# Patient Record
Sex: MALE | Race: WHITE | NOT HISPANIC OR LATINO | URBAN - METROPOLITAN AREA
[De-identification: names, ages, dates, MRNs, and addresses within clinical notes are randomized per-mention and may not be internally consistent; named-entity substitution may affect disease eponyms.]

---

## 2018-03-12 ENCOUNTER — INPATIENT (INPATIENT)
Facility: HOSPITAL | Age: 68
LOS: 7 days | Discharge: HOME | DRG: 86 | End: 2018-03-20
Attending: SURGERY | Admitting: SURGERY
Payer: MEDICARE

## 2018-03-12 VITALS — DIASTOLIC BLOOD PRESSURE: 90 MMHG | RESPIRATION RATE: 16 BRPM | SYSTOLIC BLOOD PRESSURE: 190 MMHG | HEART RATE: 90 BPM

## 2018-03-12 DIAGNOSIS — Z78.9 OTHER SPECIFIED HEALTH STATUS: Chronic | ICD-10-CM
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
## 2018-03-12 DIAGNOSIS — H92.21 OTORRHAGIA, RIGHT EAR: ICD-10-CM

## 2018-03-12 DIAGNOSIS — F10.10 ALCOHOL ABUSE, UNCOMPLICATED: ICD-10-CM

## 2018-03-12 LAB
APTT BLD: 30.2 SEC — SIGNIFICANT CHANGE UP (ref 27.5–37.4)
BASE EXCESS BLDV CALC-SCNC: 3.9 MMOL/L — HIGH (ref -2–2)
BASOPHILS # BLD AUTO: 0 K/UL — SIGNIFICANT CHANGE UP (ref 0–0.2)
BASOPHILS NFR BLD AUTO: 0.2 % — SIGNIFICANT CHANGE UP (ref 0–2)
CA-I SERPL-SCNC: 1.11 MMOL/L — LOW (ref 1.15–1.33)
CHLORIDE BLDV-SCNC: 102 MMOL/L — SIGNIFICANT CHANGE UP (ref 98–107)
EOSINOPHIL # BLD AUTO: 0.4 K/UL — SIGNIFICANT CHANGE UP (ref 0–0.5)
EOSINOPHIL NFR BLD AUTO: 5.2 % — SIGNIFICANT CHANGE UP (ref 0–6)
GAS PNL BLDV: 145 MMOL/L — SIGNIFICANT CHANGE UP (ref 135–145)
GAS PNL BLDV: SIGNIFICANT CHANGE UP
GAS PNL BLDV: SIGNIFICANT CHANGE UP
GLUCOSE BLDV-MCNC: 110 MG/DL — HIGH (ref 70–99)
HCO3 BLDV-SCNC: 28 MMOL/L — HIGH (ref 20–26)
HCT VFR BLD CALC: 42.6 % — SIGNIFICANT CHANGE UP (ref 42–52)
HCT VFR BLDA CALC: 46 — SIGNIFICANT CHANGE UP (ref 39–50)
HGB BLD CALC-MCNC: 14.9 G/DL — SIGNIFICANT CHANGE UP (ref 13–17)
HGB BLD-MCNC: 14.2 G/DL — SIGNIFICANT CHANGE UP (ref 14–18)
INR BLD: 0.93 RATIO — SIGNIFICANT CHANGE UP (ref 0.88–1.16)
LACTATE BLDV-MCNC: 2.9 MMOL/L — HIGH (ref 0.5–2)
LACTATE BLDV-MCNC: 3.1 MMOL/L — HIGH (ref 0.5–2)
LYMPHOCYTES # BLD AUTO: 3 K/UL — SIGNIFICANT CHANGE UP (ref 1–4.8)
LYMPHOCYTES # BLD AUTO: 36.7 % — SIGNIFICANT CHANGE UP (ref 20–55)
MCHC RBC-ENTMCNC: 29.8 PG — SIGNIFICANT CHANGE UP (ref 27–31)
MCHC RBC-ENTMCNC: 33.3 G/DL — SIGNIFICANT CHANGE UP (ref 32–36)
MCV RBC AUTO: 89.5 FL — SIGNIFICANT CHANGE UP (ref 80–94)
MONOCYTES # BLD AUTO: 1 K/UL — HIGH (ref 0–0.8)
MONOCYTES NFR BLD AUTO: 11.7 % — HIGH (ref 3–10)
NEUTROPHILS # BLD AUTO: 3.8 K/UL — SIGNIFICANT CHANGE UP (ref 1.8–8)
NEUTROPHILS NFR BLD AUTO: 45.8 % — SIGNIFICANT CHANGE UP (ref 37–73)
OTHER CELLS CSF MANUAL: 20 ML/DL — SIGNIFICANT CHANGE UP (ref 18–22)
PCO2 BLDV: 38 MMHG — SIGNIFICANT CHANGE UP (ref 35–50)
PH BLDV: 7.47 — HIGH (ref 7.32–7.43)
PLATELET # BLD AUTO: 201 K/UL — SIGNIFICANT CHANGE UP (ref 150–400)
PO2 BLDV: 88 MMHG — HIGH (ref 25–45)
POTASSIUM BLDV-SCNC: 3.3 MMOL/L — LOW (ref 3.4–4.5)
PROTHROM AB SERPL-ACNC: 10.2 SEC — SIGNIFICANT CHANGE UP (ref 9.8–12.7)
RBC # BLD: 4.76 M/UL — SIGNIFICANT CHANGE UP (ref 4.6–6.2)
RBC # FLD: 13.3 % — SIGNIFICANT CHANGE UP (ref 11–15.6)
SAO2 % BLDV: 97 % — SIGNIFICANT CHANGE UP
WBC # BLD: 8.2 K/UL — SIGNIFICANT CHANGE UP (ref 4.8–10.8)
WBC # FLD AUTO: 8.2 K/UL — SIGNIFICANT CHANGE UP (ref 4.8–10.8)

## 2018-03-12 PROCEDURE — 70450 CT HEAD/BRAIN W/O DYE: CPT | Mod: 26

## 2018-03-12 PROCEDURE — 72125 CT NECK SPINE W/O DYE: CPT | Mod: 26

## 2018-03-12 PROCEDURE — 99291 CRITICAL CARE FIRST HOUR: CPT

## 2018-03-12 PROCEDURE — 71045 X-RAY EXAM CHEST 1 VIEW: CPT | Mod: 26

## 2018-03-12 RX ORDER — ACETAMINOPHEN 500 MG
650 TABLET ORAL EVERY 6 HOURS
Qty: 0 | Refills: 0 | Status: DISCONTINUED | OUTPATIENT
Start: 2018-03-12 | End: 2018-03-14

## 2018-03-12 RX ORDER — LEVETIRACETAM 250 MG/1
1000 TABLET, FILM COATED ORAL EVERY 12 HOURS
Qty: 0 | Refills: 0 | Status: DISCONTINUED | OUTPATIENT
Start: 2018-03-12 | End: 2018-03-18

## 2018-03-12 RX ORDER — SODIUM CHLORIDE 9 MG/ML
1000 INJECTION INTRAMUSCULAR; INTRAVENOUS; SUBCUTANEOUS
Qty: 0 | Refills: 0 | Status: DISCONTINUED | OUTPATIENT
Start: 2018-03-12 | End: 2018-03-12

## 2018-03-12 RX ORDER — SODIUM CHLORIDE 9 MG/ML
1000 INJECTION INTRAMUSCULAR; INTRAVENOUS; SUBCUTANEOUS
Qty: 0 | Refills: 0 | Status: DISCONTINUED | OUTPATIENT
Start: 2018-03-12 | End: 2018-03-13

## 2018-03-12 RX ORDER — TETANUS TOXOID, REDUCED DIPHTHERIA TOXOID AND ACELLULAR PERTUSSIS VACCINE, ADSORBED 5; 2.5; 8; 8; 2.5 [IU]/.5ML; [IU]/.5ML; UG/.5ML; UG/.5ML; UG/.5ML
0.5 SUSPENSION INTRAMUSCULAR ONCE
Qty: 0 | Refills: 0 | Status: COMPLETED | OUTPATIENT
Start: 2018-03-12 | End: 2018-03-12

## 2018-03-12 NOTE — H&P ADULT - ASSESSMENT
66yo male s/p fall down steps while intoxicated. Patient for CT of head, cspine, chest abdomen and pelvis. CXR without significant traumatic injury. Tetanus updated in the trauma bay. C Collar in place. Disposition pending CT findings. SW for SBIRT. Clean out laceration to ear and repair as needed. Monitor for ETOH withdrawal. Follow up trauma labs/imaging. Dr. Landaverde present for entire encounter. 64yo male s/p fall down steps while intoxicated. Patient for CT of head, cspine, chest abdomen and pelvis. CXR without significant traumatic injury. Tetanus updated in the trauma bay. C Collar in place. Disposition pending CT findings. SW for SBIRT. Clean out laceration to ear and repair as needed. Monitor for ETOH withdrawal. Follow up trauma labs/imaging. Dr. Landaverde present for entire encounter.     Addendum: CT Head with Acute Left sided SDH/SAH. Dispo to SICU for further management. Neurosurgery consulted from CT scanner.

## 2018-03-12 NOTE — H&P ADULT - NSHPPHYSICALEXAM_GEN_ALL_CORE
Pertinent only for bleeding within left ear canal. Patient intoxicated and therefore unreliable physical exam as well as ROS.

## 2018-03-12 NOTE — H&P ADULT - HISTORY OF PRESENT ILLNESS
Approximately 66yo male s/p fall down 5 steps with LOC per spouse of "a few minutes". Patient consumed many alcoholic drinks this evening. Upon arrival to trauma bay a cervical collar was placed. GCS 14. Primary survey otherwise unremarkable. Patient speaking but confused. Only injury noted on secondary survey was blood to the right ear. Base Deficit +4. Lactate pending. No immediate life threats noted so patient taken to CT Scan for further evaluation. CXR performed in the bay prior to transport.

## 2018-03-12 NOTE — ED PROVIDER NOTE - OBJECTIVE STATEMENT
64 y/o M pt with no pertinent pmhx BIBA to the ED c/o change in MS and bleeding from R ear s/p fall down 6 steps today. CODE TRAUMA B ACTIVATED. EMS activated by pt's significant other. She explains that he had multiple drinks of scotch tonight. Pt's wife then heard a thump. She then found her  at the bottom of the stairs and was unable to arouse the pt. Takes 1 aspirin daily but no blood thinners. Unable to obtain full Hx and Ros due to pt's condition. NKDA. Denies vomiting, chest pain, bowel/bladder incontinence, fever, chills, sob or any other complaints.

## 2018-03-12 NOTE — H&P ADULT - ATTENDING COMMENTS
Patient seen and examined on arrival  ABCDE intact, GCS 13-14 non focal, BACON  CXR no pTX, CT left SDH/SAH, no midline shift  on my reviewed no intra abdominal or intrathoracic gross injury.\  Plan for ICU admission, neuro checks, repeat CT scan  neurosurgery top evaluate  Hydrate for lactate

## 2018-03-12 NOTE — ED ADULT TRIAGE NOTE - CHIEF COMPLAINT QUOTE
EMS requesting trauma, "pt fell down stairs, approximately 5 ft, pos LOC, bleeding from right ear, admits to alcohol"

## 2018-03-12 NOTE — ED PROVIDER NOTE - VASCULAR COMPROMISE
central and peripheral pulses 2+ bilaterally./pulses full and equal bilaterally/no vascular compromise

## 2018-03-12 NOTE — ED PROVIDER NOTE - CARE PLAN
Principal Discharge DX:	SAH (subarachnoid hemorrhage)  Secondary Diagnosis:	ETOH abuse  Secondary Diagnosis:	SDH (subdural hematoma)

## 2018-03-13 DIAGNOSIS — S06.5X0A TRAUMATIC SUBDURAL HEMORRHAGE WITHOUT LOSS OF CONSCIOUSNESS, INITIAL ENCOUNTER: ICD-10-CM

## 2018-03-13 DIAGNOSIS — I62.00 NONTRAUMATIC SUBDURAL HEMORRHAGE, UNSPECIFIED: ICD-10-CM

## 2018-03-13 DIAGNOSIS — I60.9 NONTRAUMATIC SUBARACHNOID HEMORRHAGE, UNSPECIFIED: ICD-10-CM

## 2018-03-13 LAB
ABO RH CONFIRMATION: SIGNIFICANT CHANGE UP
ALBUMIN SERPL ELPH-MCNC: 3.9 G/DL — SIGNIFICANT CHANGE UP (ref 3.3–5.2)
ALBUMIN SERPL ELPH-MCNC: 4.4 G/DL — SIGNIFICANT CHANGE UP (ref 3.3–5.2)
ALP SERPL-CCNC: 57 U/L — SIGNIFICANT CHANGE UP (ref 40–120)
ALP SERPL-CCNC: 64 U/L — SIGNIFICANT CHANGE UP (ref 40–120)
ALT FLD-CCNC: 26 U/L — SIGNIFICANT CHANGE UP
ALT FLD-CCNC: 27 U/L — SIGNIFICANT CHANGE UP
ANION GAP SERPL CALC-SCNC: 10 MMOL/L — SIGNIFICANT CHANGE UP (ref 5–17)
ANION GAP SERPL CALC-SCNC: 16 MMOL/L — SIGNIFICANT CHANGE UP (ref 5–17)
ANION GAP SERPL CALC-SCNC: 18 MMOL/L — HIGH (ref 5–17)
ANION GAP SERPL CALC-SCNC: 20 MMOL/L — HIGH (ref 5–17)
APTT BLD: 31.6 SEC — SIGNIFICANT CHANGE UP (ref 27.5–37.4)
AST SERPL-CCNC: 38 U/L — SIGNIFICANT CHANGE UP
AST SERPL-CCNC: 41 U/L — HIGH
BASOPHILS # BLD AUTO: 0 K/UL — SIGNIFICANT CHANGE UP (ref 0–0.2)
BASOPHILS # BLD AUTO: 0 K/UL — SIGNIFICANT CHANGE UP (ref 0–0.2)
BASOPHILS NFR BLD AUTO: 0.1 % — SIGNIFICANT CHANGE UP (ref 0–2)
BASOPHILS NFR BLD AUTO: 0.3 % — SIGNIFICANT CHANGE UP (ref 0–2)
BILIRUB SERPL-MCNC: 0.3 MG/DL — LOW (ref 0.4–2)
BILIRUB SERPL-MCNC: 0.3 MG/DL — LOW (ref 0.4–2)
BLD GP AB SCN SERPL QL: SIGNIFICANT CHANGE UP
BUN SERPL-MCNC: 15 MG/DL — SIGNIFICANT CHANGE UP (ref 8–20)
BUN SERPL-MCNC: 18 MG/DL — SIGNIFICANT CHANGE UP (ref 8–20)
BUN SERPL-MCNC: 22 MG/DL — HIGH (ref 8–20)
BUN SERPL-MCNC: 23 MG/DL — HIGH (ref 8–20)
CALCIUM SERPL-MCNC: 8.8 MG/DL — SIGNIFICANT CHANGE UP (ref 8.6–10.2)
CALCIUM SERPL-MCNC: 8.8 MG/DL — SIGNIFICANT CHANGE UP (ref 8.6–10.2)
CALCIUM SERPL-MCNC: 9.3 MG/DL — SIGNIFICANT CHANGE UP (ref 8.6–10.2)
CALCIUM SERPL-MCNC: 9.7 MG/DL — SIGNIFICANT CHANGE UP (ref 8.6–10.2)
CHLORIDE SERPL-SCNC: 100 MMOL/L — SIGNIFICANT CHANGE UP (ref 98–107)
CHLORIDE SERPL-SCNC: 96 MMOL/L — LOW (ref 98–107)
CHLORIDE SERPL-SCNC: 97 MMOL/L — LOW (ref 98–107)
CHLORIDE SERPL-SCNC: 99 MMOL/L — SIGNIFICANT CHANGE UP (ref 98–107)
CO2 SERPL-SCNC: 22 MMOL/L — SIGNIFICANT CHANGE UP (ref 22–29)
CO2 SERPL-SCNC: 25 MMOL/L — SIGNIFICANT CHANGE UP (ref 22–29)
CO2 SERPL-SCNC: 26 MMOL/L — SIGNIFICANT CHANGE UP (ref 22–29)
CO2 SERPL-SCNC: 28 MMOL/L — SIGNIFICANT CHANGE UP (ref 22–29)
CREAT SERPL-MCNC: 0.54 MG/DL — SIGNIFICANT CHANGE UP (ref 0.5–1.3)
CREAT SERPL-MCNC: 0.58 MG/DL — SIGNIFICANT CHANGE UP (ref 0.5–1.3)
CREAT SERPL-MCNC: 0.76 MG/DL — SIGNIFICANT CHANGE UP (ref 0.5–1.3)
CREAT SERPL-MCNC: 0.76 MG/DL — SIGNIFICANT CHANGE UP (ref 0.5–1.3)
EOSINOPHIL # BLD AUTO: 0 K/UL — SIGNIFICANT CHANGE UP (ref 0–0.5)
EOSINOPHIL # BLD AUTO: 0.4 K/UL — SIGNIFICANT CHANGE UP (ref 0–0.5)
EOSINOPHIL NFR BLD AUTO: 0.1 % — SIGNIFICANT CHANGE UP (ref 0–5)
EOSINOPHIL NFR BLD AUTO: 4.8 % — SIGNIFICANT CHANGE UP (ref 0–6)
ETHANOL SERPL-MCNC: 217 MG/DL — SIGNIFICANT CHANGE UP
ETHANOL SERPL-MCNC: 225 MG/DL — SIGNIFICANT CHANGE UP
GLUCOSE SERPL-MCNC: 104 MG/DL — SIGNIFICANT CHANGE UP (ref 70–115)
GLUCOSE SERPL-MCNC: 115 MG/DL — SIGNIFICANT CHANGE UP (ref 70–115)
GLUCOSE SERPL-MCNC: 139 MG/DL — HIGH (ref 70–115)
GLUCOSE SERPL-MCNC: 151 MG/DL — HIGH (ref 70–115)
HCT VFR BLD CALC: 38.8 % — LOW (ref 42–52)
HCT VFR BLD CALC: 41.3 % — LOW (ref 42–52)
HGB BLD-MCNC: 13 G/DL — LOW (ref 14–18)
HGB BLD-MCNC: 13.7 G/DL — LOW (ref 14–18)
INR BLD: 0.92 RATIO — SIGNIFICANT CHANGE UP (ref 0.88–1.16)
LACTATE SERPL-SCNC: 2.8 MMOL/L — HIGH (ref 0.5–2)
LYMPHOCYTES # BLD AUTO: 0.8 K/UL — LOW (ref 1–4.8)
LYMPHOCYTES # BLD AUTO: 3.3 K/UL — SIGNIFICANT CHANGE UP (ref 1–4.8)
LYMPHOCYTES # BLD AUTO: 34.9 % — SIGNIFICANT CHANGE UP (ref 20–55)
LYMPHOCYTES # BLD AUTO: 8 % — LOW (ref 20–55)
MAGNESIUM SERPL-MCNC: 1.9 MG/DL — SIGNIFICANT CHANGE UP (ref 1.6–2.6)
MCHC RBC-ENTMCNC: 29.8 PG — SIGNIFICANT CHANGE UP (ref 27–31)
MCHC RBC-ENTMCNC: 30 PG — SIGNIFICANT CHANGE UP (ref 27–31)
MCHC RBC-ENTMCNC: 33.2 G/DL — SIGNIFICANT CHANGE UP (ref 32–36)
MCHC RBC-ENTMCNC: 33.5 G/DL — SIGNIFICANT CHANGE UP (ref 32–36)
MCV RBC AUTO: 89 FL — SIGNIFICANT CHANGE UP (ref 80–94)
MCV RBC AUTO: 90.4 FL — SIGNIFICANT CHANGE UP (ref 80–94)
MONOCYTES # BLD AUTO: 0.5 K/UL — SIGNIFICANT CHANGE UP (ref 0–0.8)
MONOCYTES # BLD AUTO: 1.2 K/UL — HIGH (ref 0–0.8)
MONOCYTES NFR BLD AUTO: 12.8 % — HIGH (ref 3–10)
MONOCYTES NFR BLD AUTO: 4.9 % — SIGNIFICANT CHANGE UP (ref 3–10)
NEUTROPHILS # BLD AUTO: 4.4 K/UL — SIGNIFICANT CHANGE UP (ref 1.8–8)
NEUTROPHILS # BLD AUTO: 8.3 K/UL — HIGH (ref 1.8–8)
NEUTROPHILS NFR BLD AUTO: 47 % — SIGNIFICANT CHANGE UP (ref 37–73)
NEUTROPHILS NFR BLD AUTO: 86.6 % — HIGH (ref 37–73)
OSMOLALITY SERPL: 296 MOSM/KG — SIGNIFICANT CHANGE UP (ref 280–300)
OSMOLALITY SERPL: 318 MOSM/KG — HIGH (ref 280–300)
PHOSPHATE SERPL-MCNC: 3 MG/DL — SIGNIFICANT CHANGE UP (ref 2.4–4.7)
PLATELET # BLD AUTO: 162 K/UL — SIGNIFICANT CHANGE UP (ref 150–400)
PLATELET # BLD AUTO: 181 K/UL — SIGNIFICANT CHANGE UP (ref 150–400)
POTASSIUM SERPL-MCNC: 3.3 MMOL/L — LOW (ref 3.5–5.3)
POTASSIUM SERPL-MCNC: 3.4 MMOL/L — LOW (ref 3.5–5.3)
POTASSIUM SERPL-MCNC: 3.6 MMOL/L — SIGNIFICANT CHANGE UP (ref 3.5–5.3)
POTASSIUM SERPL-MCNC: 4 MMOL/L — SIGNIFICANT CHANGE UP (ref 3.5–5.3)
POTASSIUM SERPL-SCNC: 3.3 MMOL/L — LOW (ref 3.5–5.3)
POTASSIUM SERPL-SCNC: 3.4 MMOL/L — LOW (ref 3.5–5.3)
POTASSIUM SERPL-SCNC: 3.6 MMOL/L — SIGNIFICANT CHANGE UP (ref 3.5–5.3)
POTASSIUM SERPL-SCNC: 4 MMOL/L — SIGNIFICANT CHANGE UP (ref 3.5–5.3)
PROT SERPL-MCNC: 6.6 G/DL — SIGNIFICANT CHANGE UP (ref 6.6–8.7)
PROT SERPL-MCNC: 7.1 G/DL — SIGNIFICANT CHANGE UP (ref 6.6–8.7)
PROTHROM AB SERPL-ACNC: 10.1 SEC — SIGNIFICANT CHANGE UP (ref 9.8–12.7)
RBC # BLD: 4.36 M/UL — LOW (ref 4.6–6.2)
RBC # BLD: 4.57 M/UL — LOW (ref 4.6–6.2)
RBC # FLD: 13 % — SIGNIFICANT CHANGE UP (ref 11–15.6)
RBC # FLD: 13.3 % — SIGNIFICANT CHANGE UP (ref 11–15.6)
SODIUM SERPL-SCNC: 137 MMOL/L — SIGNIFICANT CHANGE UP (ref 135–145)
SODIUM SERPL-SCNC: 138 MMOL/L — SIGNIFICANT CHANGE UP (ref 135–145)
SODIUM SERPL-SCNC: 141 MMOL/L — SIGNIFICANT CHANGE UP (ref 135–145)
SODIUM SERPL-SCNC: 141 MMOL/L — SIGNIFICANT CHANGE UP (ref 135–145)
TYPE + AB SCN PNL BLD: SIGNIFICANT CHANGE UP
WBC # BLD: 9.4 K/UL — SIGNIFICANT CHANGE UP (ref 4.8–10.8)
WBC # BLD: 9.6 K/UL — SIGNIFICANT CHANGE UP (ref 4.8–10.8)
WBC # FLD AUTO: 9.4 K/UL — SIGNIFICANT CHANGE UP (ref 4.8–10.8)
WBC # FLD AUTO: 9.6 K/UL — SIGNIFICANT CHANGE UP (ref 4.8–10.8)

## 2018-03-13 PROCEDURE — 73030 X-RAY EXAM OF SHOULDER: CPT | Mod: 26,RT

## 2018-03-13 PROCEDURE — 70551 MRI BRAIN STEM W/O DYE: CPT | Mod: 26

## 2018-03-13 PROCEDURE — 99223 1ST HOSP IP/OBS HIGH 75: CPT

## 2018-03-13 PROCEDURE — 70544 MR ANGIOGRAPHY HEAD W/O DYE: CPT | Mod: 26,59

## 2018-03-13 PROCEDURE — 70486 CT MAXILLOFACIAL W/O DYE: CPT | Mod: 26

## 2018-03-13 PROCEDURE — 70450 CT HEAD/BRAIN W/O DYE: CPT | Mod: 26

## 2018-03-13 PROCEDURE — 93010 ELECTROCARDIOGRAM REPORT: CPT

## 2018-03-13 PROCEDURE — 74177 CT ABD & PELVIS W/CONTRAST: CPT | Mod: 26

## 2018-03-13 PROCEDURE — 70547 MR ANGIOGRAPHY NECK W/O DYE: CPT | Mod: 26

## 2018-03-13 PROCEDURE — 72141 MRI NECK SPINE W/O DYE: CPT | Mod: 26

## 2018-03-13 PROCEDURE — 71260 CT THORAX DX C+: CPT | Mod: 26

## 2018-03-13 PROCEDURE — 99291 CRITICAL CARE FIRST HOUR: CPT

## 2018-03-13 RX ORDER — FOLIC ACID 0.8 MG
1 TABLET ORAL DAILY
Qty: 0 | Refills: 0 | Status: DISCONTINUED | OUTPATIENT
Start: 2018-03-13 | End: 2018-03-17

## 2018-03-13 RX ORDER — ATORVASTATIN CALCIUM 80 MG/1
40 TABLET, FILM COATED ORAL AT BEDTIME
Qty: 0 | Refills: 0 | Status: DISCONTINUED | OUTPATIENT
Start: 2018-03-13 | End: 2018-03-20

## 2018-03-13 RX ORDER — SENNA PLUS 8.6 MG/1
2 TABLET ORAL AT BEDTIME
Qty: 0 | Refills: 0 | Status: DISCONTINUED | OUTPATIENT
Start: 2018-03-13 | End: 2018-03-20

## 2018-03-13 RX ORDER — GABAPENTIN 400 MG/1
300 CAPSULE ORAL THREE TIMES A DAY
Qty: 0 | Refills: 0 | Status: DISCONTINUED | OUTPATIENT
Start: 2018-03-13 | End: 2018-03-20

## 2018-03-13 RX ORDER — SODIUM CHLORIDE 5 G/100ML
500 INJECTION, SOLUTION INTRAVENOUS
Qty: 0 | Refills: 0 | Status: DISCONTINUED | OUTPATIENT
Start: 2018-03-13 | End: 2018-03-13

## 2018-03-13 RX ORDER — SODIUM CHLORIDE 9 MG/ML
1000 INJECTION INTRAMUSCULAR; INTRAVENOUS; SUBCUTANEOUS ONCE
Qty: 0 | Refills: 0 | Status: COMPLETED | OUTPATIENT
Start: 2018-03-13 | End: 2018-03-13

## 2018-03-13 RX ORDER — DOCUSATE SODIUM 100 MG
100 CAPSULE ORAL THREE TIMES A DAY
Qty: 0 | Refills: 0 | Status: DISCONTINUED | OUTPATIENT
Start: 2018-03-13 | End: 2018-03-20

## 2018-03-13 RX ORDER — DEXMEDETOMIDINE HYDROCHLORIDE IN 0.9% SODIUM CHLORIDE 4 UG/ML
0.2 INJECTION INTRAVENOUS
Qty: 200 | Refills: 0 | Status: DISCONTINUED | OUTPATIENT
Start: 2018-03-13 | End: 2018-03-14

## 2018-03-13 RX ORDER — SODIUM CHLORIDE 5 G/100ML
500 INJECTION, SOLUTION INTRAVENOUS
Qty: 0 | Refills: 0 | Status: DISCONTINUED | OUTPATIENT
Start: 2018-03-13 | End: 2018-03-14

## 2018-03-13 RX ORDER — HYDROCHLOROTHIAZIDE 25 MG
25 TABLET ORAL DAILY
Qty: 0 | Refills: 0 | Status: DISCONTINUED | OUTPATIENT
Start: 2018-03-13 | End: 2018-03-20

## 2018-03-13 RX ORDER — AMLODIPINE BESYLATE 2.5 MG/1
10 TABLET ORAL DAILY
Qty: 0 | Refills: 0 | Status: DISCONTINUED | OUTPATIENT
Start: 2018-03-13 | End: 2018-03-20

## 2018-03-13 RX ORDER — POTASSIUM CHLORIDE 20 MEQ
10 PACKET (EA) ORAL
Qty: 0 | Refills: 0 | Status: COMPLETED | OUTPATIENT
Start: 2018-03-13 | End: 2018-03-13

## 2018-03-13 RX ORDER — LEVOTHYROXINE SODIUM 125 MCG
200 TABLET ORAL DAILY
Qty: 0 | Refills: 0 | Status: DISCONTINUED | OUTPATIENT
Start: 2018-03-13 | End: 2018-03-20

## 2018-03-13 RX ORDER — THIAMINE MONONITRATE (VIT B1) 100 MG
100 TABLET ORAL DAILY
Qty: 0 | Refills: 0 | Status: DISCONTINUED | OUTPATIENT
Start: 2018-03-13 | End: 2018-03-17

## 2018-03-13 RX ADMIN — Medication 2 MILLIGRAM(S): at 16:30

## 2018-03-13 RX ADMIN — GABAPENTIN 300 MILLIGRAM(S): 400 CAPSULE ORAL at 14:48

## 2018-03-13 RX ADMIN — Medication 1 TABLET(S): at 14:48

## 2018-03-13 RX ADMIN — Medication 100 MILLIEQUIVALENT(S): at 03:18

## 2018-03-13 RX ADMIN — Medication 100 MILLIEQUIVALENT(S): at 01:57

## 2018-03-13 RX ADMIN — Medication 100 MILLIEQUIVALENT(S): at 04:30

## 2018-03-13 RX ADMIN — SODIUM CHLORIDE 40 MILLILITER(S): 5 INJECTION, SOLUTION INTRAVENOUS at 09:41

## 2018-03-13 RX ADMIN — LEVETIRACETAM 400 MILLIGRAM(S): 250 TABLET, FILM COATED ORAL at 06:34

## 2018-03-13 RX ADMIN — Medication 100 MILLIGRAM(S): at 14:48

## 2018-03-13 RX ADMIN — Medication 25 MILLIGRAM(S): at 06:31

## 2018-03-13 RX ADMIN — LEVETIRACETAM 400 MILLIGRAM(S): 250 TABLET, FILM COATED ORAL at 18:45

## 2018-03-13 RX ADMIN — Medication 100 MILLIGRAM(S): at 06:33

## 2018-03-13 RX ADMIN — DEXMEDETOMIDINE HYDROCHLORIDE IN 0.9% SODIUM CHLORIDE 5.33 MICROGRAM(S)/KG/HR: 4 INJECTION INTRAVENOUS at 06:31

## 2018-03-13 RX ADMIN — Medication 1 MILLIGRAM(S): at 14:48

## 2018-03-13 RX ADMIN — Medication 200 MICROGRAM(S): at 06:32

## 2018-03-13 RX ADMIN — DEXMEDETOMIDINE HYDROCHLORIDE IN 0.9% SODIUM CHLORIDE 5.33 MICROGRAM(S)/KG/HR: 4 INJECTION INTRAVENOUS at 03:18

## 2018-03-13 RX ADMIN — Medication 2 MILLIGRAM(S): at 17:30

## 2018-03-13 RX ADMIN — DEXMEDETOMIDINE HYDROCHLORIDE IN 0.9% SODIUM CHLORIDE 5.33 MICROGRAM(S)/KG/HR: 4 INJECTION INTRAVENOUS at 01:25

## 2018-03-13 RX ADMIN — GABAPENTIN 300 MILLIGRAM(S): 400 CAPSULE ORAL at 06:34

## 2018-03-13 RX ADMIN — AMLODIPINE BESYLATE 10 MILLIGRAM(S): 2.5 TABLET ORAL at 06:33

## 2018-03-13 RX ADMIN — SODIUM CHLORIDE 20 MILLILITER(S): 5 INJECTION, SOLUTION INTRAVENOUS at 08:00

## 2018-03-13 RX ADMIN — SODIUM CHLORIDE 100 MILLILITER(S): 9 INJECTION INTRAMUSCULAR; INTRAVENOUS; SUBCUTANEOUS at 01:00

## 2018-03-13 RX ADMIN — SODIUM CHLORIDE 2000 MILLILITER(S): 9 INJECTION INTRAMUSCULAR; INTRAVENOUS; SUBCUTANEOUS at 01:00

## 2018-03-13 NOTE — SWALLOW BEDSIDE ASSESSMENT ADULT - SLP GENERAL OBSERVATIONS
Pt received & seen seated upright in bed, +eyes closed however arousable with cues, +cervical collar,  +reduced cognition with ? word finding deficits, +reduced command following, +O2 nasal canula (sats: 95%), +sister in law at bedside

## 2018-03-13 NOTE — PROGRESS NOTE ADULT - ASSESSMENT
67ym s/p fall right frontal contusion right temporal sah  Plan  1. repeat ct of the brain appreciated.  2. Pt will need a mri of the brain right sided weakness.  3. pt will need a mri of the cervical region  4. Right sided upper extrem weakness with extensive hematoma ? brachial plexus injury if cervical mri benign.   5. Pt right lower extrem weakness-hip?  6. Na 141 recommendation to start saline due to the increase vasogenic edema.  7. the above discussed with Dr Parsons earlier this am by the overnight staff and discussed with the trauma PA staff

## 2018-03-13 NOTE — PROGRESS NOTE ADULT - SUBJECTIVE AND OBJECTIVE BOX
INTERVAL HPI/OVERNIGHT EVENTS/SUBJECTIVE:  Admitted for SAH/sdh, clavicle fracture, 1st rib fracture and facial fractures after fall down stairs while intoxicated.  Right sided deficit - not moving right arm and right leg.      ICU Vital Signs Last 24 Hrs  T(C): 36.8 (13 Mar 2018 08:00), Max: 36.8 (13 Mar 2018 08:00)  T(F): 98.2 (13 Mar 2018 08:00), Max: 98.2 (13 Mar 2018 08:00)  HR: 81 (13 Mar 2018 08:00) (65 - 102)  BP: 112/59 (13 Mar 2018 08:00) (106/59 - 190/90)  BP(mean): 88 (13 Mar 2018 07:00) (76 - 120)  ABP: --  ABP(mean): --  RR: 20 (13 Mar 2018 08:00) (14 - 25)  SpO2: 95% (13 Mar 2018 08:00) (92% - 99%)      I&O's Detail    12 Mar 2018 07:01  -  13 Mar 2018 07:00  --------------------------------------------------------  IN:    dexmedetomidine Infusion: 51.3 mL    IV PiggyBack: 400 mL    Sodium Chloride 0.9% IV Bolus: 1000 mL    sodium chloride 0.9%.: 600 mL  Total IN: 2051.3 mL    OUT:    Voided: 300 mL  Total OUT: 300 mL    Total NET: 1751.3 mL                MEDICATIONS  (STANDING):  amLODIPine   Tablet 10 milliGRAM(s) Oral daily  atorvastatin 40 milliGRAM(s) Oral at bedtime  dexmedetomidine Infusion 0.2 MICROgram(s)/kG/Hr (5.335 mL/Hr) IV Continuous <Continuous>  docusate sodium 100 milliGRAM(s) Oral three times a day  folic acid Injectable 1 milliGRAM(s) IV Push daily  gabapentin 300 milliGRAM(s) Oral three times a day  hydrochlorothiazide 25 milliGRAM(s) Oral daily  levETIRAcetam  IVPB 1000 milliGRAM(s) IV Intermittent every 12 hours  levothyroxine 200 MICROGram(s) Oral daily  multivitamin 1 Tablet(s) Oral daily  senna 2 Tablet(s) Oral at bedtime  sodium chloride 0.9%. 1000 milliLiter(s) (100 mL/Hr) IV Continuous <Continuous>  sodium chloride 3%. 500 milliLiter(s) (20 mL/Hr) IV Continuous <Continuous>  thiamine Injectable 100 milliGRAM(s) IV Push daily    MEDICATIONS  (PRN):  acetaminophen   Tablet. 650 milliGRAM(s) Oral every 6 hours PRN Mild Pain (1 - 3)      NUTRITION/IVF:     CENTRAL LINE:  LOCATION:   DATE INSERTED:  CVP:  SCVO2:    NICKERSON:   DATE INSERTED:    A-LINE:    LOCATION:   DATE INSERTED:   SVV:  CO/CI:     CHEST TUBE:  LOCATION:  DATE INSERTED: OUTPUT/24 HRS:  SUCTION/WATER SEAL:     NG/OG TUBE:  DATE INSERTED:  OUTPUT/24 HRS:    MISC:     PHYSICAL EXAM:    Gen:    Eyes:    Neurological:    ENMT:    Neck:    Pulmonary:    Cardiovascular:    Gastrointestinal:    Genitourinary:    Back:    Extremities:    Skin:    Musculoskeletal:          LABS:  CBC Full  -  ( 13 Mar 2018 07:05 )  WBC Count : 9.6 K/uL  Hemoglobin : 13.0 g/dL  Hematocrit : 38.8 %  Platelet Count - Automated : 162 K/uL  Mean Cell Volume : 89.0 fl  Mean Cell Hemoglobin : 29.8 pg  Mean Cell Hemoglobin Concentration : 33.5 g/dL  Auto Neutrophil # : 8.3 K/uL  Auto Lymphocyte # : 0.8 K/uL  Auto Monocyte # : 0.5 K/uL  Auto Eosinophil # : 0.0 K/uL  Auto Basophil # : 0.0 K/uL  Auto Neutrophil % : 86.6 %  Auto Lymphocyte % : 8.0 %  Auto Monocyte % : 4.9 %  Auto Eosinophil % : 0.1 %  Auto Basophil % : 0.1 %    03-13    141  |  100  |  18.0  ----------------------------<  139<H>  4.0   |  25.0  |  0.58    Ca    8.8      13 Mar 2018 07:05  Phos  3.0     03-13  Mg     1.9     03-13    TPro  6.6  /  Alb  3.9  /  TBili  0.3<L>  /  DBili  x   /  AST  41<H>  /  ALT  26  /  AlkPhos  57  03-13    PT/INR - ( 13 Mar 2018 00:30 )   PT: 10.1 sec;   INR: 0.92 ratio         PTT - ( 13 Mar 2018 00:30 )  PTT:31.6 sec    RECENT CULTURES:      LIVER FUNCTIONS - ( 13 Mar 2018 00:30 )  Alb: 3.9 g/dL / Pro: 6.6 g/dL / ALK PHOS: 57 U/L / ALT: 26 U/L / AST: 41 U/L / GGT: x               CAPILLARY BLOOD GLUCOSE      RADIOLOGY & ADDITIONAL STUDIES:    ASSESSMENT/PLAN:  65yMale presenting with:    Neuro:    CV:    Pulm:    GI/Nutrition:    /Renal:    ID:    Lines/Tubes:    Endo:    Skin:    Proph:    Dispo:      CRITICAL CARE TIME SPENT: INTERVAL HPI/OVERNIGHT EVENTS/SUBJECTIVE:  Admitted for SAH/sdh, clavicle fracture, 1st rib fracture and facial fractures after fall down stairs while intoxicated.  Right sided deficit - not moving right arm and right leg.      ICU Vital Signs Last 24 Hrs  T(C): 36.8 (13 Mar 2018 08:00), Max: 36.8 (13 Mar 2018 08:00)  T(F): 98.2 (13 Mar 2018 08:00), Max: 98.2 (13 Mar 2018 08:00)  HR: 81 (13 Mar 2018 08:00) (65 - 102)  BP: 112/59 (13 Mar 2018 08:00) (106/59 - 190/90)  BP(mean): 88 (13 Mar 2018 07:00) (76 - 120)  ABP: --  ABP(mean): --  RR: 20 (13 Mar 2018 08:00) (14 - 25)  SpO2: 95% (13 Mar 2018 08:00) (92% - 99%)      I&O's Detail    12 Mar 2018 07:01  -  13 Mar 2018 07:00  --------------------------------------------------------  IN:    dexmedetomidine Infusion: 51.3 mL    IV PiggyBack: 400 mL    Sodium Chloride 0.9% IV Bolus: 1000 mL    sodium chloride 0.9%.: 600 mL  Total IN: 2051.3 mL    OUT:    Voided: 300 mL  Total OUT: 300 mL    Total NET: 1751.3 mL                MEDICATIONS  (STANDING):  amLODIPine   Tablet 10 milliGRAM(s) Oral daily  atorvastatin 40 milliGRAM(s) Oral at bedtime  dexmedetomidine Infusion 0.2 MICROgram(s)/kG/Hr (5.335 mL/Hr) IV Continuous <Continuous>  docusate sodium 100 milliGRAM(s) Oral three times a day  folic acid Injectable 1 milliGRAM(s) IV Push daily  gabapentin 300 milliGRAM(s) Oral three times a day  hydrochlorothiazide 25 milliGRAM(s) Oral daily  levETIRAcetam  IVPB 1000 milliGRAM(s) IV Intermittent every 12 hours  levothyroxine 200 MICROGram(s) Oral daily  multivitamin 1 Tablet(s) Oral daily  senna 2 Tablet(s) Oral at bedtime  sodium chloride 0.9%. 1000 milliLiter(s) (100 mL/Hr) IV Continuous <Continuous>  sodium chloride 3%. 500 milliLiter(s) (20 mL/Hr) IV Continuous <Continuous>  thiamine Injectable 100 milliGRAM(s) IV Push daily    MEDICATIONS  (PRN):  acetaminophen   Tablet. 650 milliGRAM(s) Oral every 6 hours PRN Mild Pain (1 - 3)      NUTRITION/IVF:     CENTRAL LINE:  LOCATION:   DATE INSERTED:  CVP:  SCVO2:    NICKERSON:   DATE INSERTED:    A-LINE:    LOCATION:   DATE INSERTED:   SVV:  CO/CI:     CHEST TUBE:  LOCATION:  DATE INSERTED: OUTPUT/24 HRS:  SUCTION/WATER SEAL:     NG/OG TUBE:  DATE INSERTED:  OUTPUT/24 HRS:    MISC:     PHYSICAL EXAM:    Gen:  NAD    Eyes:  PERRLA    Neurological:  Confused.  GCS 14    ENMT:    Neck:  No JVD    Pulmonary:  CTA B    Cardiovascular:  S1S2 regular    Gastrointestinal:  S/NT/ND    Genitourinary:  Voids    Back:    Extremities:  No edema    Skin:  Warm    Musculoskeletal:  Moves left leg and left arm.  does not move right leg or arm.            LABS:  CBC Full  -  ( 13 Mar 2018 07:05 )  WBC Count : 9.6 K/uL  Hemoglobin : 13.0 g/dL  Hematocrit : 38.8 %  Platelet Count - Automated : 162 K/uL  Mean Cell Volume : 89.0 fl  Mean Cell Hemoglobin : 29.8 pg  Mean Cell Hemoglobin Concentration : 33.5 g/dL  Auto Neutrophil # : 8.3 K/uL  Auto Lymphocyte # : 0.8 K/uL  Auto Monocyte # : 0.5 K/uL  Auto Eosinophil # : 0.0 K/uL  Auto Basophil # : 0.0 K/uL  Auto Neutrophil % : 86.6 %  Auto Lymphocyte % : 8.0 %  Auto Monocyte % : 4.9 %  Auto Eosinophil % : 0.1 %  Auto Basophil % : 0.1 %    03-13    141  |  100  |  18.0  ----------------------------<  139<H>  4.0   |  25.0  |  0.58    Ca    8.8      13 Mar 2018 07:05  Phos  3.0     03-13  Mg     1.9     03-13    TPro  6.6  /  Alb  3.9  /  TBili  0.3<L>  /  DBili  x   /  AST  41<H>  /  ALT  26  /  AlkPhos  57  03-13    PT/INR - ( 13 Mar 2018 00:30 )   PT: 10.1 sec;   INR: 0.92 ratio         PTT - ( 13 Mar 2018 00:30 )  PTT:31.6 sec    RECENT CULTURES:      LIVER FUNCTIONS - ( 13 Mar 2018 00:30 )  Alb: 3.9 g/dL / Pro: 6.6 g/dL / ALK PHOS: 57 U/L / ALT: 26 U/L / AST: 41 U/L / GGT: x               CAPILLARY BLOOD GLUCOSE    RADIOLOGY & ADDITIONAL STUDIES:    ASSESSMENT/PLAN:  65yMale presenting with:    Neuro:  Keppra.  Will use 1.5% hypertonic saline and check Na levels.   Will do MRI/MRA head and neck as has unaccountable right sided deficit - ?stroke vs intoxicated and vascular injury.  F/u NS    CV:  Resume home anti-hypertensives    Pulm:  supplemental O2 prn    GI/Nutrition:  NPO until w/u complete.  Then speech/swallow eval    /Renal:  Voids    ID:  No abx    Lines/Tubes:  PIV    Endo:  Synthroid    Musculoskeletal:  Pain control for 1st rib fracture.  sling for clavicle fracture    Skin: No issues    Proph:  SCDs    Dispo:  SICU for TBI, CIWA, fractures    CRITICAL CARE TIME SPENT:  35 minutes

## 2018-03-13 NOTE — SWALLOW BEDSIDE ASSESSMENT ADULT - SWALLOW EVAL: RECOMMENDED FEEDING/EATING TECHNIQUES
allow for swallow between intakes/small sips/bites/position upright (90 degrees)/crush medication (when feasible)/maintain upright posture during/after eating for 30 mins

## 2018-03-13 NOTE — SWALLOW BEDSIDE ASSESSMENT ADULT - COMMENTS
67 year old male, admitted for SAH/sdh, clavicle fracture, 1st rib fracture and facial fractures after fall down stairs while intoxicated. Pt declined additional trials, however, pt not appropriate for additional trials this date due to reduced cognition

## 2018-03-13 NOTE — SWALLOW BEDSIDE ASSESSMENT ADULT - SWALLOW EVAL: DIAGNOSIS
Overall functional oral & pharyngeal stage of swallow for puree, mech soft & thin fluids with NO overt s/s of penetration/aspiration

## 2018-03-13 NOTE — CONSULT NOTE ADULT - SUBJECTIVE AND OBJECTIVE BOX
CC: Patient is a 65y old  Male who presents  S/P  Fall while intoxicated with LOC (12 Mar 2018 23:36)      HPI: Approximately 66yo male s/p fall down 5 steps with LOC per spouse of "a few minutes". Patient consumed many alcoholic drinks this evening. Upon arrival to trauma bay a cervical collar was placed. GCS 14. Primary survey otherwise unremarkable. Patient speaking but confused. Only injury noted on secondary survey was blood to the right ear.     pt c/o + -headache  /10 on the pain scale   + - Nausea /+ - Vomiting  admits denies weakness  admits denies numbness/ tingling  admist denies visual changes  admist denies C/T/LS  Spine pain    PAST MEDICAL:   Medical history unknown    SURGICAL HISTORY:      SOCIAL HISTORY: + - EtOH, + - tobacco, + - drugs    FAMILY HISTORY:  No pertinent family history in first degree relatives      ROS:  CONSTITUTIONAL: No fever, weight loss, or fatigue  EYES: No eye pain, visual disturbances, or discharge  ENMT:  No difficulty hearing, tinnitus, vertigo; No sinus or throat pain  NECK: No pain or stiffness  BREASTS: No pain, masses, or nipple discharge  RESPIRATORY: No cough, wheezing, chills or hemoptysis; No shortness of breath  CARDIOVASCULAR: No chest pain, palpitations, dizziness, or leg swelling  GASTROINTESTINAL: No abdominal or epigastric pain. No nausea, vomiting, or hematemesis; No diarrhea or constipation. No melena or hematochezia.  GENITOURINARY: No dysuria, frequency, hematuria, or incontinence  NEUROLOGICAL: No headaches, memory loss, loss of strength, numbness, or tremors  SKIN: No itching, burning, rashes, or lesions   LYMPH NODES: No enlarged glands  ENDOCRINE: No heat or cold intolerance; No hair loss  MUSCULOSKELETAL: No joint pain or swelling; No muscle, back, or extremity pain  PSYCHIATRIC: No depression, anxiety, mood swings, or difficulty sleeping  HEME/LYMPH: No easy bruising, or bleeding gums  ALLERY AND IMMUNOLOGIC: No hives or eczema      MEDICATIONS:  diphtheria/tetanus/pertussis (acellular) Vaccine (ADAcel) 0.5 milliLiter(s) IntraMuscular once  levETIRAcetam  IVPB 1000 milliGRAM(s) IV Intermittent every 12 hours  sodium chloride 0.9%. 1000 milliLiter(s) (100 mL/Hr) IV Continuous <Continuous>    MEDICATIONS  (PRN):  acetaminophen   Tablet. 650 milliGRAM(s) Oral every 6 hours PRN Mild Pain (1 - 3)      Allergies: Allergy Status Unknown      Vital Signs Last 24 Hrs  T(C): --  T(F): --  HR: 90 (12 Mar 2018 23:32) (90 - 90)  BP: 190/90 (12 Mar 2018 23:32) (190/90 - 190/90)  BP(mean): --  RR: 16 (12 Mar 2018 23:32) (16 - 16)  SpO2: --    PHYSICAL EXAM:  GENERAL: NAD, well-groomed, well-developed  HEAD:  Atraumatic, Normocephalic  EYES: EOMI, PERRLA, conjunctiva and sclera clear  ENMT: No tonsillar erythema, exudates, or enlargement; Moist mucous membranes, Good dentition, No lesions  NECK: Supple, No JVD, Normal thyroid  NERVOUS SYSTEM:  Alert & Oriented X3, Good concentration; Motor Strength 5/5 B/L upper and lower extremities; DTRs 2+ intact and symmetric  CHEST/LUNG: Clear bilaterally; No rales, rhonchi, wheezing, or rubs  HEART: Regular rate and rhythm; No murmurs, rubs, or gallops  ABDOMEN: Soft, Nontender, Nondistended; Bowel sounds present  EXTREMITIES:  2+ Peripheral Pulses, No clubbing, cyanosis, or edema  LYMPH: No lymphadenopathy noted  SKIN: No rashes or lesions      RADIOLOGY & ADDITIONAL STUDIES:                          14.2   8.2   )-----------( 201      ( 12 Mar 2018 23:39 )             42.6           PT/INR - ( 12 Mar 2018 23:39 )   PT: 10.2 sec;   INR: 0.93 ratio         PTT - ( 12 Mar 2018 23:39 )  PTT:30.2 sec      H CC: Patient is a 65y old  Male who presents  S/P  Fall while intoxicated with LOC (12 Mar 2018 23:36)  SOURCE: PATIENT AND PATIENTS WIFE    HPI: Approximately 64yo male s/p fall down 5 steps with LOC per spouse of "a few minutes". Patient consumed many alcoholic drinks this evening. Upon arrival to trauma bay a cervical collar was placed. GCS 14. Primary survey otherwise unremarkable. Patient speaking but confused.   Blood to the right ear. Denies and head ache, no dizziness, no motor or sensory changes, no nausea or vomiting, no incontinence.         PAST MEDICAL:   htn, hypothyroid, hypercholesterolemia, ETOH abuse    SURGICAL HISTORY:  none known    SOCIAL HISTORY: + -EtOH 3 or more scotch/day,   +  tobacco 1/3-1ppd for 20 yrs, quit 8 yrs ago,   - drugs    FAMILY HISTORY:  FATHER: DEMENTIA      ROS: PER WIFE   CONSTITUTIONAL: No fever, weight loss, or fatigue  EYES: No eye pain, visual disturbances, or discharge  ENMT:  No difficulty hearing, tinnitus, vertigo; No sinus or throat pain  RESPIRATORY: No cough, wheezing, chills or hemoptysis; No shortness of breath  CARDIOVASCULAR: No chest pain, palpitations, dizziness, or leg swelling  GASTROINTESTINAL: No abdominal or epigastric pain. No nausea, vomiting, or hematemesis; No diarrhea or constipation. No melena or hematochezia.  GENITOURINARY: No dysuria, frequency, hematuria, or incontinence  NEUROLOGICAL: No headaches, memory loss, loss of strength, numbness, or tremors  SKIN: No itching, burning, rashes, or lesions   LYMPH NODES: No enlarged glands  ENDOCRINE: No heat or cold intolerance; No hair loss  MUSCULOSKELETAL: No joint pain or swelling; No muscle, back, or extremity pain  PSYCHIATRIC: No depression, anxiety, mood swings, or difficulty sleeping  HEME/LYMPH: No easy bruising, or bleeding gums  ALLERY AND IMMUNOLOGIC: No hives or eczema      MEDICATIONS: HOME: SYNTHROID, HCTZ, AMLODIPINE, ASA 81 QD, ATORVASTATIN  diphtheria/tetanus/pertussis (acellular) Vaccine (ADAcel) 0.5 milliLiter(s) IntraMuscular once  levETIRAcetam  IVPB 1000 milliGRAM(s) IV Intermittent every 12 hours  sodium chloride 0.9%. 1000 milliLiter(s) (100 mL/Hr) IV Continuous <Continuous>    MEDICATIONS  (PRN):  acetaminophen   Tablet. 650 milliGRAM(s) Oral every 6 hours PRN Mild Pain (1 - 3)      Allergies: NKA      Vital Signs Last 24 Hrs  T(C): --  T(F): --  HR: 90 (12 Mar 2018 23:32) (90 - 90)  BP: 190/90 (12 Mar 2018 23:32) (190/90 - 190/90)  BP(mean): --  RR: 16 (12 Mar 2018 23:32) (16 - 16)  SpO2: --    PHYSICAL EXAM:  GENERAL: NAD, well-groomed, well-developed,   HEAD:  R HIGH PARIETAL CONTUSION, HEMATOMA, Normocephalic  EYES: EOMI, PERRLA, conjunctiva and sclera clear  NECK:C COLLAR IN PLACE  NERVOUS SYSTEM:  ALERT WITH EYES OPEN, FOLLOWING COMMANDS, COMETIMES NEED REPEAT REQUESTS,  PERRLA, EOMI, FACE SYMMETRIC, TONGUE MIDLINE,   MOTOR: LUE/LLE 5/5,   RUE 5-/5 GRASP AND BICEP, NOTHING AGAINST GRAVITY TRAPS/DELTOID/TRICEP  RLE LOCALIZES , NOTHING AGAINST GRAVITY HIP/KNEE. SENSORY INTACT  UNABLE TO COOPERATE FOR DTR'S, FINE MOTOR/COORDINATION.   CHEST/LUNG: Clear bilaterally; No rales, rhonchi, wheezing, or rubs  HEART: Regular rate   ABDOMEN: Soft, Nontender, Nondistended; OBESE, Bowel sounds present  EXTREMITIES:  RIGHT SHOULDER ECCHYMOSIS/EDEMA/TENDER , DECREASE ROM No clubbing, cyanosis  LYMPH: No lymphadenopathy noted  SKIN: No rashes or lesions      RADIOLOGY & ADDITIONAL STUDIES:          CT HEAD:    Left convexity subdural hematoma measuring up to 5 mm in thickness mainly   along the left frontal and temporal convexities.     Scattered bilateral subarachnoid hemorrhage.     Potential 7 mm hemorrhagic contusion along the left parietal lobe.     Contiguous nondisplaced fracture extending along bilateral parietal bones   and right temporal bone.                14.2   8.2   )-----------( 201      ( 12 Mar 2018 23:39 )             42.6           PT/INR - ( 12 Mar 2018 23:39 )   PT: 10.2 sec;   INR: 0.93 ratio         PTT - ( 12 Mar 2018 23:39 )  PTT:30.2 sec      LABS PENDING

## 2018-03-13 NOTE — CONSULT NOTE ADULT - ATTENDING COMMENTS
PLAN: PLAN: DISCUSSED WITH CHEPE FREGOSO  NEUROSURGERY  ICU  Q1H NEURO CKS  HOB UP 35 DGREES  CT HEAD WITH THIN CUTS TEMP AND PARIETAL IN 6 HRS OR STAT IF ANY NEURO STATUS CHANGE  KEPPRA Q12H  HOLD ASA, ANTIPLATELETS, ANTICOAGS  CIWA

## 2018-03-13 NOTE — PROGRESS NOTE ADULT - SUBJECTIVE AND OBJECTIVE BOX
INTERVAL HPI/OVERNIGHT EVENTS:  s/p Fall Left parietal contusion, right temporal bone fracture. Right shoulder injury and rib fractures 1st.     Pt sitting in bed, Lethargic, eyes opens. Followed some simple commands with encouragement. Oriented self, wife, on Precedex.   Vital Signs Last 24 Hrs  T(C): 36.8 (13 Mar 2018 08:00), Max: 36.8 (13 Mar 2018 08:00)  T(F): 98.2 (13 Mar 2018 08:00), Max: 98.2 (13 Mar 2018 08:00)  HR: 81 (13 Mar 2018 08:00) (65 - 102)  BP: 112/59 (13 Mar 2018 08:00) (106/59 - 190/90)  BP(mean): 88 (13 Mar 2018 07:00) (76 - 120)  RR: 20 (13 Mar 2018 08:00) (14 - 25)  SpO2: 95% (13 Mar 2018 08:00) (92% - 99%)    PHYSICAL EXAM:    GENERAL: NAD, well-groomed, well-developed  HEAD:  Traumatic, Pos swelling the right temporal region, Normocephalic  EYES: EOMI, PERRLA, conjunctiva and sclera clear, small pupils reactive 4mm  ENMT:  Moist mucous membranes, Good dentition, no facial asymm noted, pos dry heme in the right external canal   NECK: bill collar inplace.   NERVOUS SYSTEM:  Alert & Oriented X1-2,  Motor Strength 5/5 Left upper and lower extremities; right side large hematoma/ecchymosis/swelling noted shoulder.   CHEST/LUNG: Clear to percussion bilaterally; No rales, rhonchi, wheezing, or rubs  HEART: Regular rate and rhythm; No murmurs, rubs, or gallops  ABDOMEN: Soft, Nontender, Nondistended; Bowel sounds present  EXTREMITIES:  2+ Peripheral Pulses, No clubbing, cyanosis, or edema  LYMPH: No lymphadenopathy noted  SKIN: No rashes or lesions    MEDICATIONS  (STANDING):  amLODIPine   Tablet 10 milliGRAM(s) Oral daily  atorvastatin 40 milliGRAM(s) Oral at bedtime  dexmedetomidine Infusion 0.2 MICROgram(s)/kG/Hr (5.335 mL/Hr) IV Continuous <Continuous>  docusate sodium 100 milliGRAM(s) Oral three times a day  folic acid Injectable 1 milliGRAM(s) IV Push daily  gabapentin 300 milliGRAM(s) Oral three times a day  hydrochlorothiazide 25 milliGRAM(s) Oral daily  levETIRAcetam  IVPB 1000 milliGRAM(s) IV Intermittent every 12 hours  levothyroxine 200 MICROGram(s) Oral daily  multivitamin 1 Tablet(s) Oral daily  senna 2 Tablet(s) Oral at bedtime  sodium chloride 1.5%. 500 milliLiter(s) (40 mL/Hr) IV Continuous <Continuous>  thiamine Injectable 100 milliGRAM(s) IV Push daily    MEDICATIONS  (PRN):  acetaminophen   Tablet. 650 milliGRAM(s) Oral every 6 hours PRN Mild Pain (1 - 3)      Allergies    Allergy Status Unknown    Intolerances              LABS:                        13.0   9.6   )-----------( 162      ( 13 Mar 2018 07:05 )             38.8     03-13    141  |  100  |  18.0  ----------------------------<  139<H>  4.0   |  25.0  |  0.58    Ca    8.8      13 Mar 2018 07:05  Phos  3.0     03-13  Mg     1.9     03-13    TPro  6.6  /  Alb  3.9  /  TBili  0.3<L>  /  DBili  x   /  AST  41<H>  /  ALT  26  /  AlkPhos  57  03-13    PT/INR - ( 13 Mar 2018 00:30 )   PT: 10.1 sec;   INR: 0.92 ratio         PTT - ( 13 Mar 2018 00:30 )  PTT:31.6 sec      RADIOLOGY & ADDITIONAL TESTS: INTERVAL HPI/OVERNIGHT EVENTS:  s/p Fall Left parietal contusion, right temporal bone fracture. Right shoulder injury and rib fractures 1st.     Pt sitting in bed, Lethargic, eyes opens. Followed some simple commands with encouragement. Oriented self, wife, on Precedex.   Vital Signs Last 24 Hrs  T(C): 36.8 (13 Mar 2018 08:00), Max: 36.8 (13 Mar 2018 08:00)  T(F): 98.2 (13 Mar 2018 08:00), Max: 98.2 (13 Mar 2018 08:00)  HR: 81 (13 Mar 2018 08:00) (65 - 102)  BP: 112/59 (13 Mar 2018 08:00) (106/59 - 190/90)  BP(mean): 88 (13 Mar 2018 07:00) (76 - 120)  RR: 20 (13 Mar 2018 08:00) (14 - 25)  SpO2: 95% (13 Mar 2018 08:00) (92% - 99%)    PHYSICAL EXAM:    GENERAL: NAD, well-groomed, well-developed  HEAD:  Traumatic, Pos swelling the right temporal region, Normocephalic  EYES: EOMI, PERRLA, conjunctiva and sclera clear, small pupils reactive 4mm  ENMT:  Moist mucous membranes, Good dentition, no facial asymm noted, pos dry heme in the right external canal   NECK: bill collar inplace.   NERVOUS SYSTEM:  Alert & Oriented X1-2,  Motor Strength 5/5 Left upper and lower extremities; right side large hematoma/ecchymosis/swelling noted shoulder.   CHEST/LUNG: Clear BS bilaterally; No rales, rhonchi, wheezing, or rubs  HEART: Regular rate and rhythm; No murmurs, rubs, or gallops  ABDOMEN: Soft, Nontender, Nondistended; Bowel sounds present  EXTREMITIES:  2+ Peripheral Pulses, No clubbing, cyanosis, or edema  LYMPH: No lymphadenopathy noted  SKIN: No rashes or lesions    MEDICATIONS  (STANDING):  amLODIPine   Tablet 10 milliGRAM(s) Oral daily  atorvastatin 40 milliGRAM(s) Oral at bedtime  dexmedetomidine Infusion 0.2 MICROgram(s)/kG/Hr (5.335 mL/Hr) IV Continuous <Continuous>  docusate sodium 100 milliGRAM(s) Oral three times a day  folic acid Injectable 1 milliGRAM(s) IV Push daily  gabapentin 300 milliGRAM(s) Oral three times a day  hydrochlorothiazide 25 milliGRAM(s) Oral daily  levETIRAcetam  IVPB 1000 milliGRAM(s) IV Intermittent every 12 hours  levothyroxine 200 MICROGram(s) Oral daily  multivitamin 1 Tablet(s) Oral daily  senna 2 Tablet(s) Oral at bedtime  sodium chloride 1.5%. 500 milliLiter(s) (40 mL/Hr) IV Continuous <Continuous>  thiamine Injectable 100 milliGRAM(s) IV Push daily    MEDICATIONS  (PRN):  acetaminophen   Tablet. 650 milliGRAM(s) Oral every 6 hours PRN Mild Pain (1 - 3)      Allergies    Allergy Status Unknown    Intolerances              LABS:                        13.0   9.6   )-----------( 162      ( 13 Mar 2018 07:05 )             38.8     03-13    141  |  100  |  18.0  ----------------------------<  139<H>  4.0   |  25.0  |  0.58    Ca    8.8      13 Mar 2018 07:05  Phos  3.0     03-13  Mg     1.9     03-13    TPro  6.6  /  Alb  3.9  /  TBili  0.3<L>  /  DBili  x   /  AST  41<H>  /  ALT  26  /  AlkPhos  57  03-13    PT/INR - ( 13 Mar 2018 00:30 )   PT: 10.1 sec;   INR: 0.92 ratio         PTT - ( 13 Mar 2018 00:30 )  PTT:31.6 sec      RADIOLOGY & ADDITIONAL TESTS: INTERVAL HPI/OVERNIGHT EVENTS:  s/p Fall Left parietal contusion, right temporal bone fracture. Right shoulder injury and rib fractures 1st.     Pt sitting in bed, Lethargic, eyes opens. Followed some simple commands with encouragement. Oriented self, wife, on Precedex.   Vital Signs Last 24 Hrs  T(C): 36.8 (13 Mar 2018 08:00), Max: 36.8 (13 Mar 2018 08:00)  T(F): 98.2 (13 Mar 2018 08:00), Max: 98.2 (13 Mar 2018 08:00)  HR: 81 (13 Mar 2018 08:00) (65 - 102)  BP: 112/59 (13 Mar 2018 08:00) (106/59 - 190/90)  BP(mean): 88 (13 Mar 2018 07:00) (76 - 120)  RR: 20 (13 Mar 2018 08:00) (14 - 25)  SpO2: 95% (13 Mar 2018 08:00) (92% - 99%)    PHYSICAL EXAM:    GENERAL: NAD, well-groomed, well-developed  HEAD:  Traumatic, Pos swelling the right temporal region, Normocephalic  EYES: EOMI, PERRLA, conjunctiva and sclera clear, small pupils reactive 4mm  ENMT:  Moist mucous membranes, Good dentition, no facial asymm noted, pos dry heme in the right external canal   NECK: bill collar inplace.   NERVOUS SYSTEM:  Alert & Oriented X1-2,  Motor Strength 5/5 Left upper and lower extremities; right side large hematoma/ecchymosis/swelling noted shoulder. Right sided weakness upper and lower extrem -3/5, maintains right lower extrem externally rotated pos rom.   CHEST/LUNG: Clear BS bilaterally; No rales, rhonchi, wheezing, or rubs  HEART: Regular rate and rhythm; No murmurs, rubs, or gallops  ABDOMEN: Soft, Nontender, Nondistended; Bowel sounds present  EXTREMITIES:  2+ Peripheral Pulses, No clubbing, cyanosis, or edema  LYMPH: No lymphadenopathy noted  SKIN: No rashes or lesions    MEDICATIONS  (STANDING):  amLODIPine   Tablet 10 milliGRAM(s) Oral daily  atorvastatin 40 milliGRAM(s) Oral at bedtime  dexmedetomidine Infusion 0.2 MICROgram(s)/kG/Hr (5.335 mL/Hr) IV Continuous <Continuous>  docusate sodium 100 milliGRAM(s) Oral three times a day  folic acid Injectable 1 milliGRAM(s) IV Push daily  gabapentin 300 milliGRAM(s) Oral three times a day  hydrochlorothiazide 25 milliGRAM(s) Oral daily  levETIRAcetam  IVPB 1000 milliGRAM(s) IV Intermittent every 12 hours  levothyroxine 200 MICROGram(s) Oral daily  multivitamin 1 Tablet(s) Oral daily  senna 2 Tablet(s) Oral at bedtime  sodium chloride 1.5%. 500 milliLiter(s) (40 mL/Hr) IV Continuous <Continuous>  thiamine Injectable 100 milliGRAM(s) IV Push daily    MEDICATIONS  (PRN):  acetaminophen   Tablet. 650 milliGRAM(s) Oral every 6 hours PRN Mild Pain (1 - 3)      Allergies    Allergy Status Unknown    Intolerances    LABS:                        13.0   9.6   )-----------( 162      ( 13 Mar 2018 07:05 )             38.8     03-13    141  |  100  |  18.0  ----------------------------<  139<H>  4.0   |  25.0  |  0.58    Ca    8.8      13 Mar 2018 07:05  Phos  3.0     03-13  Mg     1.9     03-13    TPro  6.6  /  Alb  3.9  /  TBili  0.3<L>  /  DBili  x   /  AST  41<H>  /  ALT  26  /  AlkPhos  57  03-13    PT/INR - ( 13 Mar 2018 00:30 )   PT: 10.1 sec;   INR: 0.92 ratio         PTT - ( 13 Mar 2018 00:30 )  PTT:31.6 sec      RADIOLOGY & ADDITIONAL TESTS:

## 2018-03-13 NOTE — SWALLOW BEDSIDE ASSESSMENT ADULT - ASR SWALLOW ASPIRATION MONITOR
change of breathing pattern/gurgly voice/cough/oral hygiene/pneumonia/upper respiratory infection/fever/throat clearing/position upright (89Y)

## 2018-03-13 NOTE — CONSULT NOTE ADULT - SUBJECTIVE AND OBJECTIVE BOX
Pt Name: FATOUMATA CERVANTES    MRN: [id number]      Ortho consult requested for right distal clavicle fracture seen on Xray. Patient seen and examined at bedside, family at bedside. Patient is currently A and O x 3. Responds to verbal questioning, however inappropriately. Does not follow command appropriately. As per family members.  .    HEALTH ISSUES - PROBLEM Dx:  SDH (subdural hematoma): SDH (subdural hematoma)  SAH (subarachnoid hemorrhage): SAH (subarachnoid hemorrhage)  Fall down stairs, initial encounter: Fall down stairs, initial encounter  Ear bleeding, right: Ear bleeding, right  ETOH abuse: ETOH abuse      .      REVIEW OF SYSTEMS    limited given mental status    ROS is otherwise negative.    PAST MEDICAL & SURGICAL HISTORY:  PAST MEDICAL & SURGICAL HISTORY:  Medical history unknown  Medical history unknown      Allergies: Allergy Status Unknown      Medications: acetaminophen   Tablet. 650 milliGRAM(s) Oral every 6 hours PRN  amLODIPine   Tablet 10 milliGRAM(s) Oral daily  atorvastatin 40 milliGRAM(s) Oral at bedtime  dexmedetomidine Infusion 0.2 MICROgram(s)/kG/Hr IV Continuous <Continuous>  docusate sodium 100 milliGRAM(s) Oral three times a day  folic acid Injectable 1 milliGRAM(s) IV Push daily  gabapentin 300 milliGRAM(s) Oral three times a day  hydrochlorothiazide 25 milliGRAM(s) Oral daily  levETIRAcetam  IVPB 1000 milliGRAM(s) IV Intermittent every 12 hours  levothyroxine 200 MICROGram(s) Oral daily  multivitamin 1 Tablet(s) Oral daily  senna 2 Tablet(s) Oral at bedtime  sodium chloride 1.5%. 500 milliLiter(s) IV Continuous <Continuous>  thiamine Injectable 100 milliGRAM(s) IV Push daily      FAMILY HISTORY:  No pertinent family history in first degree relatives  : non-contributory    Social History:     Ambulation: Walking independently [ ] With Cane [ ] With Walker [ ]  Bedbound [ ]                           13.0   9.6   )-----------( 162      ( 13 Mar 2018 07:05 )             38.8     03-13    141  |  100  |  18.0  ----------------------------<  139<H>  4.0   |  25.0  |  0.58    Ca    8.8      13 Mar 2018 07:05  Phos  3.0     03-13  Mg     1.9     03-13    TPro  6.6  /  Alb  3.9  /  TBili  0.3<L>  /  DBili  x   /  AST  41<H>  /  ALT  26  /  AlkPhos  57        PHYSICAL EXAM:    Vital Signs Last 24 Hrs  T(C): 36.8 (13 Mar 2018 08:00), Max: 36.8 (13 Mar 2018 08:00)  T(F): 98.2 (13 Mar 2018 08:00), Max: 98.2 (13 Mar 2018 08:00)  HR: 71 (13 Mar 2018 10:00) (65 - 102)  BP: 119/63 (13 Mar 2018 10:00) (106/59 - 190/90)  BP(mean): 88 (13 Mar 2018 07:00) (76 - 120)  RR: 20 (13 Mar 2018 10:00) (14 - 25)  SpO2: 95% (13 Mar 2018 10:00) (92% - 99%)  Daily Height in cm: 180.34 (13 Mar 2018 00:45)    Daily Weight in k.7 (13 Mar 2018 00:45)    Appearance: Alert, responsive, in no acute distress.    Neurological: Sensation is grossly intact to light touch. 5/5 motor function of all extremities. No focal deficits or weaknesses found.    Skin: no rash on visible skin. Skin is clean, dry and intact. No bleeding. No abrasions. No ulcerations.    Vascular: 2+ distal pulses. Cap refill < 2 sec. No signs of venous insuffiency or stasis. No extremity ulcerations. No cyanosis.    Musculoskeletal:      RUE: + swelling and ecchymoses noted to right shoulder and clavicle. no tending. no erythema. skin in tact. radial pulse 2+. Patient seen passively flexing fingers. limited exam.    Imaging Studies:    A/P:  Pt is a  67y Male with right clavicle fracture    PLAN:   D/W Dr. Herminio daugherty applied  [facility] care primary team Pt Name: FATOUMATA CERVANTES    MRN: [id number]      Ortho consult requested for right distal clavicle fracture seen on Xray. Patient seen and examined at bedside, family at bedside. Patient is currently A and O x zero. Responds to verbal questioning, however inappropriately. Does not follow command appropriately. As per family members, patient fell with LOC.  .    HEALTH ISSUES - PROBLEM Dx:  SDH (subdural hematoma): SDH (subdural hematoma)  SAH (subarachnoid hemorrhage): SAH (subarachnoid hemorrhage)  Fall down stairs, initial encounter: Fall down stairs, initial encounter  Ear bleeding, right: Ear bleeding, right  ETOH abuse: ETOH abuse      .      REVIEW OF SYSTEMS    limited given mental status    ROS is otherwise negative.    PAST MEDICAL & SURGICAL HISTORY:  PAST MEDICAL & SURGICAL HISTORY:  Medical history unknown  Medical history unknown      Allergies: Allergy Status Unknown      Medications: acetaminophen   Tablet. 650 milliGRAM(s) Oral every 6 hours PRN  amLODIPine   Tablet 10 milliGRAM(s) Oral daily  atorvastatin 40 milliGRAM(s) Oral at bedtime  dexmedetomidine Infusion 0.2 MICROgram(s)/kG/Hr IV Continuous <Continuous>  docusate sodium 100 milliGRAM(s) Oral three times a day  folic acid Injectable 1 milliGRAM(s) IV Push daily  gabapentin 300 milliGRAM(s) Oral three times a day  hydrochlorothiazide 25 milliGRAM(s) Oral daily  levETIRAcetam  IVPB 1000 milliGRAM(s) IV Intermittent every 12 hours  levothyroxine 200 MICROGram(s) Oral daily  multivitamin 1 Tablet(s) Oral daily  senna 2 Tablet(s) Oral at bedtime  sodium chloride 1.5%. 500 milliLiter(s) IV Continuous <Continuous>  thiamine Injectable 100 milliGRAM(s) IV Push daily      FAMILY HISTORY:  No pertinent family history in first degree relatives  : non-contributory    Social History:     Ambulation: Walking independently [ ] With Cane [ ] With Walker [ ]  Bedbound [ ]                           13.0   9.6   )-----------( 162      ( 13 Mar 2018 07:05 )             38.8     03-13    141  |  100  |  18.0  ----------------------------<  139<H>  4.0   |  25.0  |  0.58    Ca    8.8      13 Mar 2018 07:05  Phos  3.0     03-13  Mg     1.9         TPro  6.6  /  Alb  3.9  /  TBili  0.3<L>  /  DBili  x   /  AST  41<H>  /  ALT  26  /  AlkPhos  57        PHYSICAL EXAM:    Vital Signs Last 24 Hrs  T(C): 36.8 (13 Mar 2018 08:00), Max: 36.8 (13 Mar 2018 08:00)  T(F): 98.2 (13 Mar 2018 08:00), Max: 98.2 (13 Mar 2018 08:00)  HR: 71 (13 Mar 2018 10:00) (65 - 102)  BP: 119/63 (13 Mar 2018 10:00) (106/59 - 190/90)  BP(mean): 88 (13 Mar 2018 07:00) (76 - 120)  RR: 20 (13 Mar 2018 10:00) (14 - 25)  SpO2: 95% (13 Mar 2018 10:00) (92% - 99%)  Daily Height in cm: 180.34 (13 Mar 2018 00:45)    Daily Weight in k.7 (13 Mar 2018 00:45)    Appearance: Alert, responsive, in no acute distress.    Neurological: Sensation is grossly intact to light touch. 5/5 motor function of all extremities. No focal deficits or weaknesses found.    Skin: no rash on visible skin. Skin is clean, dry and intact. No bleeding. No abrasions. No ulcerations.    Vascular: 2+ distal pulses. Cap refill < 2 sec. No signs of venous insuffiency or stasis. No extremity ulcerations. No cyanosis.    Musculoskeletal:      RUE: + swelling and ecchymoses noted to right shoulder and clavicle. no tending. no erythema. skin in tact. radial pulse 2+. Patient seen passively flexing fingers. limited exam.    Imaging Studies:    A/P:  Pt is a  67y Male with right clavicle fracture    PLAN:   D/W Dr. Herminio daugherty applied  NW RUE  [facility] care primary team

## 2018-03-13 NOTE — CONSULT NOTE ADULT - ASSESSMENT
IMP: IMP:  Left convexity subdural hematoma measuring up to 5 mm in thickness mainly   along the left frontal and temporal convexities.     Scattered bilateral subarachnoid hemorrhage.     Potential 7 mm hemorrhagic contusion along the left parietal lobe. Follow-up   recommended.     Contiguous nondisplaced fracture extending along bilateral parietal bones   and right temporal bone.     GCS=14  4/6/4  RIGHT SIDE WEAKNESS, ? EXTREMITY INJURY, POSS CVA, INTOXICATION  ETOH ABUSE  ASA 81 QD

## 2018-03-13 NOTE — SWALLOW BEDSIDE ASSESSMENT ADULT - ADDITIONAL RECOMMENDATIONS
FEED ONLY when able to maintain level of alertness appropriate for PO   Upright seated position for PO   Speech-language evaluation, pending MD order

## 2018-03-14 ENCOUNTER — TRANSCRIPTION ENCOUNTER (OUTPATIENT)
Age: 68
End: 2018-03-14

## 2018-03-14 LAB
ANION GAP SERPL CALC-SCNC: 13 MMOL/L — SIGNIFICANT CHANGE UP (ref 5–17)
BASOPHILS # BLD AUTO: 0 K/UL — SIGNIFICANT CHANGE UP (ref 0–0.2)
BASOPHILS NFR BLD AUTO: 0.1 % — SIGNIFICANT CHANGE UP (ref 0–2)
BUN SERPL-MCNC: 13 MG/DL — SIGNIFICANT CHANGE UP (ref 8–20)
CALCIUM SERPL-MCNC: 9 MG/DL — SIGNIFICANT CHANGE UP (ref 8.6–10.2)
CHLORIDE SERPL-SCNC: 101 MMOL/L — SIGNIFICANT CHANGE UP (ref 98–107)
CHOLEST SERPL-MCNC: 170 MG/DL — SIGNIFICANT CHANGE UP (ref 110–199)
CO2 SERPL-SCNC: 26 MMOL/L — SIGNIFICANT CHANGE UP (ref 22–29)
CREAT SERPL-MCNC: 0.49 MG/DL — LOW (ref 0.5–1.3)
EOSINOPHIL # BLD AUTO: 0 K/UL — SIGNIFICANT CHANGE UP (ref 0–0.5)
EOSINOPHIL NFR BLD AUTO: 0.1 % — SIGNIFICANT CHANGE UP (ref 0–5)
GLUCOSE SERPL-MCNC: 122 MG/DL — HIGH (ref 70–115)
HBA1C BLD-MCNC: 5 % — SIGNIFICANT CHANGE UP (ref 4–5.6)
HCT VFR BLD CALC: 37.2 % — LOW (ref 42–52)
HDLC SERPL-MCNC: 109 MG/DL — SIGNIFICANT CHANGE UP
HGB BLD-MCNC: 12.1 G/DL — LOW (ref 14–18)
LIPID PNL WITH DIRECT LDL SERPL: 50 MG/DL — SIGNIFICANT CHANGE UP
LYMPHOCYTES # BLD AUTO: 0.8 K/UL — LOW (ref 1–4.8)
LYMPHOCYTES # BLD AUTO: 7.7 % — LOW (ref 20–55)
MAGNESIUM SERPL-MCNC: 2.2 MG/DL — SIGNIFICANT CHANGE UP (ref 1.6–2.6)
MCHC RBC-ENTMCNC: 29.4 PG — SIGNIFICANT CHANGE UP (ref 27–31)
MCHC RBC-ENTMCNC: 32.5 G/DL — SIGNIFICANT CHANGE UP (ref 32–36)
MCV RBC AUTO: 90.5 FL — SIGNIFICANT CHANGE UP (ref 80–94)
MONOCYTES # BLD AUTO: 1.1 K/UL — HIGH (ref 0–0.8)
MONOCYTES NFR BLD AUTO: 11.7 % — HIGH (ref 3–10)
NEUTROPHILS # BLD AUTO: 7.8 K/UL — SIGNIFICANT CHANGE UP (ref 1.8–8)
NEUTROPHILS NFR BLD AUTO: 80.1 % — HIGH (ref 37–73)
PHOSPHATE SERPL-MCNC: 2 MG/DL — LOW (ref 2.4–4.7)
PLATELET # BLD AUTO: 131 K/UL — LOW (ref 150–400)
POTASSIUM SERPL-MCNC: 3.5 MMOL/L — SIGNIFICANT CHANGE UP (ref 3.5–5.3)
POTASSIUM SERPL-SCNC: 3.5 MMOL/L — SIGNIFICANT CHANGE UP (ref 3.5–5.3)
RBC # BLD: 4.11 M/UL — LOW (ref 4.6–6.2)
RBC # FLD: 13.3 % — SIGNIFICANT CHANGE UP (ref 11–15.6)
SODIUM SERPL-SCNC: 140 MMOL/L — SIGNIFICANT CHANGE UP (ref 135–145)
TOTAL CHOLESTEROL/HDL RATIO MEASUREMENT: 2 RATIO — LOW (ref 3.4–9.6)
TRIGL SERPL-MCNC: 54 MG/DL — SIGNIFICANT CHANGE UP (ref 10–200)
WBC # BLD: 9.8 K/UL — SIGNIFICANT CHANGE UP (ref 4.8–10.8)
WBC # FLD AUTO: 9.8 K/UL — SIGNIFICANT CHANGE UP (ref 4.8–10.8)

## 2018-03-14 PROCEDURE — 99222 1ST HOSP IP/OBS MODERATE 55: CPT

## 2018-03-14 PROCEDURE — 93880 EXTRACRANIAL BILAT STUDY: CPT | Mod: 26

## 2018-03-14 PROCEDURE — 99223 1ST HOSP IP/OBS HIGH 75: CPT

## 2018-03-14 PROCEDURE — 99291 CRITICAL CARE FIRST HOUR: CPT

## 2018-03-14 RX ORDER — DEXMEDETOMIDINE HYDROCHLORIDE IN 0.9% SODIUM CHLORIDE 4 UG/ML
0.3 INJECTION INTRAVENOUS
Qty: 200 | Refills: 0 | Status: DISCONTINUED | OUTPATIENT
Start: 2018-03-14 | End: 2018-03-15

## 2018-03-14 RX ORDER — POTASSIUM PHOSPHATE, MONOBASIC POTASSIUM PHOSPHATE, DIBASIC 236; 224 MG/ML; MG/ML
15 INJECTION, SOLUTION INTRAVENOUS ONCE
Qty: 0 | Refills: 0 | Status: COMPLETED | OUTPATIENT
Start: 2018-03-14 | End: 2018-03-14

## 2018-03-14 RX ORDER — ACETAMINOPHEN 500 MG
1000 TABLET ORAL ONCE
Qty: 0 | Refills: 0 | Status: COMPLETED | OUTPATIENT
Start: 2018-03-14 | End: 2018-03-14

## 2018-03-14 RX ORDER — ACETAMINOPHEN 500 MG
650 TABLET ORAL EVERY 6 HOURS
Qty: 0 | Refills: 0 | Status: DISCONTINUED | OUTPATIENT
Start: 2018-03-14 | End: 2018-03-16

## 2018-03-14 RX ORDER — POTASSIUM CHLORIDE 20 MEQ
10 PACKET (EA) ORAL ONCE
Qty: 0 | Refills: 0 | Status: COMPLETED | OUTPATIENT
Start: 2018-03-14 | End: 2018-03-14

## 2018-03-14 RX ORDER — LANOLIN ALCOHOL/MO/W.PET/CERES
3 CREAM (GRAM) TOPICAL AT BEDTIME
Qty: 0 | Refills: 0 | Status: DISCONTINUED | OUTPATIENT
Start: 2018-03-14 | End: 2018-03-18

## 2018-03-14 RX ADMIN — LEVETIRACETAM 400 MILLIGRAM(S): 250 TABLET, FILM COATED ORAL at 18:49

## 2018-03-14 RX ADMIN — SODIUM CHLORIDE 40 MILLILITER(S): 5 INJECTION, SOLUTION INTRAVENOUS at 06:59

## 2018-03-14 RX ADMIN — ATORVASTATIN CALCIUM 40 MILLIGRAM(S): 80 TABLET, FILM COATED ORAL at 21:41

## 2018-03-14 RX ADMIN — SENNA PLUS 2 TABLET(S): 8.6 TABLET ORAL at 21:41

## 2018-03-14 RX ADMIN — Medication 100 MILLIEQUIVALENT(S): at 06:31

## 2018-03-14 RX ADMIN — Medication 650 MILLIGRAM(S): at 21:47

## 2018-03-14 RX ADMIN — Medication 1 TABLET(S): at 14:26

## 2018-03-14 RX ADMIN — Medication 100 MILLIGRAM(S): at 05:14

## 2018-03-14 RX ADMIN — GABAPENTIN 300 MILLIGRAM(S): 400 CAPSULE ORAL at 21:41

## 2018-03-14 RX ADMIN — Medication 400 MILLIGRAM(S): at 10:23

## 2018-03-14 RX ADMIN — Medication 100 MILLIGRAM(S): at 21:41

## 2018-03-14 RX ADMIN — Medication 1000 MILLIGRAM(S): at 10:38

## 2018-03-14 RX ADMIN — POTASSIUM PHOSPHATE, MONOBASIC POTASSIUM PHOSPHATE, DIBASIC 62.5 MILLIMOLE(S): 236; 224 INJECTION, SOLUTION INTRAVENOUS at 06:59

## 2018-03-14 RX ADMIN — DEXMEDETOMIDINE HYDROCHLORIDE IN 0.9% SODIUM CHLORIDE 5.33 MICROGRAM(S)/KG/HR: 4 INJECTION INTRAVENOUS at 05:15

## 2018-03-14 RX ADMIN — DEXMEDETOMIDINE HYDROCHLORIDE IN 0.9% SODIUM CHLORIDE 8 MICROGRAM(S)/KG/HR: 4 INJECTION INTRAVENOUS at 23:19

## 2018-03-14 RX ADMIN — Medication 25 MILLIGRAM(S): at 05:14

## 2018-03-14 RX ADMIN — LEVETIRACETAM 400 MILLIGRAM(S): 250 TABLET, FILM COATED ORAL at 05:15

## 2018-03-14 RX ADMIN — Medication 100 MILLIGRAM(S): at 14:26

## 2018-03-14 RX ADMIN — Medication 200 MICROGRAM(S): at 05:14

## 2018-03-14 RX ADMIN — Medication 1 MILLIGRAM(S): at 14:25

## 2018-03-14 RX ADMIN — Medication 100 MILLIGRAM(S): at 14:25

## 2018-03-14 RX ADMIN — Medication 3 MILLIGRAM(S): at 21:41

## 2018-03-14 RX ADMIN — GABAPENTIN 300 MILLIGRAM(S): 400 CAPSULE ORAL at 05:14

## 2018-03-14 RX ADMIN — AMLODIPINE BESYLATE 10 MILLIGRAM(S): 2.5 TABLET ORAL at 05:14

## 2018-03-14 RX ADMIN — GABAPENTIN 300 MILLIGRAM(S): 400 CAPSULE ORAL at 14:25

## 2018-03-14 NOTE — DISCHARGE NOTE ADULT - PLAN OF CARE
To be symptom  free and return to baseline activity level Please follow up with Dr. Sheikh from neurosurgery You were evaluated by Ears/nose/ Throat specialist for your right temporal bone fracture and right zygoma fracture, no intervention needed . You must make an appointment with the ent doctor of your choice a number is provided for the group that evaluated here for a Full audiometric testing as out pt .   They feel no treatment for zygoma at this time unless trismus develops Follow-up outpatient with Dr. Durham 2 weeks. Patient is non weight bearing RUE. continue sling. for comfort Please follow up with Dr. Sheikh from neurosurgery in 2 weeks, you completed a 7 day course of keppra for seizure prophylaxis. IF you should have any change in clinical status please return to the nearest ER or call 911 as soon as possible Neurology was consulted for your right sided weakness and aphasia that they attributed to your injuries from the fall, you were found to have incidentally a small left punctate CVA. Please follow up with the neurologist of your choice , the contact info of the one that evaluated you is provided on this discharge Please follow up with the acute care surgery service in 2 weeks of discharge.  Acute care surgery outpatient clinic  [address] [id number] You were evaluated by Ears/nose/ Throat specialist for your right temporal bone fracture and right zygoma fracture, no intervention needed . You must make an appointment with the ENT doctor of your choice - a phone number is provided below for the group that evaluated here for a full audiometric testing as an outpatient. They feel no treatment for zygoma at this time unless trismus develops. Follow-up outpatient with Dr. Durham 2 weeks. Patient is non weight bearing RUE. Continue sling for comfort Neurology was consulted for your right sided weakness and aphasia that they attributed to your injuries from the fall. You were found to have incidentally a small left punctate CVA. Please follow up with the neurologist of your choice - the contact info of the one that evaluated you is provided on below. Please follow up with the acute care surgery service in 2 weeks of discharge.     Acute care surgery outpatient clinic  [address] [facility] -1st floor  [place] [address]  [phone] - call for appointment

## 2018-03-14 NOTE — DISCHARGE NOTE ADULT - PATIENT PORTAL LINK FT
You can access the "[employer] Patient Portal, offered by [facility], by registering with the following website: [email or web address]

## 2018-03-14 NOTE — DISCHARGE NOTE ADULT - CARE PLAN
Principal Discharge DX:	SAH (subarachnoid hemorrhage)  Goal:	To be symptom  free and return to baseline activity level  Assessment and plan of treatment:	Please follow up with Dr. Sheikh from neurosurgery  Secondary Diagnosis:	Zygomatic fracture  Assessment and plan of treatment:	You were evaluated by Ears/nose/ Throat specialist for your right temporal bone fracture and right zygoma fracture, no intervention needed . You must make an appointment with the ent doctor of your choice a number is provided for the group that evaluated here for a Full audiometric testing as out pt .   They feel no treatment for zygoma at this time unless trismus develops  Secondary Diagnosis:	Clavicle fracture  Assessment and plan of treatment:	Follow-up outpatient with Dr. Durham 2 weeks. Patient is non weight bearing RUE. continue sling. for comfort Principal Discharge DX:	SAH (subarachnoid hemorrhage)  Goal:	To be symptom  free and return to baseline activity level  Assessment and plan of treatment:	Please follow up with Dr. Sheikh from neurosurgery in 2 weeks, you completed a 7 day course of keppra for seizure prophylaxis. IF you should have any change in clinical status please return to the nearest ER or call 911 as soon as possible  Secondary Diagnosis:	Zygomatic fracture  Assessment and plan of treatment:	You were evaluated by Ears/nose/ Throat specialist for your right temporal bone fracture and right zygoma fracture, no intervention needed . You must make an appointment with the ent doctor of your choice a number is provided for the group that evaluated here for a Full audiometric testing as out pt .   They feel no treatment for zygoma at this time unless trismus develops  Secondary Diagnosis:	Clavicle fracture  Assessment and plan of treatment:	Follow-up outpatient with Dr. Durham 2 weeks. Patient is non weight bearing RUE. continue sling. for comfort  Secondary Diagnosis:	CVA (cerebral vascular accident)  Assessment and plan of treatment:	Neurology was consulted for your right sided weakness and aphasia that they attributed to your injuries from the fall, you were found to have incidentally a small left punctate CVA. Please follow up with the neurologist of your choice , the contact info of the one that evaluated you is provided on this discharge  Secondary Diagnosis:	Fall down stairs, initial encounter  Assessment and plan of treatment:	Please follow up with the acute care surgery service in 2 weeks of discharge.  Acute care surgery outpatient clinic  [address] [id number] Principal Discharge DX:	SAH (subarachnoid hemorrhage)  Goal:	To be symptom  free and return to baseline activity level  Assessment and plan of treatment:	Please follow up with Dr. Sheikh from neurosurgery in 2 weeks, you completed a 7 day course of keppra for seizure prophylaxis. IF you should have any change in clinical status please return to the nearest ER or call 911 as soon as possible  Secondary Diagnosis:	Zygomatic fracture  Assessment and plan of treatment:	You were evaluated by Ears/nose/ Throat specialist for your right temporal bone fracture and right zygoma fracture, no intervention needed . You must make an appointment with the ENT doctor of your choice - a phone number is provided below for the group that evaluated here for a full audiometric testing as an outpatient. They feel no treatment for zygoma at this time unless trismus develops.  Secondary Diagnosis:	Clavicle fracture  Assessment and plan of treatment:	Follow-up outpatient with Dr. Durham 2 weeks. Patient is non weight bearing RUE. Continue sling for comfort  Secondary Diagnosis:	CVA (cerebral vascular accident)  Assessment and plan of treatment:	Neurology was consulted for your right sided weakness and aphasia that they attributed to your injuries from the fall. You were found to have incidentally a small left punctate CVA. Please follow up with the neurologist of your choice - the contact info of the one that evaluated you is provided on below.  Secondary Diagnosis:	Fall down stairs, initial encounter  Assessment and plan of treatment:	Please follow up with the acute care surgery service in 2 weeks of discharge.     Acute care surgery outpatient clinic  [address] [facility] -1st floor  [facility] [address]  [phone] - call for appointment

## 2018-03-14 NOTE — DISCHARGE NOTE ADULT - CARE PROVIDERS DIRECT ADDRESSES
,DirectAddress_Unknown ,DirectAddress_Unknown,[email or web address],DirectAddress_Unknown ,DirectAddress_Unknown,[email or web address],DirectAddress_Unknown,DirectAddress_Unknown

## 2018-03-14 NOTE — PROGRESS NOTE ADULT - SUBJECTIVE AND OBJECTIVE BOX
Late entry - patient seen at approx 7 30 AM    Patient seen and examined at bedside. Patient opens eyes to physical stimuli, however does not respond to questions/command.    Vital Signs Last 24 Hrs  T(C): 37.1 (14 Mar 2018 08:00), Max: 37.3 (13 Mar 2018 16:00)  T(F): 98.8 (14 Mar 2018 08:00), Max: 99.1 (13 Mar 2018 16:00)  HR: 70 (14 Mar 2018 10:00) (57 - 88)  BP: 158/71 (14 Mar 2018 10:00) (125/60 - 185/81)  BP(mean): 102 (14 Mar 2018 10:00) (87 - 116)  RR: 20 (14 Mar 2018 10:00) (14 - 30)  SpO2: 96% (14 Mar 2018 10:00) (91% - 100%)    RUE: + ecchymoses to right shoulder, unchanged. skin in tact, no tenting. sling noted, in place. radial pulse 2+. Limited exam  LUE: No pain with passive ROM. Ext warm, limited exam  RLE: No pain with passive ROM. Ext warm, limited exam  LLE: No pain with passive ROM. Ext warm, limited exam    A/P: 67 y.o M with right distal clavicle fx  - NWB  - cont sling  - no indication for immediate orthopedic surgical intervention  - cont care primary team

## 2018-03-14 NOTE — DISCHARGE NOTE ADULT - HOSPITAL COURSE
,Approximately 64yo male s/p fall down 5 steps with LOC per spouse of "a few minutes". Patient consumed many alcoholic drinks this evening. Upon arrival to trauma bay a cervical collar was placed. GCS 14. Primary survey otherwise unremarkable. Patient speaking but confused. Only injury noted on secondary survey was blood to the right ear. Base Deficit +4. Lactate pending. No immediate life threats noted so patient taken to CT Scan for further evaluation. CXR performed in the bay prior to transport.   Patient was pan scanned and sustained multiple injuries Left frontal Parietal 1. SDH, 2.Bilateral SAH, 3.ETOH Misuse, 4. Left Ear Lac, 5. Non displaced right distal clavicle fx, 6. Non displaced right anterior first rib fracture 7.Right zygomatic arch fracture, 8. Skull fx bilateral parietal bones, 9. Right temporal bone  Patient was admitted to the ICU and had Ortho, neurosurgery, neurology, PM&R, PT/OT and speech for evaluation  ICU course:  3/13: Admitted for neurochecks. Precedex for agitation  3/13 Repeat CT is concerning for cerebral edema as per NSx – started on hypertonic saline. Continues to have right sided hemiparesis. MRI/MRA of head and neck ordered.  ON CSpine cleared.  Stroke measures ordered  3/14: Swallow eval done, rec. dysphagia 2.  Neuro consulted, no intervention.  OT/PM&R rec inpatient rehab.  Carotid US neg.  ON: Tylenol prn HA ordered. Pt insistent upon DC home and was becoming agitated – Precedex started  3/15:  Pt mental status improved.  Strength to right side improving RUE > RLE.  No signs of withdrawal but did state more anxious  3/16: Very lethargic with worsening right sided weakness this morning. Repeat CT head obtained - evolving changes but no acute infarct or bleeds. Mentation much improved by late morning and neuro exam back to baseline.     3/17: Patient transferred to the floor with no issues,  pt continues to report frontal headaches. Pain does improve when given tylenol or fiorcet but rapidly returns with dissipation of pain meds.  Denies dizziness, change in vision. Pt is mentating well.     3/18: Patient still with complaints of headache. Pain moderately controlled with tylenol or fiorcet. Patient to go to acute rehab and patient's family requesting he be transferred to [facility].  on the case.     3/19: accepted to [facility] awaiting bed availablity, medically stable for transfer to [facility] ,Approximately 64yo male s/p fall down 5 steps with LOC per spouse of "a few minutes". Patient consumed many alcoholic drinks this evening. Upon arrival to trauma bay a cervical collar was placed. GCS 14. Primary survey otherwise unremarkable. Patient speaking but confused. Only injury noted on secondary survey was blood to the right ear. Base Deficit +4. Lactate pending. No immediate life threats noted so patient taken to CT Scan for further evaluation. CXR performed in the bay prior to transport.   Patient was pan scanned and sustained multiple injuries Left frontal Parietal 1. SDH, 2.Bilateral SAH, 3.ETOH Misuse, 4. Left Ear Lac, 5. Non displaced right distal clavicle fx, 6. Non displaced right anterior first rib fracture 7.Right zygomatic arch fracture, 8. Skull fx bilateral parietal bones, 9. Right temporal bone  Patient was admitted to the ICU and had Ortho, neurosurgery, neurology, PM&R, PT/OT and speech for evaluation  ICU course:  3/13: Admitted for neurochecks. Precedex for agitation  3/13 Repeat CT is concerning for cerebral edema as per NSx – started on hypertonic saline. Continues to have right sided hemiparesis. MRI/MRA of head and neck ordered.  ON CSpine cleared.  Stroke measures ordered  3/14: Swallow eval done, rec. dysphagia 2.  Neuro consulted, no intervention.  OT/PM&R rec inpatient rehab.  Carotid US neg.  ON: Tylenol prn HA ordered. Pt insistent upon DC home and was becoming agitated – Precedex started  3/15:  Pt mental status improved.  Strength to right side improving RUE > RLE.  No signs of withdrawal but did state more anxious  3/16: Very lethargic with worsening right sided weakness this morning. Repeat CT head obtained - evolving changes but no acute infarct or bleeds. Mentation much improved by late morning and neuro exam back to baseline.     3/17: Patient transferred to the floor with no issues,  pt continues to report frontal headaches. Pain does improve when given tylenol or fiorcet but rapidly returns with dissipation of pain meds.  Denies dizziness, change in vision. Pt is mentating well.     3/18: Patient still with complaints of headache. Pain moderately controlled with tylenol or fiorcet. Patient to go to acute rehab and patient's family requesting he be transferred to [facility].  on the case.     3/19 - 3/20 accepted to [facility] awaiting bed availablity, medically stable for transfer to [facility]    Patient is advised to RETURN TO THE EMERGENCY DEPARTMENT for any of the following - worsening pain, fever/chills, nausea/vomiting, altered mental status, chest pain, shortness of breath, or any other new / worsening symptom.

## 2018-03-14 NOTE — DISCHARGE NOTE ADULT - ADDITIONAL INSTRUCTIONS
ortho recs:  Follow-up outpatient with Dr. Durham 2 weeks. Patient is non weight bearing RUE. continue sling.

## 2018-03-14 NOTE — DISCHARGE NOTE ADULT - MEDICATION SUMMARY - MEDICATIONS TO TAKE
I will START or STAY ON the medications listed below when I get home from the hospital:    butalbital/acetaminophen/caffeine 50 mg-325 mg-40 mg oral tablet  -- 2 tab(s) by mouth every 8 hours, As needed, headache  -- Indication: For Headache    ibuprofen 400 mg oral tablet  -- 1 tab(s) by mouth every 4 hours, As needed, pain  -- Indication: For Pain    enoxaparin  -- 40 milligram(s) subcutaneous once a day  -- Indication: For DVT ppx    gabapentin 300 mg oral capsule  -- 1 cap(s) by mouth 3 times a day  -- Indication: For Pain    atorvastatin 40 mg oral tablet  -- 1 tab(s) by mouth once a day (at bedtime)  -- Indication: For HLD    amLODIPine 10 mg oral tablet  -- 1 tab(s) by mouth once a day  -- Indication: For HTN    hydroCHLOROthiazide 25 mg oral tablet  -- 1 tab(s) by mouth once a day  -- Indication: For HTN    docusate sodium 100 mg oral capsule  -- 1 cap(s) by mouth 3 times a day  -- Indication: For Constipation    senna oral tablet  -- 2 tab(s) by mouth once a day (at bedtime)  -- Indication: For Constipation    melatonin 5 mg oral tablet  -- 1 tab(s) by mouth once a day (at bedtime)  -- Indication: For Insomnia    levothyroxine 200 mcg (0.2 mg) oral tablet  -- 1 tab(s) by mouth once a day  -- Indication: For Hypothyroid    Multiple Vitamins oral tablet  -- 1 tab(s) by mouth once a day  -- Indication: For Nutritional supplement    folic acid 1 mg oral tablet  -- 1 tab(s) by mouth once a day  -- Indication: For Nutritional supplement    thiamine 100 mg oral tablet  -- 1 tab(s) by mouth once a day  -- Indication: For Nutritional supplement

## 2018-03-14 NOTE — CONSULT NOTE ADULT - ASSESSMENT
The patient is a 67y Male with fall down stairs due to intoxication.  He suffered TBI/concussion and ICH.  Incidental finding of small CVA  can not have ASA or plavix until bleed is stable  PT/OT/Speech therapies should be involved  CIWA protocol  alcohol cessation counselling    will follow with you    Baldo Brown MD PhD   [id number]

## 2018-03-14 NOTE — CONSULT NOTE ADULT - SUBJECTIVE AND OBJECTIVE BOX
67M was admitted on 3/13 after a fall down 5 steps while intoxicated. Had +LOC for a few minutes. In ED, GCS=14.     Imaging showed:  HEAD CT - Left convexity subdural hematoma measuring up to 5 mm in thickness mainly along the left frontal and temporal convexities. Scattered bilateral subarachnoid hemorrhage. Potential 7 mm hemorrhagic contusion along the left parietal lobe. Contiguous nondisplaced fracture extending along bilateral parietal bones and right temporal bone.   BRAIN MRI - Known intracranial hemorrhage with cerebral contusion. Tiny acute subcortical infarct on the left.    RIGHT SHOULDER XR - distal right clavicle fracture deformity    MAX CT - Partially imaged nondisplaced right temporal and bilateral parietal calvarial fractures. No displaced facial fractures. Bilateral left greater than right subdural hematoma and parenchymal contusions with regional mass effect and midline shift. Motion limits evaluation of the mandible. Follow-up exam is suggested to ensure stability    Repeat head CT showed increasing SAH with bilateral contusions. Left SDH is only slightly worsened. There is mild midline shift without hydrocephalus.    He is NWB to the right UE with a sling.     Wife at bedside, patient able to respond, but with significant expressive deficits, that vary in extent. Was initially unable to name wife or state own name, but then stated "i cant move it". Patient with notable right weakness.     REVIEW OF SYSTEMS  Constitutional - No fever, No weight loss, +fatigue  Difficult to provide, given expressive deficits    PAST MEDICAL & SURGICAL HISTORY  Medical history unknown    SOCIAL HISTORY  Smoking - Quit  EtOH - +  Drugs - Denied    FUNCTIONAL HISTORY  Lives with wife  Independent    CURRENT FUNCTIONAL STATUS  Total A    FAMILY HISTORY   No pertinent family history in first degree relatives      RECENT LABS/IMAGING  CBC Full  -  ( 14 Mar 2018 05:05 )  WBC Count : 9.8 K/uL  Hemoglobin : 12.1 g/dL  Hematocrit : 37.2 %  Platelet Count - Automated : 131 K/uL  Mean Cell Volume : 90.5 fl  Mean Cell Hemoglobin : 29.4 pg  Mean Cell Hemoglobin Concentration : 32.5 g/dL  Auto Neutrophil # : 7.8 K/uL  Auto Lymphocyte # : 0.8 K/uL  Auto Monocyte # : 1.1 K/uL  Auto Eosinophil # : 0.0 K/uL  Auto Basophil # : 0.0 K/uL  Auto Neutrophil % : 80.1 %  Auto Lymphocyte % : 7.7 %  Auto Monocyte % : 11.7 %  Auto Eosinophil % : 0.1 %  Auto Basophil % : 0.1 %    03-14    140  |  101  |  13.0  ----------------------------<  122<H>  3.5   |  26.0  |  0.49<L>    Ca    9.0      14 Mar 2018 05:05  Phos  2.0     03-14  Mg     2.2     03-14    TPro  6.6  /  Alb  3.9  /  TBili  0.3<L>  /  DBili  x   /  AST  41<H>  /  ALT  26  /  AlkPhos  57  03-13        VITALS  T(C): 36.9 (03-14-18 @ 12:00), Max: 37.3 (03-13-18 @ 16:00)  HR: 59 (03-14-18 @ 12:00) (57 - 88)  BP: 168/75 (03-14-18 @ 12:00) (125/60 - 185/81)  RR: 20 (03-14-18 @ 12:00) (14 - 30)  SpO2: 98% (03-14-18 @ 12:00) (91% - 100%)  Wt(kg): --    ALLERGIES  Allergy Status Unknown      MEDICATIONS   amLODIPine   Tablet 10 milliGRAM(s) Oral daily  atorvastatin 40 milliGRAM(s) Oral at bedtime  dexmedetomidine Infusion 0.2 MICROgram(s)/kG/Hr IV Continuous <Continuous>  docusate sodium 100 milliGRAM(s) Oral three times a day  folic acid Injectable 1 milliGRAM(s) IV Push daily  gabapentin 300 milliGRAM(s) Oral three times a day  hydrochlorothiazide 25 milliGRAM(s) Oral daily  levETIRAcetam  IVPB 1000 milliGRAM(s) IV Intermittent every 12 hours  levothyroxine 200 MICROGram(s) Oral daily  LORazepam   Injectable 2 milliGRAM(s) IV Push every 2 hours PRN  melatonin 3 milliGRAM(s) Oral at bedtime  multivitamin 1 Tablet(s) Oral daily  senna 2 Tablet(s) Oral at bedtime  thiamine Injectable 100 milliGRAM(s) IV Push daily      ----------------------------------------------------------------------------------------  PHYSICAL EXAM  Constitutional - NAD, Comfortable  HEENT - Facial ecchymosis  Neck - Supple, No limited ROM  Chest - Breathing comfortably, No wheezing  Cardiovascular - S1S2   Abdomen - Soft, Obese   Extremities - Ecchymosis of limbs, swelling of hands  Neurologic Exam -                    Cognitive - Awake, Alert, AAO to wife (was unable to state name)     Communication - Expressive deficits, able to follow some commands - at times needed cues     Motor - Limited by aphasia/slowed processing                    LEFT    UE - ShAB 4/5, EF 4/5, EE 4/5,  4/5                    RIGHT UE - ShAB 0/5, EF 0/5, EE 0/5,  2/5                    LEFT    LE - HF 4/5, KE 4/5, DF 4/5, PF 4/5                    RIGHT LE - HF 0/5, KE 0/5, DF 0/5, PF 0/5      Psychiatric - Affect flat  ----------------------------------------------------------------------------------------  ASSESSMENT/PLAN  67yMale with functional deficits after sustaining a TBI from a fall and with CVA   Pain - Neurontin  EtOH - Ativan, Folate, Thiamine  Seizure PPX - Keppra  Mood/Impulsivity - Likely related to confusion, complicated by aphasia. Consider adding depakote.   Sleep - Melatonin  DVT PPX - SCDs  Rehab - Medically being optimized. Will likely recommend ACUTE inpatient rehabilitation for the functional deficits consisting of 3 hours of therapy/day & 24 hour RN/daily PMR physician for comorbid medical management. Will continue to follow for ongoing rehab needs and recommendations.

## 2018-03-14 NOTE — DISCHARGE NOTE ADULT - SECONDARY DIAGNOSIS.
Zygomatic fracture Clavicle fracture CVA (cerebral vascular accident) Fall down stairs, initial encounter

## 2018-03-14 NOTE — PROGRESS NOTE ADULT - ASSESSMENT
67ym s/p fall pos ETOH abuse.  Pt experiencing right sided weakness. MRI of the brain pos for tiny acute subcortical infarct on the left.  Plan  1. increase activity and diet.  2. physical therapy/occupational therapy  3. on Precedex being evaluated for CIWA  4. May consider Neurology consult- carotid duplex ordered as per stroke protocol  5. Continue to monitor Na and neuro checks.

## 2018-03-14 NOTE — CONSULT NOTE ADULT - CONSULT REASON
CVA
right clavicle fracture
Left convexity subdural hematoma measuring up to 5 mm in thickness mainly   along the left frontal and temporal convexities.   Scattered bilateral subarachnoid hemorrhage.   Potential 7 mm hemorrhagic contusion along the left parietal lobe.   Contiguous nondisplaced fracture extending along bilateral parietal bones   and right temporal bone.

## 2018-03-14 NOTE — PROGRESS NOTE ADULT - SUBJECTIVE AND OBJECTIVE BOX
INTERVAL HPI/OVERNIGHT EVENTS:  s/p Fall post fall day #2  Pt continues to be impulsive.  Has 1 to 1 at bedside. Pt complaining of headache.  Pt continues to have right sided weakness.     MEDICATIONS  (STANDING):  acetaminophen  IVPB. 1000 milliGRAM(s) IV Intermittent once  amLODIPine   Tablet 10 milliGRAM(s) Oral daily  atorvastatin 40 milliGRAM(s) Oral at bedtime  dexmedetomidine Infusion 0.2 MICROgram(s)/kG/Hr (5.335 mL/Hr) IV Continuous <Continuous>  docusate sodium 100 milliGRAM(s) Oral three times a day  folic acid Injectable 1 milliGRAM(s) IV Push daily  gabapentin 300 milliGRAM(s) Oral three times a day  hydrochlorothiazide 25 milliGRAM(s) Oral daily  levETIRAcetam  IVPB 1000 milliGRAM(s) IV Intermittent every 12 hours  levothyroxine 200 MICROGram(s) Oral daily  multivitamin 1 Tablet(s) Oral daily  senna 2 Tablet(s) Oral at bedtime  sodium chloride 1.5%. 500 milliLiter(s) (40 mL/Hr) IV Continuous <Continuous>  thiamine Injectable 100 milliGRAM(s) IV Push daily    MEDICATIONS  (PRN):  Allergies  Allergy Status Unknown  Intolerances      Vital Signs Last 24 Hrs  T(C): 37.1 (14 Mar 2018 08:00), Max: 37.3 (13 Mar 2018 16:00)  T(F): 98.8 (14 Mar 2018 08:00), Max: 99.1 (13 Mar 2018 16:00)  HR: 66 (14 Mar 2018 08:00) (57 - 88)  BP: 170/79 (14 Mar 2018 08:00) (119/63 - 185/81)  BP(mean): 91 (14 Mar 2018 07:00) (87 - 116)  RR: 20 (14 Mar 2018 08:00) (14 - 30)  SpO2: 97% (14 Mar 2018 08:00) (91% - 100%)    PHYSICAL EXAM: Pt awake, alert, resp, follows commands throughout positive weakness of the right side    GENERAL: NAD, well-groomed, well-developed  HEAD:  traumatic, Normocephalic Right sided swelling improving  EYES: EOMI, PERRLA, conjunctiva and sclera clear  ENMT: No facial asymm; Moist mucous membranes, Good dentition, No lesions  NECK: Supple,   NERVOUS SYSTEM:  Alert & Oriented X3, Good concentration; Motor Strength 5/5 left upper and lower extremities; Right sided upper and lower -3/5 not antigravity. right shoulder sling inplace. moves fingers.  CHEST/LUNG: Clear BS bilaterally; No rales, rhonchi, wheezing, or rubs  HEART: Regular rate and rhythm; No murmurs, rubs, or gallops  ABDOMEN: Soft, Nontender, Nondistended; Bowel sounds present  EXTREMITIES:  2+ Peripheral Pulses, No clubbing, cyanosis, or edema    LABS:                        12.1   9.8   )-----------( 131      ( 14 Mar 2018 05:05 )             37.2     03-14    140  |  101  |  13.0  ----------------------------<  122<H>  3.5   |  26.0  |  0.49<L>    Ca    9.0      14 Mar 2018 05:05  Phos  2.0     03-14  Mg     2.2     03-14    TPro  6.6  /  Alb  3.9  /  TBili  0.3<L>  /  DBili  x   /  AST  41<H>  /  ALT  26  /  AlkPhos  57  03-13    PT/INR - ( 13 Mar 2018 00:30 )   PT: 10.1 sec;   INR: 0.92 ratio         PTT - ( 13 Mar 2018 00:30 )  PTT:31.6 sec      RADIOLOGY & ADDITIONAL TESTS:  < from: MR Cervical Spine No Cont (03.13.18 @ 17:56) >  IMPRESSION:  Motion limited exam without gross acute abnormality. Spondylosis without   cord compression  < end of copied text >    < from: MR Head No Cont (03.13.18 @ 16:57) >  IMPRESSION:  Known intracranial hemorrhage with cerebral contusion. Tiny acute   subcortical infarct on the left.    < end of copied text >

## 2018-03-14 NOTE — DISCHARGE NOTE ADULT - PROVIDER TOKENS
MIRNA:'[id number]' TOKEN:'[id number]',TOKEN:'[id number]',TOKEN:'[id number]' TOKEN:'[id number]',TOKEN:'[id number]',TOKEN:'[id number]',TOKEN:'[id number]'

## 2018-03-14 NOTE — PROGRESS NOTE ADULT - SUBJECTIVE AND OBJECTIVE BOX
INCOMPLETE    INTERVAL HPI/OVERNIGHT EVENTS:      MEDICATIONS  (STANDING):  acetaminophen  IVPB. 1000 milliGRAM(s) IV Intermittent once  amLODIPine   Tablet 10 milliGRAM(s) Oral daily  atorvastatin 40 milliGRAM(s) Oral at bedtime  dexmedetomidine Infusion 0.2 MICROgram(s)/kG/Hr (5.335 mL/Hr) IV Continuous <Continuous>  docusate sodium 100 milliGRAM(s) Oral three times a day  folic acid Injectable 1 milliGRAM(s) IV Push daily  gabapentin 300 milliGRAM(s) Oral three times a day  hydrochlorothiazide 25 milliGRAM(s) Oral daily  levETIRAcetam  IVPB 1000 milliGRAM(s) IV Intermittent every 12 hours  levothyroxine 200 MICROGram(s) Oral daily  multivitamin 1 Tablet(s) Oral daily  senna 2 Tablet(s) Oral at bedtime  sodium chloride 1.5%. 500 milliLiter(s) (40 mL/Hr) IV Continuous <Continuous>  thiamine Injectable 100 milliGRAM(s) IV Push daily    MEDICATIONS  (PRN):      Allergies    Allergy Status Unknown    Intolerances          Vital Signs Last 24 Hrs  T(C): 37.1 (14 Mar 2018 08:00), Max: 37.3 (13 Mar 2018 16:00)  T(F): 98.8 (14 Mar 2018 08:00), Max: 99.1 (13 Mar 2018 16:00)  HR: 57 (14 Mar 2018 07:00) (57 - 88)  BP: 125/70 (14 Mar 2018 07:00) (119/63 - 185/81)  BP(mean): 91 (14 Mar 2018 07:00) (87 - 116)  RR: 18 (14 Mar 2018 07:00) (14 - 30)  SpO2: 91% (14 Mar 2018 07:00) (91% - 100%)    PHYSICAL EXAM:    GENERAL: NAD, well-groomed, well-developed  HEAD:  Atraumatic, Normocephalic  EYES: EOMI, PERRLA, conjunctiva and sclera clear  ENMT: No tonsillar erythema, exudates, or enlargement; Moist mucous membranes, Good dentition, No lesions  NECK: Supple, No JVD, Normal thyroid  NERVOUS SYSTEM:  Alert & Oriented X3, Good concentration; Motor Strength 5/5 B/L upper and lower extremities; DTRs 2+ intact and symmetric  CHEST/LUNG: Clear to percussion bilaterally; No rales, rhonchi, wheezing, or rubs  HEART: Regular rate and rhythm; No murmurs, rubs, or gallops  ABDOMEN: Soft, Nontender, Nondistended; Bowel sounds present  EXTREMITIES:  2+ Peripheral Pulses, No clubbing, cyanosis, or edema  LYMPH: No lymphadenopathy noted  SKIN: No rashes or lesions      LABS:                        12.1   9.8   )-----------( 131      ( 14 Mar 2018 05:05 )             37.2     03-14    140  |  101  |  13.0  ----------------------------<  122<H>  3.5   |  26.0  |  0.49<L>    Ca    9.0      14 Mar 2018 05:05  Phos  2.0     03-14  Mg     2.2     03-14    TPro  6.6  /  Alb  3.9  /  TBili  0.3<L>  /  DBili  x   /  AST  41<H>  /  ALT  26  /  AlkPhos  57  03-13    PT/INR - ( 13 Mar 2018 00:30 )   PT: 10.1 sec;   INR: 0.92 ratio         PTT - ( 13 Mar 2018 00:30 )  PTT:31.6 sec      RADIOLOGY & ADDITIONAL TESTS:

## 2018-03-14 NOTE — PROGRESS NOTE ADULT - SUBJECTIVE AND OBJECTIVE BOX
INTERVAL HPI/OVERNIGHT EVENTS/SUBJECTIVE:  Was on Precedex o/n for impulsivity  but now off.  Sensorium somewhat improved but still confused.  Some return of function in RUE, RLE but still limited.  MRA/MRI head and neck reviewed.  Neurology input appreciated.      ICU Vital Signs Last 24 Hrs  T(C): 37.1 (14 Mar 2018 08:00), Max: 37.3 (13 Mar 2018 16:00)  T(F): 98.8 (14 Mar 2018 08:00), Max: 99.1 (13 Mar 2018 16:00)  HR: 70 (14 Mar 2018 10:00) (57 - 88)  BP: 158/71 (14 Mar 2018 10:00) (125/60 - 185/81)  BP(mean): 102 (14 Mar 2018 10:00) (87 - 116)  ABP: --  ABP(mean): --  RR: 20 (14 Mar 2018 10:00) (14 - 30)  SpO2: 96% (14 Mar 2018 10:00) (91% - 100%)      I&O's Detail    13 Mar 2018 07:01  -  14 Mar 2018 07:00  --------------------------------------------------------  IN:    dexmedetomidine Infusion: 322.7 mL    sodium chloride 1.5%.: 760 mL    sodium chloride 3%: 40 mL    Solution: 250 mL    Solution: 100 mL  Total IN: 1472.7 mL    OUT:    Voided: 1715 mL  Total OUT: 1715 mL    Total NET: -242.3 mL      14 Mar 2018 07:01  -  14 Mar 2018 10:48  --------------------------------------------------------  IN:    sodium chloride 1.5%.: 120 mL  Total IN: 120 mL    OUT:    Voided: 200 mL  Total OUT: 200 mL    Total NET: -80 mL                MEDICATIONS  (STANDING):  amLODIPine   Tablet 10 milliGRAM(s) Oral daily  atorvastatin 40 milliGRAM(s) Oral at bedtime  dexmedetomidine Infusion 0.2 MICROgram(s)/kG/Hr (5.335 mL/Hr) IV Continuous <Continuous>  docusate sodium 100 milliGRAM(s) Oral three times a day  folic acid Injectable 1 milliGRAM(s) IV Push daily  gabapentin 300 milliGRAM(s) Oral three times a day  hydrochlorothiazide 25 milliGRAM(s) Oral daily  levETIRAcetam  IVPB 1000 milliGRAM(s) IV Intermittent every 12 hours  levothyroxine 200 MICROGram(s) Oral daily  multivitamin 1 Tablet(s) Oral daily  senna 2 Tablet(s) Oral at bedtime  sodium chloride 1.5%. 500 milliLiter(s) (40 mL/Hr) IV Continuous <Continuous>  thiamine Injectable 100 milliGRAM(s) IV Push daily    MEDICATIONS  (PRN):      NUTRITION/IVF:     CENTRAL LINE:  LOCATION:   DATE INSERTED:  CVP:  SCVO2:    NICKERSON:   DATE INSERTED:    A-LINE:    LOCATION:   DATE INSERTED:   SVV:  CO/CI:     CHEST TUBE:  LOCATION:  DATE INSERTED: OUTPUT/24 HRS:  SUCTION/WATER SEAL:     NG/OG TUBE:  DATE INSERTED:  OUTPUT/24 HRS:    MISC:     PHYSICAL EXAM:    Gen:  NAD    Eyes:  Opens spontaneously    Neurological:  Confused but now verbalizing more.  GCS 14    ENMT:    Neck:  No JVD    Pulmonary:  CTA B    Cardiovascular:  S1S2, regular    Gastrointestinal:  S/NT/ND    Genitourinary:  Voids    Back:    Extremities:  No edema    Skin:  Warm.  Ecchymosis to right shoulder    Musculoskeletal:  Now starting to move RUE and RLE but limited and weak          LABS:  CBC Full  -  ( 14 Mar 2018 05:05 )  WBC Count : 9.8 K/uL  Hemoglobin : 12.1 g/dL  Hematocrit : 37.2 %  Platelet Count - Automated : 131 K/uL  Mean Cell Volume : 90.5 fl  Mean Cell Hemoglobin : 29.4 pg  Mean Cell Hemoglobin Concentration : 32.5 g/dL  Auto Neutrophil # : 7.8 K/uL  Auto Lymphocyte # : 0.8 K/uL  Auto Monocyte # : 1.1 K/uL  Auto Eosinophil # : 0.0 K/uL  Auto Basophil # : 0.0 K/uL  Auto Neutrophil % : 80.1 %  Auto Lymphocyte % : 7.7 %  Auto Monocyte % : 11.7 %  Auto Eosinophil % : 0.1 %  Auto Basophil % : 0.1 %    03-14    140  |  101  |  13.0  ----------------------------<  122<H>  3.5   |  26.0  |  0.49<L>    Ca    9.0      14 Mar 2018 05:05  Phos  2.0     03-14  Mg     2.2     03-14    TPro  6.6  /  Alb  3.9  /  TBili  0.3<L>  /  DBili  x   /  AST  41<H>  /  ALT  26  /  AlkPhos  57  03-13    PT/INR - ( 13 Mar 2018 00:30 )   PT: 10.1 sec;   INR: 0.92 ratio         PTT - ( 13 Mar 2018 00:30 )  PTT:31.6 sec    RECENT CULTURES:      LIVER FUNCTIONS - ( 13 Mar 2018 00:30 )  Alb: 3.9 g/dL / Pro: 6.6 g/dL / ALK PHOS: 57 U/L / ALT: 26 U/L / AST: 41 U/L / GGT: x               CAPILLARY BLOOD GLUCOSE      RADIOLOGY & ADDITIONAL STUDIES:    ASSESSMENT/PLAN:  67yMale presenting with:    Neuro:  Continue hourly neurochecks.  Keppra x 7 days.  Gagapentin.  CIWA protocol.  Thiamine and folate.  F/u NS.  Carotid duplex today.  Neurology input appreciated.  F/u NS.  Dr. Khan to evaluate 2/2 TBI    CV:  Norvasc.  Amlodopine.  Atorvostatin.  D/c 1.5% NS    Pulm:  Supplemental O2 prn.  IS.  Pulmonary toilet    GI/Nutrition:  Regular diet    /Renal:  Voids    ID:  No Abx    Lines/Tubes:  PIV    Endo:  Levothyroxine    Skin:  No issues.    Musculoskeletal:  Sling to RUE.  PT/PMR.      Proph:  SCDs    Dispo:  SICU for traumatic ICH      CRITICAL CARE TIME SPENT:  34 minutes

## 2018-03-14 NOTE — DISCHARGE NOTE ADULT - REASON FOR ADMISSION
Pt. had been drinking and fell with positive LOC, spouse called [phone] Pt. had been drinking and fell with positive LOC, spouse called [phone].

## 2018-03-14 NOTE — CONSULT NOTE ADULT - SUBJECTIVE AND OBJECTIVE BOX
[facility]                                     Neu[facility] at [place]                                 Kevin Broussard, & Nikhil                                  [address]                                        [place], [place], [address]                                             [phone]    HISTORY:    The patient is a 67y Male who fell backwards down 5 stairs after consuming >5 alcoholic beverages.  He has traumatic SAH, SDH and was found to have a small area of restricvted diffusion in thwe left corona radiata.  He is aphasic and has weakness on right likely from injuries than from CVA  Neuro eval is requested    PAST MEDICAL & SURGICAL HISTORY:  Medical history unknown  Medical history unknown      MEDICATIONS  (STANDING):  amLODIPine   Tablet 10 milliGRAM(s) Oral daily  atorvastatin 40 milliGRAM(s) Oral at bedtime  dexmedetomidine Infusion 0.2 MICROgram(s)/kG/Hr (5.335 mL/Hr) IV Continuous <Continuous>  docusate sodium 100 milliGRAM(s) Oral three times a day  folic acid Injectable 1 milliGRAM(s) IV Push daily  gabapentin 300 milliGRAM(s) Oral three times a day  hydrochlorothiazide 25 milliGRAM(s) Oral daily  levETIRAcetam  IVPB 1000 milliGRAM(s) IV Intermittent every 12 hours  levothyroxine 200 MICROGram(s) Oral daily  melatonin 3 milliGRAM(s) Oral at bedtime  multivitamin 1 Tablet(s) Oral daily  senna 2 Tablet(s) Oral at bedtime  thiamine Injectable 100 milliGRAM(s) IV Push daily    MEDICATIONS  (PRN):  LORazepam   Injectable 2 milliGRAM(s) IV Push every 2 hours PRN Symptom-triggered: 2 point increase in CIWA -Ar score and a total score of 7 or LESS      Allergies    Allergy Status Unknown    SOCIAL HISTORY:  (+)_ significant EtOH daily consumption no tob or     FAMILY HISTORY:  No pertinent family history in first degree relatives      ROS:  aphasic    Exam:  Vital Signs Last 24 Hrs  T(C): 36.9 (14 Mar 2018 12:00), Max: 37.3 (13 Mar 2018 16:00)  T(F): 98.4 (14 Mar 2018 12:00), Max: 99.1 (13 Mar 2018 16:00)  HR: 78 (14 Mar 2018 14:01) (57 - 88)  BP: 162/72 (14 Mar 2018 14:01) (125/60 - 185/81)  BP(mean): 102 (14 Mar 2018 10:00) (87 - 116)  RR: 20 (14 Mar 2018 14:01) (14 - 30)  SpO2: 97% (14 Mar 2018 14:01) (91% - 100%)  General: appears slightly uncomforatble    Mental status: The patient is awake, alert, and fully oriented. There is no aphasia.    Cranial nerves: . Pupils react Symmetrically to light. There is no visual field deficit to confrontation. Extraocular motion is full with no nystagmus. There is no ptosis. Facial sensation is intact. Facial musculature is symmetric. Palate elevates symmetrically. Tongue is midline.    Motor: There is normal bulk and tone.  Strength is limited in the right arm and leg due to pain.   Strength is 5/5 in the left arm and leg.    Sensation: Intact to light touch     Reflexes: 1+ throughout and plantar responses are flexor.    Cerebellar: Unable to complete.    LABS:                         12.1   9.8   )-----------( 131      ( 14 Mar 2018 05:05 )             37.2       03-14    140  |  101  |  13.0  ----------------------------<  122<H>  3.5   |  26.0  |  0.49<L>    Ca    9.0      14 Mar 2018 05:05  Phos  2.0     03-14  Mg     2.2     03-14    TPro  6.6  /  Alb  3.9  /  TBili  0.3<L>  /  DBili  x   /  AST  41<H>  /  ALT  26  /  AlkPhos  57  03-13      PT/INR - ( 13 Mar 2018 00:30 )   PT: 10.1 sec;   INR: 0.92 ratio         PTT - ( 13 Mar 2018 00:30 )  PTT:31.6 sec    RADIOLOGY & ADDITIONAL STUDIES:  MRI brain- b/l TSAH, Left SDH and left CR punctate CVA

## 2018-03-14 NOTE — DISCHARGE NOTE ADULT - INSTRUCTIONS
continue a heart healthy diet  advance activity as tolerated with the assistance and guidance of the occupational and physical therapists

## 2018-03-14 NOTE — OCCUPATIONAL THERAPY INITIAL EVALUATION ADULT - DIAGNOSIS, OT EVAL
Right clavicle fx; Bilat Left >Right SDH; Nondisplaced fx of right anterior rib; Tiny acute left subcortical infarct

## 2018-03-14 NOTE — DISCHARGE NOTE ADULT - CARE PROVIDER_API CALL
Cristopher Durham (DO), Orthopaedic Surgery  [address] [place] [address]  Phone: [phone]  Fax: [phone] Cristopher Durham (), Orthopaedic Surgery  [address] [place] [address]  Phone: [phone]  Fax: [phone]    Clayton Sheikh), Neurosurgery  [address] [facility] [address]  [place] [address]  Phone: [phone]  Fax: [phone]    Sesar Mendoza), Otolaryngology  [address] [place] [address]  Phone: [phone]  Fax: [phone] Cristopher Durham (DO), Orthopaedic Surgery  [address] [place] [address]  Phone: [phone]  Fax: [phone]    Clayton Sheikh), Neurosurgery  [address] [facility] [facility]  [address]  [place] [address]  Phone: [phone]  Fax: [phone]    Sesar Mendoza (MD), Otolaryngology  [address] [facility] [address]  [place] [address]  Phone: [phone]  Fax: [phone]    Baldo Brown (MD; PhD), Neurology; Vascular Neurology  [address] [facility]  [address]  [place] [address]  Phone: [phone]  Fax: [phone]

## 2018-03-14 NOTE — OCCUPATIONAL THERAPY INITIAL EVALUATION ADULT - PLANNED THERAPY INTERVENTIONS, OT EVAL
strengthening/transfer training/ADL retraining/balance training/bed mobility training/ROM/IADL retraining/cognitive, visual perceptual/neuromuscular re-education

## 2018-03-15 LAB
ANION GAP SERPL CALC-SCNC: 13 MMOL/L — SIGNIFICANT CHANGE UP (ref 5–17)
ANION GAP SERPL CALC-SCNC: 14 MMOL/L — SIGNIFICANT CHANGE UP (ref 5–17)
BASOPHILS # BLD AUTO: 0 K/UL — SIGNIFICANT CHANGE UP (ref 0–0.2)
BASOPHILS NFR BLD AUTO: 0.1 % — SIGNIFICANT CHANGE UP (ref 0–2)
BUN SERPL-MCNC: 10 MG/DL — SIGNIFICANT CHANGE UP (ref 8–20)
BUN SERPL-MCNC: 14 MG/DL — SIGNIFICANT CHANGE UP (ref 8–20)
CALCIUM SERPL-MCNC: 8.6 MG/DL — SIGNIFICANT CHANGE UP (ref 8.6–10.2)
CALCIUM SERPL-MCNC: 9.2 MG/DL — SIGNIFICANT CHANGE UP (ref 8.6–10.2)
CHLORIDE SERPL-SCNC: 93 MMOL/L — LOW (ref 98–107)
CHLORIDE SERPL-SCNC: 97 MMOL/L — LOW (ref 98–107)
CO2 SERPL-SCNC: 28 MMOL/L — SIGNIFICANT CHANGE UP (ref 22–29)
CO2 SERPL-SCNC: 30 MMOL/L — HIGH (ref 22–29)
CREAT SERPL-MCNC: 0.53 MG/DL — SIGNIFICANT CHANGE UP (ref 0.5–1.3)
CREAT SERPL-MCNC: 0.68 MG/DL — SIGNIFICANT CHANGE UP (ref 0.5–1.3)
EOSINOPHIL # BLD AUTO: 0 K/UL — SIGNIFICANT CHANGE UP (ref 0–0.5)
EOSINOPHIL NFR BLD AUTO: 0.1 % — SIGNIFICANT CHANGE UP (ref 0–5)
GLUCOSE SERPL-MCNC: 124 MG/DL — HIGH (ref 70–115)
GLUCOSE SERPL-MCNC: 246 MG/DL — HIGH (ref 70–115)
HCT VFR BLD CALC: 34.6 % — LOW (ref 42–52)
HGB BLD-MCNC: 11.8 G/DL — LOW (ref 14–18)
LYMPHOCYTES # BLD AUTO: 0.8 K/UL — LOW (ref 1–4.8)
LYMPHOCYTES # BLD AUTO: 9.7 % — LOW (ref 20–55)
MAGNESIUM SERPL-MCNC: 2.3 MG/DL — SIGNIFICANT CHANGE UP (ref 1.6–2.6)
MCHC RBC-ENTMCNC: 30.3 PG — SIGNIFICANT CHANGE UP (ref 27–31)
MCHC RBC-ENTMCNC: 34.1 G/DL — SIGNIFICANT CHANGE UP (ref 32–36)
MCV RBC AUTO: 88.7 FL — SIGNIFICANT CHANGE UP (ref 80–94)
MONOCYTES # BLD AUTO: 0.9 K/UL — HIGH (ref 0–0.8)
MONOCYTES NFR BLD AUTO: 10.9 % — HIGH (ref 3–10)
NEUTROPHILS # BLD AUTO: 6.5 K/UL — SIGNIFICANT CHANGE UP (ref 1.8–8)
NEUTROPHILS NFR BLD AUTO: 78.8 % — HIGH (ref 37–73)
PHOSPHATE SERPL-MCNC: 3.3 MG/DL — SIGNIFICANT CHANGE UP (ref 2.4–4.7)
PLATELET # BLD AUTO: 159 K/UL — SIGNIFICANT CHANGE UP (ref 150–400)
POTASSIUM SERPL-MCNC: 3 MMOL/L — LOW (ref 3.5–5.3)
POTASSIUM SERPL-MCNC: 3.6 MMOL/L — SIGNIFICANT CHANGE UP (ref 3.5–5.3)
POTASSIUM SERPL-SCNC: 3 MMOL/L — LOW (ref 3.5–5.3)
POTASSIUM SERPL-SCNC: 3.6 MMOL/L — SIGNIFICANT CHANGE UP (ref 3.5–5.3)
RBC # BLD: 3.9 M/UL — LOW (ref 4.6–6.2)
RBC # FLD: 13.2 % — SIGNIFICANT CHANGE UP (ref 11–15.6)
SODIUM SERPL-SCNC: 137 MMOL/L — SIGNIFICANT CHANGE UP (ref 135–145)
SODIUM SERPL-SCNC: 138 MMOL/L — SIGNIFICANT CHANGE UP (ref 135–145)
TSH SERPL-MCNC: 0.5 UIU/ML — SIGNIFICANT CHANGE UP (ref 0.27–4.2)
WBC # BLD: 8.3 K/UL — SIGNIFICANT CHANGE UP (ref 4.8–10.8)
WBC # FLD AUTO: 8.3 K/UL — SIGNIFICANT CHANGE UP (ref 4.8–10.8)

## 2018-03-15 PROCEDURE — 99232 SBSQ HOSP IP/OBS MODERATE 35: CPT

## 2018-03-15 RX ORDER — ACETAMINOPHEN 500 MG
1000 TABLET ORAL ONCE
Qty: 0 | Refills: 0 | Status: COMPLETED | OUTPATIENT
Start: 2018-03-15 | End: 2018-03-15

## 2018-03-15 RX ORDER — POTASSIUM CHLORIDE 20 MEQ
40 PACKET (EA) ORAL EVERY 4 HOURS
Qty: 0 | Refills: 0 | Status: COMPLETED | OUTPATIENT
Start: 2018-03-15 | End: 2018-03-15

## 2018-03-15 RX ORDER — POTASSIUM CHLORIDE 20 MEQ
10 PACKET (EA) ORAL
Qty: 0 | Refills: 0 | Status: COMPLETED | OUTPATIENT
Start: 2018-03-15 | End: 2018-03-15

## 2018-03-15 RX ORDER — METOCLOPRAMIDE HCL 10 MG
10 TABLET ORAL ONCE
Qty: 0 | Refills: 0 | Status: COMPLETED | OUTPATIENT
Start: 2018-03-15 | End: 2018-03-15

## 2018-03-15 RX ORDER — ENOXAPARIN SODIUM 100 MG/ML
40 INJECTION SUBCUTANEOUS DAILY
Qty: 0 | Refills: 0 | Status: DISCONTINUED | OUTPATIENT
Start: 2018-03-15 | End: 2018-03-20

## 2018-03-15 RX ORDER — SODIUM CHLORIDE 9 MG/ML
500 INJECTION INTRAMUSCULAR; INTRAVENOUS; SUBCUTANEOUS ONCE
Qty: 0 | Refills: 0 | Status: COMPLETED | OUTPATIENT
Start: 2018-03-15 | End: 2018-03-15

## 2018-03-15 RX ADMIN — Medication 1 TABLET(S): at 12:59

## 2018-03-15 RX ADMIN — GABAPENTIN 300 MILLIGRAM(S): 400 CAPSULE ORAL at 06:29

## 2018-03-15 RX ADMIN — Medication 10 MILLIGRAM(S): at 16:40

## 2018-03-15 RX ADMIN — SODIUM CHLORIDE 1000 MILLILITER(S): 9 INJECTION INTRAMUSCULAR; INTRAVENOUS; SUBCUTANEOUS at 08:36

## 2018-03-15 RX ADMIN — LEVETIRACETAM 400 MILLIGRAM(S): 250 TABLET, FILM COATED ORAL at 18:25

## 2018-03-15 RX ADMIN — Medication 40 MILLIEQUIVALENT(S): at 10:20

## 2018-03-15 RX ADMIN — Medication 100 MILLIEQUIVALENT(S): at 08:36

## 2018-03-15 RX ADMIN — GABAPENTIN 300 MILLIGRAM(S): 400 CAPSULE ORAL at 14:41

## 2018-03-15 RX ADMIN — GABAPENTIN 300 MILLIGRAM(S): 400 CAPSULE ORAL at 21:47

## 2018-03-15 RX ADMIN — Medication 400 MILLIGRAM(S): at 18:53

## 2018-03-15 RX ADMIN — Medication 1000 MILLIGRAM(S): at 19:05

## 2018-03-15 RX ADMIN — Medication 40 MILLIEQUIVALENT(S): at 14:41

## 2018-03-15 RX ADMIN — ENOXAPARIN SODIUM 40 MILLIGRAM(S): 100 INJECTION SUBCUTANEOUS at 21:47

## 2018-03-15 RX ADMIN — Medication 100 MILLIGRAM(S): at 06:29

## 2018-03-15 RX ADMIN — DEXMEDETOMIDINE HYDROCHLORIDE IN 0.9% SODIUM CHLORIDE 8 MICROGRAM(S)/KG/HR: 4 INJECTION INTRAVENOUS at 02:05

## 2018-03-15 RX ADMIN — Medication 100 MILLIEQUIVALENT(S): at 11:26

## 2018-03-15 RX ADMIN — LEVETIRACETAM 400 MILLIGRAM(S): 250 TABLET, FILM COATED ORAL at 05:17

## 2018-03-15 RX ADMIN — Medication 100 MILLIGRAM(S): at 14:41

## 2018-03-15 RX ADMIN — Medication 650 MILLIGRAM(S): at 15:35

## 2018-03-15 RX ADMIN — Medication 100 MILLIEQUIVALENT(S): at 10:17

## 2018-03-15 RX ADMIN — Medication 3 MILLIGRAM(S): at 21:47

## 2018-03-15 RX ADMIN — ATORVASTATIN CALCIUM 40 MILLIGRAM(S): 80 TABLET, FILM COATED ORAL at 21:47

## 2018-03-15 RX ADMIN — Medication 1 MILLIGRAM(S): at 14:40

## 2018-03-15 RX ADMIN — Medication 200 MICROGRAM(S): at 06:29

## 2018-03-15 NOTE — DIETITIAN INITIAL EVALUATION ADULT. - PERTINENT LABORATORY DATA
03-15 Na137 mmol/L Glu 124 mg/dL<H> K+ 3.0 mmol/L<L> Cr  0.53 mg/dL BUN 10.0 mg/dL Phos 3.3 mg/dL Alb n/a   PAB n/a

## 2018-03-15 NOTE — PROGRESS NOTE ADULT - SUBJECTIVE AND OBJECTIVE BOX
Patient in bed, with family at bedside.   Reports pain in shoulder. Otherwise, speech is significantly improved, but continues to have aphasia.  Motor strength in the UE and LE is also improved.     FUNCTIONAL PROGRESS  min A/Setup for eating    REVIEW OF SYSTEMS  Constitutional - No fever,  No fatigue  Neurological - No headaches, +memory loss, +loss of strength, No numbness, No tremors  Skin - No rashes, No lesions   Musculoskeletal - +joint pain, No joint swelling, +muscle pain  Psychiatric - No depression, No anxiety    VITALS  T(C): 37.5 (03-15-18 @ 12:00), Max: 37.5 (03-15-18 @ 12:00)  HR: 74 (03-15-18 @ 13:00) (53 - 98)  BP: 141/68 (03-15-18 @ 13:00) (95/46 - 188/88)  RR: 20 (03-15-18 @ 13:00) (15 - 22)  SpO2: 95% (03-15-18 @ 13:00) (92% - 98%)  Wt(kg): --    MEDICATIONS   acetaminophen   Tablet 650 milliGRAM(s) every 6 hours PRN  amLODIPine   Tablet 10 milliGRAM(s) daily  atorvastatin 40 milliGRAM(s) at bedtime  docusate sodium 100 milliGRAM(s) three times a day  enoxaparin Injectable 40 milliGRAM(s) daily  folic acid Injectable 1 milliGRAM(s) daily  gabapentin 300 milliGRAM(s) three times a day  hydrochlorothiazide 25 milliGRAM(s) daily  levETIRAcetam  IVPB 1000 milliGRAM(s) every 12 hours  levothyroxine 200 MICROGram(s) daily  LORazepam   Injectable 2 milliGRAM(s) every 2 hours PRN  melatonin 3 milliGRAM(s) at bedtime  multivitamin 1 Tablet(s) daily  senna 2 Tablet(s) at bedtime  thiamine Injectable 100 milliGRAM(s) daily      RECENT LABS/IMAGING  CBC Full  -  ( 15 Mar 2018 04:27 )  WBC Count : 8.3 K/uL  Hemoglobin : 11.8 g/dL  Hematocrit : 34.6 %  Platelet Count - Automated : 159 K/uL  Mean Cell Volume : 88.7 fl  Mean Cell Hemoglobin : 30.3 pg  Mean Cell Hemoglobin Concentration : 34.1 g/dL  Auto Neutrophil # : 6.5 K/uL  Auto Lymphocyte # : 0.8 K/uL  Auto Monocyte # : 0.9 K/uL  Auto Eosinophil # : 0.0 K/uL  Auto Basophil # : 0.0 K/uL  Auto Neutrophil % : 78.8 %  Auto Lymphocyte % : 9.7 %  Auto Monocyte % : 10.9 %  Auto Eosinophil % : 0.1 %  Auto Basophil % : 0.1 %    03-15    137  |  93<L>  |  10.0  ----------------------------<  124<H>  3.0<L>   |  30.0<H>  |  0.53    Ca    8.6      15 Mar 2018 04:27  Phos  3.3     03-15  Mg     2.3     03-15        ----------------------------------------------------------------------------------------  PHYSICAL EXAM  Constitutional - NAD, Comfortable  HEENT - Facial ecchymosis  Extremities - Ecchymosis of limbs, swelling of hands  Neurologic Exam -                    Cognitive - Awake, Alert, AAO to self, hospital, part of injury/sitaution     Communication - Significantly improved - continues to have naming deficits and fluency difficulties     Motor -                     RIGHT UE - ShAB -/5, EF 2/5, EE 2/5,  4/5                    RIGHT LE - HE 1/5, KE 1/5, DF 0/5, PF 0/5      Psychiatric - Affect flat, mood more positive  ----------------------------------------------------------------------------------------  ASSESSMENT/PLAN  67yMale with functional deficits after sustaining a TBI from a fall and with CVA   Pain - Neurontin, Tylenol  EtOH - Ativan, Folate, Thiamine  Seizure PPX - Keppra  Mood/Impulsivity - Likely related to confusion, complicated by aphasia. Consider adding depakote.   Sleep - Melatonin  DVT PPX - SCDs, Lovenox  Rehab - Patient is making progress. Continue to recommend ACUTE inpatient rehabilitation for the functional deficits consisting of 3 hours of therapy/day & 24 hour RN/daily PMR physician for comorbid medical management. Will continue to follow for ongoing rehab needs and recommendations.

## 2018-03-15 NOTE — PROGRESS NOTE ADULT - SUBJECTIVE AND OBJECTIVE BOX
INTERVAL HPI/OVERNIGHT EVENTS/SUBJECTIVE:  Agitated o/n requiring Precedex which is now off.  more interactive, more oriented and following more commands.  Pain well-controlled on current regimen.  Carotid duplex negative.  Results of MRI/mrA noted.  Per PMR, is a candidate for acute rehab.  +BM    ICU Vital Signs Last 24 Hrs  T(C): 36.6 (15 Mar 2018 08:00), Max: 37.1 (14 Mar 2018 20:00)  T(F): 97.8 (15 Mar 2018 08:00), Max: 98.8 (14 Mar 2018 20:00)  HR: 62 (15 Mar 2018 09:00) (53 - 82)  BP: 134/88 (15 Mar 2018 09:00) (95/46 - 188/88)  BP(mean): 106 (15 Mar 2018 09:00) (67 - 119)  ABP: --  ABP(mean): --  RR: 21 (15 Mar 2018 09:00) (15 - 22)  SpO2: 95% (15 Mar 2018 09:00) (93% - 98%)      I&O's Detail    14 Mar 2018 07:01  -  15 Mar 2018 07:00  --------------------------------------------------------  IN:    dexmedetomidine Infusion: 39 mL    Oral Fluid: 440 mL    sodium chloride 1.5%: 120 mL  Total IN: 599 mL    OUT:    Voided: 2285 mL  Total OUT: 2285 mL    Total NET: -1686 mL      15 Mar 2018 07:01  -  15 Mar 2018 10:08  --------------------------------------------------------  IN:    Oral Fluid: 480 mL    Solution: 100 mL    Solution: 500 mL  Total IN: 1080 mL    OUT:    Voided: 425 mL  Total OUT: 425 mL    Total NET: 655 mL      MEDICATIONS  (STANDING):  amLODIPine   Tablet 10 milliGRAM(s) Oral daily  atorvastatin 40 milliGRAM(s) Oral at bedtime  dexmedetomidine Infusion 0.3 MICROgram(s)/kG/Hr (8.002 mL/Hr) IV Continuous <Continuous>  docusate sodium 100 milliGRAM(s) Oral three times a day  folic acid Injectable 1 milliGRAM(s) IV Push daily  gabapentin 300 milliGRAM(s) Oral three times a day  hydrochlorothiazide 25 milliGRAM(s) Oral daily  levETIRAcetam  IVPB 1000 milliGRAM(s) IV Intermittent every 12 hours  levothyroxine 200 MICROGram(s) Oral daily  melatonin 3 milliGRAM(s) Oral at bedtime  multivitamin 1 Tablet(s) Oral daily  potassium chloride   Powder 40 milliEquivalent(s) Oral every 4 hours  potassium chloride  10 mEq/100 mL IVPB 10 milliEquivalent(s) IV Intermittent every 1 hour  senna 2 Tablet(s) Oral at bedtime  thiamine Injectable 100 milliGRAM(s) IV Push daily    MEDICATIONS  (PRN):  acetaminophen   Tablet 650 milliGRAM(s) Oral every 6 hours PRN Headache  LORazepam   Injectable 2 milliGRAM(s) IV Push every 2 hours PRN Symptom-triggered: 2 point increase in CIWA -Ar score and a total score of 7 or LESS      NUTRITION/IVF:     CENTRAL LINE:  LOCATION:   DATE INSERTED:  CVP:  SCVO2:    NICKERSON:   DATE INSERTED:    A-LINE:    LOCATION:   DATE INSERTED:   SVV:  CO/CI:     CHEST TUBE:  LOCATION:  DATE INSERTED: OUTPUT/24 HRS:  SUCTION/WATER SEAL:     NG/OG TUBE:  DATE INSERTED:  OUTPUT/24 HRS:    MISC:     PHYSICAL EXAM:    Gen:  NAD    Eyes:  PERRLA    Neurological:  GCS 15.  A+Ox3.  Follows all commands    ENMT:    Neck:  No JVD    Pulmonary:  CTA B    Cardiovascular:  S1S2, regular    Gastrointestinal:  S/NT/ND    Genitourinary:  Voids    Back:    Extremities:  No edema    Skin:  Resolving ecchymosis right shoulder    Musculoskeletal:  Improving function of RUE and RLE.  5/5 strength right hand          LABS:  CBC Full  -  ( 15 Mar 2018 04:27 )  WBC Count : 8.3 K/uL  Hemoglobin : 11.8 g/dL  Hematocrit : 34.6 %  Platelet Count - Automated : 159 K/uL  Mean Cell Volume : 88.7 fl  Mean Cell Hemoglobin : 30.3 pg  Mean Cell Hemoglobin Concentration : 34.1 g/dL  Auto Neutrophil # : 6.5 K/uL  Auto Lymphocyte # : 0.8 K/uL  Auto Monocyte # : 0.9 K/uL  Auto Eosinophil # : 0.0 K/uL  Auto Basophil # : 0.0 K/uL  Auto Neutrophil % : 78.8 %  Auto Lymphocyte % : 9.7 %  Auto Monocyte % : 10.9 %  Auto Eosinophil % : 0.1 %  Auto Basophil % : 0.1 %    03-15    137  |  93<L>  |  10.0  ----------------------------<  124<H>  3.0<L>   |  30.0<H>  |  0.53    Ca    8.6      15 Mar 2018 04:27  Phos  3.3     03-15  Mg     2.3     03-15          RECENT CULTURES:            CAPILLARY BLOOD GLUCOSE      RADIOLOGY & ADDITIONAL STUDIES:    ASSESSMENT/PLAN:  67yMale presenting with:    Neuro:  Continue Keppra for sz ppx.  Melatonin.  Thiamine.  Folate.  change neuro checks to q4hrs    CV:  Continue anti-hypertensives.  Continue anti-hypercholesterolemia med (atorvastatin)    Pulm:  Supplemental O2 prn.      GI/Nutrition:  Diet    /Renal:  Voiding    ID:  No issues    Lines/Tubes:  PIV    Endo:  levothyroxine    Skin:  No issues    Proph:  SCDs, may start chemical prophylaxis    Dispo:  SICU for now, may be able to downgrade to floor later today or tomorrow morning      CRITICAL CARE TIME SPENT:

## 2018-03-15 NOTE — PROGRESS NOTE ADULT - SUBJECTIVE AND OBJECTIVE BOX
INTERVAL HPI/OVERNIGHT EVENTS:  67y Male s/p fall, + LOC, found with left SDH and SAH, also found with tiny left corona radiata infarct. Patient seen sitting comfortably in bed, no complaints. No acute events overnight    Vital Signs Last 24 Hrs  T(C): 36.6 (15 Mar 2018 08:00), Max: 37.1 (14 Mar 2018 20:00)  T(F): 97.8 (15 Mar 2018 08:00), Max: 98.8 (14 Mar 2018 20:00)  HR: 84 (15 Mar 2018 10:00) (53 - 84)  BP: 148/77 (15 Mar 2018 10:00) (95/46 - 188/88)  BP(mean): 104 (15 Mar 2018 10:00) (67 - 119)  RR: 21 (15 Mar 2018 10:00) (15 - 22)  SpO2: 92% (15 Mar 2018 10:00) (92% - 98%)    PHYSICAL EXAM:  GENERAL: NAD, well-groomed, well-developed  HEAD: + traumatic- small L periorbital ecchymosis  MENTAL STATUS: Oriented to person, place and time; Awake; Opens eyes spontaneously; Speech fluent however + aphasia. Noted w/ paraphasic errors with phonemic substitutions, but able to name object purposes (e.g. pen was named as paint that you write with, watch named as whale that you tell time with). Following most commands and even when commands are not followed, the attempt is made (when asked to lift his arm, he lifts his leg)  CRANIAL NERVES: PERRL ~4mm; tracks well; no facial asymmetry; facial sensation grossly intact to light touch b/l;  tongue midline  MOTOR:   Uppers     Delt     Bicep     Tricep     HG  R              0/5       5/5         5/5       5/5  L               5/5       5/5        5/5       5/5  Lowers      HF     KF     KE     PF     DF    EHL  R             2/5     2/5    2/5    0/5   0/5    0/5  L              5/5    5/5    5/5    5/5   5/5    5/5  SENSATION: grossly intact to light touch all extremities  CHEST/LUNG: Clear to auscultation bilaterally  HEART: +S1/+S2; Regular rate and rhythm  ABDOMEN: Soft, nontender, nondistended  EXTREMITIES: RUE ecchymosis. Sling in place  SKIN: Warm, dry    LABS:                        11.8   8.3   )-----------( 159      ( 15 Mar 2018 04:27 )             34.6     03-15    137  |  93<L>  |  10.0  ----------------------------<  124<H>  3.0<L>   |  30.0<H>  |  0.53    Ca    8.6      15 Mar 2018 04:27  Phos  3.3     03-15  Mg     2.3     03-15    03-14 @ 07:01  -  03-15 @ 07:00  --------------------------------------------------------  IN: 599 mL / OUT: 2285 mL / NET: -1686 mL    03-15 @ 07:01  -  03-15 @ 10:55  --------------------------------------------------------  IN: 1080 mL / OUT: 625 mL / NET: 455 mL    RADIOLOGY & ADDITIONAL TESTS:  - from: MR Cervical Spine No Cont (03.13.18 @ 17:56)  IMPRESSION:  Motion limited examwithout gross acute abnormality. Spondylosis without   cord compression    - from: MR Angio Neck No Cont (03.13.18 @ 17:20)  IMPRESSION:  Normal study    - from: MR Angio Head No Cont (03.13.18 @ 16:57)  Impression:  Normal study.    - from: MR Head No Cont (03.13.18 @ 16:57)  IMPRESSION:  Known intracranial hemorrhage with cerebral contusion. Tiny acute   subcortical infarct on the left.    - from: CT Maxillofacial No Cont (03.13.18 @ 06:08)  IMPRESSION:    Partially imaged nondisplaced right temporal and bilateral parietal   calvarial fractures. No displaced facial fractures. Bilateral left   greater than right subdural hematoma and parenchymal contusions with   regional mass effect and midline shift. Motion limits evaluation of the   mandible. Follow-up exam is suggested to ensure stability    - from: CT Head No Cont (03.13.18 @ 06:07)  IMPRESSION:    1)  increasing subarachnoid the blood noted as compared to prior CT. Also   hemorrhagic contusions can be seen in both hemispheres. Aa left-sided   subdural hematoma is only slightly worsened. There is mild midline shift   without hydrocephalus..  2)  no acute infarct seen. Fractures again noted.    - from: CT Cervical Spine No Cont (03.12.18 @ 23:58)  IMPRESSION:  No acute cervical spine fracture. Degenerative changesnoted   Nondisplaced right first rib fracture.  Right distal clavicle fracture    - from: CT Head No Cont (03.12.18 @ 23:57)  IMPRESSION:  Left convexity subdural hematoma measuring up to 5 mm in thickness mainly   along the left frontal and temporal convexities.  Scattered bilateral subarachnoid hemorrhage.  Potential 7 mm hemorrhagic contusion along the left parietal lobe.   Follow-up recommended.  Contiguous nondisplaced fracture extending along bilateral parietal bones   and right temporal bone.   Nondisplaced fracture noted along the posterior aspect of the right   zygomatic arch.

## 2018-03-15 NOTE — PROGRESS NOTE ADULT - ASSESSMENT
A/P: 67y Male s/p fall, + LOC, found with left SDH and SAH, also found with tiny left corona radiata infarct. Right sided weakness improving, especially noted on RUE today.  - Will d/w attending  - PT/OT/Speech/PM&R; Will likely be candidate for acute rehab  - Neurology following  - From neurosurgical perspective would recommend holding ASA 81 x 1 week, /further antiplatelet therapy/therapeutic anticoagulation x 2 weeks  - Continue HOB 30 degrees, serial neuro checks- on Q4   - Likely to be downgraded out of ICU later today or tomorrow  - SUpportive care/further medical management per primary care team

## 2018-03-16 LAB
ANION GAP SERPL CALC-SCNC: 11 MMOL/L — SIGNIFICANT CHANGE UP (ref 5–17)
BASOPHILS # BLD AUTO: 0 K/UL — SIGNIFICANT CHANGE UP (ref 0–0.2)
BASOPHILS NFR BLD AUTO: 0.1 % — SIGNIFICANT CHANGE UP (ref 0–2)
BUN SERPL-MCNC: 12 MG/DL — SIGNIFICANT CHANGE UP (ref 8–20)
CALCIUM SERPL-MCNC: 9.4 MG/DL — SIGNIFICANT CHANGE UP (ref 8.6–10.2)
CHLORIDE SERPL-SCNC: 102 MMOL/L — SIGNIFICANT CHANGE UP (ref 98–107)
CK MB CFR SERPL CALC: 1.1 NG/ML — SIGNIFICANT CHANGE UP (ref 0–6.7)
CK SERPL-CCNC: 195 U/L — SIGNIFICANT CHANGE UP (ref 30–200)
CO2 SERPL-SCNC: 29 MMOL/L — SIGNIFICANT CHANGE UP (ref 22–29)
CREAT SERPL-MCNC: 0.59 MG/DL — SIGNIFICANT CHANGE UP (ref 0.5–1.3)
EOSINOPHIL # BLD AUTO: 0.1 K/UL — SIGNIFICANT CHANGE UP (ref 0–0.5)
EOSINOPHIL NFR BLD AUTO: 0.9 % — SIGNIFICANT CHANGE UP (ref 0–5)
GLUCOSE SERPL-MCNC: 94 MG/DL — SIGNIFICANT CHANGE UP (ref 70–115)
HCT VFR BLD CALC: 37.5 % — LOW (ref 42–52)
HGB BLD-MCNC: 12.4 G/DL — LOW (ref 14–18)
LYMPHOCYTES # BLD AUTO: 1 K/UL — SIGNIFICANT CHANGE UP (ref 1–4.8)
LYMPHOCYTES # BLD AUTO: 12 % — LOW (ref 20–55)
MAGNESIUM SERPL-MCNC: 2.5 MG/DL — SIGNIFICANT CHANGE UP (ref 1.6–2.6)
MCHC RBC-ENTMCNC: 29.5 PG — SIGNIFICANT CHANGE UP (ref 27–31)
MCHC RBC-ENTMCNC: 33.1 G/DL — SIGNIFICANT CHANGE UP (ref 32–36)
MCV RBC AUTO: 89.1 FL — SIGNIFICANT CHANGE UP (ref 80–94)
MONOCYTES # BLD AUTO: 1.1 K/UL — HIGH (ref 0–0.8)
MONOCYTES NFR BLD AUTO: 12.4 % — HIGH (ref 3–10)
NEUTROPHILS # BLD AUTO: 6.5 K/UL — SIGNIFICANT CHANGE UP (ref 1.8–8)
NEUTROPHILS NFR BLD AUTO: 74.5 % — HIGH (ref 37–73)
PHOSPHATE SERPL-MCNC: 3.1 MG/DL — SIGNIFICANT CHANGE UP (ref 2.4–4.7)
PLATELET # BLD AUTO: 172 K/UL — SIGNIFICANT CHANGE UP (ref 150–400)
POTASSIUM SERPL-MCNC: 3.6 MMOL/L — SIGNIFICANT CHANGE UP (ref 3.5–5.3)
POTASSIUM SERPL-SCNC: 3.6 MMOL/L — SIGNIFICANT CHANGE UP (ref 3.5–5.3)
RBC # BLD: 4.21 M/UL — LOW (ref 4.6–6.2)
RBC # FLD: 13.2 % — SIGNIFICANT CHANGE UP (ref 11–15.6)
SODIUM SERPL-SCNC: 142 MMOL/L — SIGNIFICANT CHANGE UP (ref 135–145)
WBC # BLD: 8.7 K/UL — SIGNIFICANT CHANGE UP (ref 4.8–10.8)
WBC # FLD AUTO: 8.7 K/UL — SIGNIFICANT CHANGE UP (ref 4.8–10.8)

## 2018-03-16 PROCEDURE — 70450 CT HEAD/BRAIN W/O DYE: CPT | Mod: 26

## 2018-03-16 PROCEDURE — 99232 SBSQ HOSP IP/OBS MODERATE 35: CPT

## 2018-03-16 RX ORDER — POTASSIUM CHLORIDE 20 MEQ
40 PACKET (EA) ORAL ONCE
Qty: 0 | Refills: 0 | Status: COMPLETED | OUTPATIENT
Start: 2018-03-16 | End: 2018-03-16

## 2018-03-16 RX ORDER — METOCLOPRAMIDE HCL 10 MG
10 TABLET ORAL ONCE
Qty: 0 | Refills: 0 | Status: DISCONTINUED | OUTPATIENT
Start: 2018-03-16 | End: 2018-03-16

## 2018-03-16 RX ORDER — KETOROLAC TROMETHAMINE 30 MG/ML
15 SYRINGE (ML) INJECTION ONCE
Qty: 0 | Refills: 0 | Status: DISCONTINUED | OUTPATIENT
Start: 2018-03-16 | End: 2018-03-16

## 2018-03-16 RX ORDER — METOCLOPRAMIDE HCL 10 MG
10 TABLET ORAL ONCE
Qty: 0 | Refills: 0 | Status: COMPLETED | OUTPATIENT
Start: 2018-03-16 | End: 2018-03-16

## 2018-03-16 RX ORDER — GABAPENTIN 400 MG/1
300 CAPSULE ORAL THREE TIMES A DAY
Qty: 0 | Refills: 0 | Status: DISCONTINUED | OUTPATIENT
Start: 2018-03-16 | End: 2018-03-16

## 2018-03-16 RX ORDER — MORPHINE SULFATE 50 MG/1
2 CAPSULE, EXTENDED RELEASE ORAL EVERY 4 HOURS
Qty: 0 | Refills: 0 | Status: DISCONTINUED | OUTPATIENT
Start: 2018-03-16 | End: 2018-03-16

## 2018-03-16 RX ADMIN — Medication 2 TABLET(S): at 18:55

## 2018-03-16 RX ADMIN — GABAPENTIN 300 MILLIGRAM(S): 400 CAPSULE ORAL at 06:19

## 2018-03-16 RX ADMIN — Medication 3 MILLIGRAM(S): at 22:02

## 2018-03-16 RX ADMIN — Medication 1 MILLIGRAM(S): at 13:46

## 2018-03-16 RX ADMIN — Medication 100 MILLIGRAM(S): at 13:46

## 2018-03-16 RX ADMIN — Medication 25 MILLIGRAM(S): at 06:19

## 2018-03-16 RX ADMIN — AMLODIPINE BESYLATE 10 MILLIGRAM(S): 2.5 TABLET ORAL at 06:19

## 2018-03-16 RX ADMIN — GABAPENTIN 300 MILLIGRAM(S): 400 CAPSULE ORAL at 13:45

## 2018-03-16 RX ADMIN — Medication 650 MILLIGRAM(S): at 06:19

## 2018-03-16 RX ADMIN — Medication 100 MILLIGRAM(S): at 22:02

## 2018-03-16 RX ADMIN — LEVETIRACETAM 400 MILLIGRAM(S): 250 TABLET, FILM COATED ORAL at 17:46

## 2018-03-16 RX ADMIN — ATORVASTATIN CALCIUM 40 MILLIGRAM(S): 80 TABLET, FILM COATED ORAL at 22:02

## 2018-03-16 RX ADMIN — Medication 40 MILLIEQUIVALENT(S): at 07:11

## 2018-03-16 RX ADMIN — Medication 1 TABLET(S): at 13:45

## 2018-03-16 RX ADMIN — Medication 15 MILLIGRAM(S): at 09:54

## 2018-03-16 RX ADMIN — Medication 15 MILLIGRAM(S): at 10:15

## 2018-03-16 RX ADMIN — ENOXAPARIN SODIUM 40 MILLIGRAM(S): 100 INJECTION SUBCUTANEOUS at 13:45

## 2018-03-16 RX ADMIN — GABAPENTIN 300 MILLIGRAM(S): 400 CAPSULE ORAL at 22:02

## 2018-03-16 RX ADMIN — LEVETIRACETAM 400 MILLIGRAM(S): 250 TABLET, FILM COATED ORAL at 06:20

## 2018-03-16 RX ADMIN — Medication 10 MILLIGRAM(S): at 06:11

## 2018-03-16 RX ADMIN — SENNA PLUS 2 TABLET(S): 8.6 TABLET ORAL at 22:02

## 2018-03-16 RX ADMIN — Medication 200 MICROGRAM(S): at 06:19

## 2018-03-16 NOTE — PROGRESS NOTE ADULT - SUBJECTIVE AND OBJECTIVE BOX
INTERVAL EVENTS:  67y Male s/p fall, + LOC, found with left SDH and SAH, also found with tiny left corona radiata infarct. Patient seen sitting up in bed. Headache overnight leading to HCT this am which was improved. About 5/10 currently without vision changes. Exam improving clinically.     Vital Signs Last 24 Hrs  T(C): 36.8 (16 Mar 2018 12:29), Max: 37.2 (15 Mar 2018 16:00)  T(F): 98.2 (16 Mar 2018 12:29), Max: 99 (15 Mar 2018 16:00)  HR: 63 (16 Mar 2018 12:29) (53 - 89)  BP: 153/73 (16 Mar 2018 12:29) (117/63 - 177/66)  BP(mean): 101 (16 Mar 2018 10:00) (82 - 112)  RR: 20 (16 Mar 2018 12:29) (11 - 27)  SpO2: 95% (16 Mar 2018 12:29) (91% - 98%)  PHYSICAL EXAM:  GENERAL: NAD, well-groomed, well-developed  HEAD: + traumatic- small L periorbital ecchymosis  MENTAL STATUS: Oriented to person, place and time; Awake; repetition intact, + paraphasic errors with phonemic substitutions, improved anomia with parietal incorporation, able to name days of week backwards, attention is good, followed one step commands, unable to follow two step, no L-R agnosia, some mild perseveration with motor commands  CRANIAL NERVES: PERRL ~4mm; tracks well; no facial asymmetry; facial sensation grossly intact to light touch b/l;  tongue midline  MOTOR:   Uppers     Delt     R              0/5     Lowers      HF     KF     KE     PF     DF    EHL  R             2/5     2/5    2/5    0/5   0/5    0/5  L              5/5    5/5    5/5    5/5   5/5    5/5  SENSATION: grossly intact to light touch all extremities  CHEST/LUNG: Clear to auscultation bilaterally  HEART: +S1/+S2; Regular rate and rhythm  ABDOMEN: Soft, nontender, nondistended  EXTREMITIES: RUE ecchymosis. Sling in place  SKIN: Warm, dry    LABS:             12.4   8.7   )-----------( 172                   37.5     142  |  102  |  12.0  ----------------------------<  94  3.6   |  29.0  |  0.59    Ca    9.4        Phos  3.1       Mg     2.5          RADIOLOGY & ADDITIONAL TESTS:  CT Head No Cont (03.16.18 @ 08:31)  IMPRESSION: Evolving hemorrhagic contusions and mildly improved bilateral   subarachnoid hemorrhage. Stable intraventricular hemorrhage. Thin   posterior falx and left tentorial leaflet subdural hemorrhage. No   hydrocephalus.    MR Cervical Spine No Cont (03.13.18 @ 17:56)  IMPRESSION:  Motion limited examwithout gross acute abnormality. Spondylosis without   cord compression    - from: MR Angio Neck No Cont (03.13.18 @ 17:20)  IMPRESSION:  Normal study    - from: MR Angio Head No Cont (03.13.18 @ 16:57)  Impression:  Normal study.    - from: MR Head No Cont (03.13.18 @ 16:57)  IMPRESSION:  Known intracranial hemorrhage with cerebral contusion. Tiny acute   subcortical infarct on the left.    - from: CT Maxillofacial No Cont (03.13.18 @ 06:08)  IMPRESSION:    Partially imaged nondisplaced right temporal and bilateral parietal   calvarial fractures. No displaced facial fractures. Bilateral left   greater than right subdural hematoma and parenchymal contusions with   regional mass effect and midline shift. Motion limits evaluation of the   mandible. Follow-up exam is suggested to ensure stability    - from: CT Head No Cont (03.13.18 @ 06:07)  IMPRESSION:    1)  increasing subarachnoid the blood noted as compared to prior CT. Also   hemorrhagic contusions can be seen in both hemispheres. Aa left-sided   subdural hematoma is only slightly worsened. There is mild midline shift   without hydrocephalus..  2)  no acute infarct seen. Fractures again noted.    - from: CT Cervical Spine No Cont (03.12.18 @ 23:58)  IMPRESSION:  No acute cervical spine fracture. Degenerative changesnoted   Nondisplaced right first rib fracture.  Right distal clavicle fracture    - from: CT Head No Cont (03.12.18 @ 23:57)  IMPRESSION:  Left convexity subdural hematoma measuring up to 5 mm in thickness mainly   along the left frontal and temporal convexities.  Scattered bilateral subarachnoid hemorrhage.  Potential 7 mm hemorrhagic contusion along the left parietal lobe.   Follow-up recommended.  Contiguous nondisplaced fracture extending along bilateral parietal bones   and right temporal bone.   Nondisplaced fracture noted along the posterior aspect of the right   zygomatic arch. INTERVAL EVENTS:  67y Male s/p fall, + LOC, found with left SDH and SAH, also found with tiny left corona radiata infarct. Patient seen sitting up in bed. Headache overnight leading to HCT this am which was improved. About 5/10 currently without vision changes. Exam improving clinically.     Vital Signs Last 24 Hrs  T(C): 36.8 (16 Mar 2018 12:29), Max: 37.2 (15 Mar 2018 16:00)  T(F): 98.2 (16 Mar 2018 12:29), Max: 99 (15 Mar 2018 16:00)  HR: 63 (16 Mar 2018 12:29) (53 - 89)  BP: 153/73 (16 Mar 2018 12:29) (117/63 - 177/66)  BP(mean): 101 (16 Mar 2018 10:00) (82 - 112)  RR: 20 (16 Mar 2018 12:29) (11 - 27)  SpO2: 95% (16 Mar 2018 12:29) (91% - 98%)  PHYSICAL EXAM:  GENERAL: NAD, well-groomed, well-developed  HEAD: + traumatic- small L periorbital ecchymosis  MENTAL STATUS: Oriented to person, place and time; Awake; repetition intact, + paraphasic errors with phonemic substitutions, improved anomia with parietal incorporation, able to name days of week backwards, attention is good, followed one step commands, unable to follow two step, no L-R agnosia, some mild perseveration with motor commands  CRANIAL NERVES: PERRL ~4mm; tracks well; no facial asymmetry; facial sensation grossly intact to light touch b/l;  tongue midline  MOTOR:   Uppers     Delt     R              0/5     Lowers      HF     KF     KE     PF     DF    EHL  R             2/5     2/5    2/5    0/5   0/5    0/5  L              5/5    5/5    5/5    5/5   5/5    5/5  SENSATION: grossly intact to light touch all extremities  CHEST/LUNG: Clear to auscultation bilaterally  HEART: +S1/+S2; Regular rate and rhythm  ABDOMEN: Soft, nontender, nondistended  EXTREMITIES: RUE ecchymosis. Sling in place  SKIN: Warm, dry    LABS:             12.4   8.7   )-----------( 172                   37.5     142  |  102  |  12.0  ----------------------------<  94  3.6   |  29.0  |  0.59    Ca    9.4        Phos  3.1       Mg     2.5          RADIOLOGY & ADDITIONAL TESTS:  CT Head No Cont (03.16.18 @ 08:31)  IMPRESSION: Evolving hemorrhagic contusions and mildly improved bilateral   subarachnoid hemorrhage. Stable intraventricular hemorrhage. Thin   posterior falx and left tentorial leaflet subdural hemorrhage. No   hydrocephalus.    MR Cervical Spine No Cont (03.13.18 @ 17:56)  IMPRESSION:  Motion limited examwithout gross acute abnormality. Spondylosis without   cord compression    MR Angio Neck No Cont (03.13.18 @ 17:20)  IMPRESSION:  Normal study    MR Angio Head No Cont (03.13.18 @ 16:57)  Impression:  Normal study.    MR Head No Cont (03.13.18 @ 16:57)  IMPRESSION:  Known intracranial hemorrhage with cerebral contusion. Tiny acute   subcortical infarct on the left.    CT Maxillofacial No Cont (03.13.18 @ 06:08)  IMPRESSION:    Partially imaged nondisplaced right temporal and bilateral parietal   calvarial fractures. No displaced facial fractures. Bilateral left   greater than right subdural hematoma and parenchymal contusions with   regional mass effect and midline shift. Motion limits evaluation of the   mandible. Follow-up exam is suggested to ensure stability    - from: CT Head No Cont (03.13.18 @ 06:07)  IMPRESSION:    1)  increasing subarachnoid the blood noted as compared to prior CT. Also   hemorrhagic contusions can be seen in both hemispheres. Aa left-sided   subdural hematoma is only slightly worsened. There is mild midline shift   without hydrocephalus..  2)  no acute infarct seen. Fractures again noted.    CT Cervical Spine No Cont (03.12.18 @ 23:58)  IMPRESSION:  No acute cervical spine fracture. Degenerative changesnoted   Nondisplaced right first rib fracture.  Right distal clavicle fracture    CT Head No Cont (03.12.18 @ 23:57)  IMPRESSION:  Left convexity subdural hematoma measuring up to 5 mm in thickness mainly   along the left frontal and temporal convexities.  Scattered bilateral subarachnoid hemorrhage.  Potential 7 mm hemorrhagic contusion along the left parietal lobe.   Follow-up recommended.  Contiguous nondisplaced fracture extending along bilateral parietal bones   and right temporal bone.   Nondisplaced fracture noted along the posterior aspect of the right   zygomatic arch.

## 2018-03-16 NOTE — SPEECH LANGUAGE PATHOLOGY EVALUATION - SLP PERTINENT HISTORY OF CURRENT PROBLEM
As per h&p:  s/p fall down 5 steps with LOC per spouse of "a few minutes". Patient consumed many alcoholic drinks this evening. Upon arrival to trauma bay a cervical collar was placed. GCS 14. Primary survey otherwise unremarkable. Patient speaking but confused. CT Head with Acute Left sided SDH/SAH.

## 2018-03-16 NOTE — SPEECH LANGUAGE PATHOLOGY EVALUATION - SLP DIAGNOSIS
Pt with mild-moderate expressive language deficits characterized by word finding deficits, along with reduced insight into paraphasic errors. Pt also presents with at least mild -moderate receptive language deficits for complex information however functional for basic levels of communication. Suspect cognitive linguistic deficits in the area of high level recall and problem solving skills

## 2018-03-16 NOTE — PHYSICAL THERAPY INITIAL EVALUATION ADULT - ACTIVE RANGE OF MOTION EXAMINATION, REHAB EVAL
erin Black NA due to Dx, right LE 0/5/Left LE Active ROM was WFL (within functional limits)/Left UE Active ROM was WFL (within functional limits)

## 2018-03-16 NOTE — PHYSICAL THERAPY INITIAL EVALUATION ADULT - CRITERIA FOR SKILLED THERAPEUTIC INTERVENTIONS
anticipated discharge recommendation/risk reduction/prevention/rehab potential/impairments found/functional limitations in following categories/therapy frequency/anticipated equipment needs at discharge/predicted duration of therapy intervention

## 2018-03-16 NOTE — PHYSICAL THERAPY INITIAL EVALUATION ADULT - ADDITIONAL COMMENTS
As pe rpt.: leaves with wife, 5 steps to enter (-) railings, independent with all activities, (+) driving, no DME

## 2018-03-16 NOTE — PROGRESS NOTE ADULT - ASSESSMENT
67y Male s/p fall, + LOC, found with left SDH and SAH, also found with tiny left corona radiata infarct. Right sided weakness improving. Global aphasia improving.    PLAN:  - d/w attending  - PT/OT/ST/PM&R  - Neurology following  - From neurosurgical perspective would recommend holding ASA 81 x 1 week, /further antiplatelet therapy/therapeutic anticoagulation x 2 weeks  - Continue HOB 30 degrees, serial neuro checks- on Q4   - Likely to be downgraded out of ICU later today or tomorrow  - SUpportive care/further medical management per primary care team 67y Male s/p fall, + LOC, found with left SDH and SAH, also found with tiny left corona radiata infarct. Right sided weakness improving. +aphasia - improving, repetition remains intact  - Repeat HCT this am for headaches - stable/improved    PLAN:  - d/w attending  - PT/OT/ST/PM&R  - From neurosurgical perspective would recommend holding ASA 81 x 1 week from time of injury, /further antiplatelet therapy/therapeutic anticoagulation x 2 weeks  - Continue HOB 30 degrees, serial neuro checks- on Q4   - Recommend mutli-mechanism headache management d/t multifactorial contributory factors including post-concussive migraines -typically improved with medrol dosepak, benadryl, magnesium, phenergan, lidoderm patch  - SUpportive care/further medical management per primary care team    No further inpatient recommendations from NSG standpoint. Patient will follow up in clinic with Dr. Sheikh in 1-2 weeks post d/c

## 2018-03-16 NOTE — PHYSICAL THERAPY INITIAL EVALUATION ADULT - IMPAIRMENTS FOUND, PT EVAL
muscle strength/poor safety awareness/gait, locomotion, and balance/ROM/neuromotor development and sensory integration/aerobic capacity/endurance/decreased midline orientation

## 2018-03-16 NOTE — PHYSICAL THERAPY INITIAL EVALUATION ADULT - PLANNED THERAPY INTERVENTIONS, PT EVAL
stretching/transfer training/bed mobility training/gait training/motor coordination training/neuromuscular re-education/postural re-education/strengthening/balance training/ROM

## 2018-03-16 NOTE — PROGRESS NOTE ADULT - SUBJECTIVE AND OBJECTIVE BOX
full note dictated   right temporal bone fx  bilateral Diaz sign  Non displaced fx of zygomata     No evid of facial nerve injury nor of nystagmus      conservative treatment    Full audiometric testing as out pt   advance diet as tolerated    no treatment for zygoma at this time unless trismus

## 2018-03-16 NOTE — PROGRESS NOTE ADULT - SUBJECTIVE AND OBJECTIVE BOX
Acute Care Surgery / Trauma Surgery / SICU  [facility] [place]  ===============================  Interval/Overnight Events: Very lethargic with worsening right sided weakness this morning. Repeat CT head obtained - evolving changes but no acute infarct or bleeds. Mentation much improved by late morning and neuro exam back to baseline.     VITAL SIGNS:  T(C): 36.8 (18 @ 08:00), Max: 37.5 (03-15-18 @ 12:00)  HR: 67 (18 @ 09:00) (53 - 98)  BP: 159/78 (18 @ 09:00) (117/63 - 177/66)  ABP: --  ABP(mean): --  RR: 23 (18 @ 09:00) (11 - 26)  SpO2: 95% (18 @ 09:00) (91% - 98%)  CVP(mm Hg): --    NEUROLOGY:  Neurologic Medications:  acetaminophen 325 mG/butalbital 50 mG/caffeine 40 mG 1 Tablet(s) Oral every 6 hours PRN  acetaminophen 325 mG/butalbital 50 mG/caffeine 40 mG 2 Tablet(s) Oral every 6 hours PRN  gabapentin 300 milliGRAM(s) Oral three times a day  levETIRAcetam  IVPB 1000 milliGRAM(s) IV Intermittent every 12 hours  melatonin 3 milliGRAM(s) Oral at bedtime    Exam: right sided weakness unchanged, Alert, oriented  CAM ICU: negative  [  x ] Adequacy of sedation and pain control has been assessed and adjusted  Comments:    RESPIRATORY    Exam:   RA    CARDIOVASCULAR  Cardiovascular Medications:  amLODIPine   Tablet 10 milliGRAM(s) Oral daily  hydrochlorothiazide 25 milliGRAM(s) Oral daily    Exam: RRR      Metabolic/FLUIDS/ELECTROLYTES/NUTRITION:  Gastrointestinal Medications:  docusate sodium 100 milliGRAM(s) Oral three times a day  folic acid Injectable 1 milliGRAM(s) IV Push daily  multivitamin 1 Tablet(s) Oral daily  senna 2 Tablet(s) Oral at bedtime  thiamine Injectable 100 milliGRAM(s) IV Push daily    I&O's Detail  I&O's Detail    15 Mar 2018 07:01  -  16 Mar 2018 07:00  --------------------------------------------------------  IN:    Oral Fluid: 1080 mL    Solution: 500 mL    Solution: 300 mL    Solution: 100 mL  Total IN: 1980 mL    OUT:    Voided: 2825 mL  Total OUT: 2825 mL    Total NET: -845 mL      16 Mar 2018 07:  -  16 Mar 2018 10:18  --------------------------------------------------------  IN:    Oral Fluid: 120 mL  Total IN: 120 mL    OUT:    Voided: 200 mL  Total OUT: 200 mL    Total NET: -80 mL        Daily Weight in k (15 Mar 2018 10:10)  03-    142  |  102  |  12.0  ----------------------------<  94  3.6   |  29.0  |  0.59    Ca    9.4      16 Mar 2018 04:57  Phos  3.1     -  Mg     2.5             Diet: dysphagia diet mechanical soft      Endocrine:  CAPILLARY BLOOD GLUCOSE          HEMATOLOGIC:  Hematologic/Oncologic Medications:  enoxaparin Injectable 40 milliGRAM(s) SubCutaneous daily    [x ] DVT Prophylaxis:                                              12.4                  Neurophils% (auto):   74.5   ( @ 04:57):    8.7  )-----------(172          Lymphocytes% (auto):  12.0                                          37.5                   Eosinphils% (auto):   0.9      Manual%: Neutrophils x    ; Lymphocytes x    ; Eosinophils x    ; Bands%: x    ; Blasts x              Comments:    INFECTIOUS DISEASE:    Antimicrobials/Immunologic Medications:    RECENT CULTURES:          ASSESSMENT/PLAN:  67yMale s/p fall  TBI with SDH, SAH  Subcoritcal infarct on left  Temporal bone fracture  ETOH abuse  Clavicle fx  First rib fx  Facial fx - right zyogamtic arch fx  Skull fx  Delirium  HTN  Hypothyroidism        Neuro: Likely sundowning at night; much improved this morning. On going right sided weakness unchanged. Unchanged repeat CT Head this morning.  Complete seven day course of keppra for seizure prophylaxis.  Neurology following for stroke. Nonnarcotic analgesia for headaches. Needs PT/OT/speech eval for dysarthria and right sided weakness.  CIWA but has not required benzos. Continue thiamine, folate, MVI.  ENT consultation for temporal bone fracture.  Maintain sleep/wake cycle. Continue melatonin as sleep aid for nighttime.     CV: Hemodynamically stable. Continue norvasc, lipitor, HCTZ, and ASA    Pulm: on room air. Aspiration precaution.    GI/Nutrition: Dysphagia 2 mechanical soft, thin liquids. Bowel regimen.    /Renal: voiding. adequate UOP    ENDO: Maintain Euglycemia. Continue synthroid for hypothyroidism    HEME: stable    ID: no issues    Skin: no breakdown    MSK: sling for clavicle fracture    Proph: lovenox    Dispo: stable for floor

## 2018-03-17 LAB
ANION GAP SERPL CALC-SCNC: 15 MMOL/L — SIGNIFICANT CHANGE UP (ref 5–17)
BUN SERPL-MCNC: 9 MG/DL — SIGNIFICANT CHANGE UP (ref 8–20)
CALCIUM SERPL-MCNC: 9.2 MG/DL — SIGNIFICANT CHANGE UP (ref 8.6–10.2)
CHLORIDE SERPL-SCNC: 100 MMOL/L — SIGNIFICANT CHANGE UP (ref 98–107)
CO2 SERPL-SCNC: 26 MMOL/L — SIGNIFICANT CHANGE UP (ref 22–29)
CREAT SERPL-MCNC: 0.55 MG/DL — SIGNIFICANT CHANGE UP (ref 0.5–1.3)
GLUCOSE SERPL-MCNC: 115 MG/DL — SIGNIFICANT CHANGE UP (ref 70–115)
HCT VFR BLD CALC: 41.6 % — LOW (ref 42–52)
HGB BLD-MCNC: 13.9 G/DL — LOW (ref 14–18)
MAGNESIUM SERPL-MCNC: 2.1 MG/DL — SIGNIFICANT CHANGE UP (ref 1.8–2.6)
MCHC RBC-ENTMCNC: 29.8 PG — SIGNIFICANT CHANGE UP (ref 27–31)
MCHC RBC-ENTMCNC: 33.4 G/DL — SIGNIFICANT CHANGE UP (ref 32–36)
MCV RBC AUTO: 89.3 FL — SIGNIFICANT CHANGE UP (ref 80–94)
PHOSPHATE SERPL-MCNC: 3.2 MG/DL — SIGNIFICANT CHANGE UP (ref 2.4–4.7)
PLATELET # BLD AUTO: 183 K/UL — SIGNIFICANT CHANGE UP (ref 150–400)
POTASSIUM SERPL-MCNC: 3.4 MMOL/L — LOW (ref 3.5–5.3)
POTASSIUM SERPL-SCNC: 3.4 MMOL/L — LOW (ref 3.5–5.3)
RBC # BLD: 4.66 M/UL — SIGNIFICANT CHANGE UP (ref 4.6–6.2)
RBC # FLD: 13 % — SIGNIFICANT CHANGE UP (ref 11–15.6)
SODIUM SERPL-SCNC: 141 MMOL/L — SIGNIFICANT CHANGE UP (ref 135–145)
WBC # BLD: 9.5 K/UL — SIGNIFICANT CHANGE UP (ref 4.8–10.8)
WBC # FLD AUTO: 9.5 K/UL — SIGNIFICANT CHANGE UP (ref 4.8–10.8)

## 2018-03-17 PROCEDURE — 99231 SBSQ HOSP IP/OBS SF/LOW 25: CPT

## 2018-03-17 RX ORDER — ACETAMINOPHEN 500 MG
1000 TABLET ORAL ONCE
Qty: 0 | Refills: 0 | Status: COMPLETED | OUTPATIENT
Start: 2018-03-17 | End: 2018-03-17

## 2018-03-17 RX ORDER — IBUPROFEN 200 MG
400 TABLET ORAL EVERY 4 HOURS
Qty: 0 | Refills: 0 | Status: DISCONTINUED | OUTPATIENT
Start: 2018-03-17 | End: 2018-03-20

## 2018-03-17 RX ORDER — FOLIC ACID 0.8 MG
1 TABLET ORAL DAILY
Qty: 0 | Refills: 0 | Status: DISCONTINUED | OUTPATIENT
Start: 2018-03-17 | End: 2018-03-20

## 2018-03-17 RX ORDER — POTASSIUM CHLORIDE 20 MEQ
20 PACKET (EA) ORAL
Qty: 0 | Refills: 0 | Status: COMPLETED | OUTPATIENT
Start: 2018-03-17 | End: 2018-03-17

## 2018-03-17 RX ORDER — THIAMINE MONONITRATE (VIT B1) 100 MG
100 TABLET ORAL DAILY
Qty: 0 | Refills: 0 | Status: DISCONTINUED | OUTPATIENT
Start: 2018-03-17 | End: 2018-03-20

## 2018-03-17 RX ADMIN — Medication 400 MILLIGRAM(S): at 07:10

## 2018-03-17 RX ADMIN — Medication 1 TABLET(S): at 16:00

## 2018-03-17 RX ADMIN — Medication 1000 MILLIGRAM(S): at 02:39

## 2018-03-17 RX ADMIN — Medication 1 TABLET(S): at 11:48

## 2018-03-17 RX ADMIN — Medication 20 MILLIEQUIVALENT(S): at 15:27

## 2018-03-17 RX ADMIN — GABAPENTIN 300 MILLIGRAM(S): 400 CAPSULE ORAL at 13:57

## 2018-03-17 RX ADMIN — Medication 100 MILLIGRAM(S): at 12:56

## 2018-03-17 RX ADMIN — Medication 1 TABLET(S): at 15:27

## 2018-03-17 RX ADMIN — Medication 2 TABLET(S): at 21:31

## 2018-03-17 RX ADMIN — Medication 3 MILLIGRAM(S): at 21:31

## 2018-03-17 RX ADMIN — LEVETIRACETAM 400 MILLIGRAM(S): 250 TABLET, FILM COATED ORAL at 17:12

## 2018-03-17 RX ADMIN — Medication 200 MICROGRAM(S): at 05:14

## 2018-03-17 RX ADMIN — SENNA PLUS 2 TABLET(S): 8.6 TABLET ORAL at 21:31

## 2018-03-17 RX ADMIN — Medication 1 TABLET(S): at 09:12

## 2018-03-17 RX ADMIN — Medication 25 MILLIGRAM(S): at 05:13

## 2018-03-17 RX ADMIN — ATORVASTATIN CALCIUM 40 MILLIGRAM(S): 80 TABLET, FILM COATED ORAL at 21:31

## 2018-03-17 RX ADMIN — Medication 2 TABLET(S): at 22:31

## 2018-03-17 RX ADMIN — Medication 400 MILLIGRAM(S): at 08:00

## 2018-03-17 RX ADMIN — Medication 100 MILLIGRAM(S): at 21:31

## 2018-03-17 RX ADMIN — Medication 400 MILLIGRAM(S): at 01:32

## 2018-03-17 RX ADMIN — Medication 100 MILLIGRAM(S): at 05:13

## 2018-03-17 RX ADMIN — Medication 1 TABLET(S): at 10:00

## 2018-03-17 RX ADMIN — GABAPENTIN 300 MILLIGRAM(S): 400 CAPSULE ORAL at 21:31

## 2018-03-17 RX ADMIN — Medication 20 MILLIEQUIVALENT(S): at 11:48

## 2018-03-17 RX ADMIN — LEVETIRACETAM 400 MILLIGRAM(S): 250 TABLET, FILM COATED ORAL at 05:14

## 2018-03-17 RX ADMIN — Medication 20 MILLIEQUIVALENT(S): at 13:57

## 2018-03-17 RX ADMIN — Medication 100 MILLIGRAM(S): at 13:57

## 2018-03-17 RX ADMIN — Medication 1 MILLIGRAM(S): at 12:55

## 2018-03-17 RX ADMIN — GABAPENTIN 300 MILLIGRAM(S): 400 CAPSULE ORAL at 05:13

## 2018-03-17 RX ADMIN — ENOXAPARIN SODIUM 40 MILLIGRAM(S): 100 INJECTION SUBCUTANEOUS at 12:45

## 2018-03-17 RX ADMIN — AMLODIPINE BESYLATE 10 MILLIGRAM(S): 2.5 TABLET ORAL at 05:13

## 2018-03-17 NOTE — PROVIDER CONTACT NOTE (OTHER) - SITUATION
md made aware that pt family requested pt be sent to [facility] today for in-patient rehab made aware spoke to ANM and . both state will be over to see pt and family

## 2018-03-17 NOTE — PROGRESS NOTE ADULT - SUBJECTIVE AND OBJECTIVE BOX
[facility]                                     Neu[facility] [place]                                 [facility]                                  [address]                                        [place], [place], [address]                                             [phone]      Vital signs:  T(C): 36.1 (03-17-18 @ 09:00), Max: 36.8 (03-16-18 @ 12:29)  HR: 70 (03-17-18 @ 09:00) (60 - 79)  BP: 164/86 (03-17-18 @ 09:00) (153/73 - 174/84)  RR: 16 (03-17-18 @ 09:00) (16 - 20)  SpO2: 96% (03-17-18 @ 09:00) (93% - 97%)  Wt(kg): --    Exam:    No new complaints.  Awake and alert.  fully oriented  speech language intact  Pupils react.  Face symmetric smile and sensation  hearing symmetric  tongue ML  right hemiparesis  moves leftt well  intact FT x 4 ext    CT head 3/16- evolving contusions b/l, improved SAH and stable IVH, no new CVA or mass noted  no seizures

## 2018-03-17 NOTE — PROGRESS NOTE ADULT - ASSESSMENT
A/P: 67y Male s/p fall, + LOC, found with left SDH and SAH, also found with tiny left corona radiata infarct. Right sided weakness unchanged, aphasia improving. Repeat CT head 3/16/18 improved/stable  - Will d/w attending  - Pain control  - Keppra for seizure ppx x 7 days (ok to d/c 3/20/18 unless evidence of seizures)  - Physical therapy following  - Occupational therapy following  - Speech following  - PM&R following- recommends acute rehab  - No further inpatient neurosurgical recommendations. Once deemed medically stable for discharge, follow up with Dr. Sheikh in his office in 2 weeks  - Dispo pending

## 2018-03-17 NOTE — PROGRESS NOTE ADULT - SUBJECTIVE AND OBJECTIVE BOX
INTERVAL HPI/OVERNIGHT EVENTS:  Patient was seen and examined at bedside this AM. pt continues to report frontal headaches. Pain does improve when given tylenol or fiorcet but rapidly returns with dissipation of pain meds.  Denies dizziness, change in vision. Pt is mentating well.    STATUS POST:      POST OPERATIVE DAY #:       MEDICATIONS  (STANDING):  amLODIPine   Tablet 10 milliGRAM(s) Oral daily  atorvastatin 40 milliGRAM(s) Oral at bedtime  docusate sodium 100 milliGRAM(s) Oral three times a day  enoxaparin Injectable 40 milliGRAM(s) SubCutaneous daily  folic acid Injectable 1 milliGRAM(s) IV Push daily  gabapentin 300 milliGRAM(s) Oral three times a day  hydrochlorothiazide 25 milliGRAM(s) Oral daily  levETIRAcetam  IVPB 1000 milliGRAM(s) IV Intermittent every 12 hours  levothyroxine 200 MICROGram(s) Oral daily  melatonin 3 milliGRAM(s) Oral at bedtime  multivitamin 1 Tablet(s) Oral daily  senna 2 Tablet(s) Oral at bedtime  thiamine Injectable 100 milliGRAM(s) IV Push daily    MEDICATIONS  (PRN):  acetaminophen 325 mG/butalbital 50 mG/caffeine 40 mG 1 Tablet(s) Oral every 6 hours PRN moderate headache  acetaminophen 325 mG/butalbital 50 mG/caffeine 40 mG 2 Tablet(s) Oral every 6 hours PRN severe headache  ibuprofen  Tablet 400 milliGRAM(s) Oral every 4 hours PRN pain      Vital Signs Last 24 Hrs  T(C): 36.6 (17 Mar 2018 00:27), Max: 36.8 (16 Mar 2018 12:29)  T(F): 97.9 (17 Mar 2018 00:27), Max: 98.2 (16 Mar 2018 12:29)  HR: 70 (17 Mar 2018 09:00) (60 - 79)  BP: 164/86 (17 Mar 2018 09:00) (143/55 - 174/84)  BP(mean): 101 (16 Mar 2018 10:00) (101 - 101)  RR: 18 (17 Mar 2018 05:11) (18 - 27)  SpO2: 94% (17 Mar 2018 05:11) (93% - 97%)    Physical Exam:  Gen: NAD  Neurological:  No sensory/motor deficits  HEENT: PERRLA, EOMI  Respiratory: Breath Sounds equal & CTA bilaterally, no accessory muscle use  Cardiovascular: Regular rate & rhythm, normal S1, S2; no murmurs, gallops or rubs  Gastrointestinal: Soft, non-tender, nondistended  Vascular: Equal and normal pulses: 2+ peripheral pulses throughout  Musculoskeletal: No joint pain, swelling or deformity; no limitation of movement  Skin: No rashes      I&O's Detail    16 Mar 2018 07:01  -  17 Mar 2018 07:00  --------------------------------------------------------  IN:    Oral Fluid: 120 mL  Total IN: 120 mL    OUT:    Voided: 1200 mL  Total OUT: 1200 mL    Total NET: -1080 mL          LABS:                        13.9   9.5   )-----------( 183      ( 17 Mar 2018 07:30 )             41.6     03-17    141  |  100  |  9.0  ----------------------------<  115  3.4<L>   |  26.0  |  0.55    Ca    9.2      17 Mar 2018 07:30  Phos  3.2     03-17  Mg     2.1     03-17            RADIOLOGY & ADDITIONAL STUDIES:

## 2018-03-17 NOTE — PROGRESS NOTE ADULT - ASSESSMENT
The patient is a 67y Male with fall and ICH, small CVA found incidentally on MRI brain  He is neurologically stable and should be in acute rehab as soon as possible when medically/trauma stable.    continue current care    Thank you for allowing me to participate in the care of your patient    Baldo Brown MD, PhD   [id number]

## 2018-03-17 NOTE — PROVIDER CONTACT NOTE (MEDICATION) - BACKGROUND
pt c/o HA through the night, 10/10, fiorcet given earlier with no affect, bp elevated, surgical team aware, pt awaiting to be seen, tylenol iv eventually ordered, still no affect,

## 2018-03-17 NOTE — PROGRESS NOTE ADULT - ASSESSMENT
66yo male s/p fall presents with TBI with SDH, SAH, Subcoritcal infarct on left, Temporal bone fracture, Clavicle fx, First rib fx, Facial fx - right zyogamtic arch fx, and Skull fx  -Hemodynamically stable  -transfer to floors from SICU overnight    PLan:   -Pain Control PRN  -Complete seven day course of keppra for seizure prophylaxis (last day 3/18)  -Continue thiamine, folate, MVI  -CIWA monitoring  -Home medications  -Plastic Surgery recs non operative for zygomatic fracture  -Ortho recs non operative for clavicle fracture, rib fracture  -ENT recs outpatient therapy for audiometric testing  -PMR- recs acute rehabilitation   -DVT PPx  -Dispo Planning 66yo male s/p fall presents with TBI with SDH, SAH, Subcoritcal infarct on left, Temporal bone fracture, Clavicle fx, First rib fx, Facial fx - right zyogamtic arch fx, and Skull fx  -Hemodynamically stable  -transfer to floors from SICU overnight    PLan:   -Pain Control PRN  -Complete seven day course of keppra for seizure prophylaxis (last day 3/20)  -Continue thiamine, folate, MVI  -CIWA monitoring  -Home medications  -Plastic Surgery recs non operative for zygomatic fracture  -Ortho recs non operative for clavicle fracture, rib fracture  -ENT recs outpatient therapy for audiometric testing  -PMR- recs acute rehabilitation   -DVT PPx  -Dispo Planning

## 2018-03-18 LAB
ANION GAP SERPL CALC-SCNC: 14 MMOL/L — SIGNIFICANT CHANGE UP (ref 5–17)
BASOPHILS # BLD AUTO: 0 K/UL — SIGNIFICANT CHANGE UP (ref 0–0.2)
BASOPHILS NFR BLD AUTO: 0.1 % — SIGNIFICANT CHANGE UP (ref 0–2)
BUN SERPL-MCNC: 11 MG/DL — SIGNIFICANT CHANGE UP (ref 8–20)
CALCIUM SERPL-MCNC: 9.6 MG/DL — SIGNIFICANT CHANGE UP (ref 8.6–10.2)
CHLORIDE SERPL-SCNC: 93 MMOL/L — LOW (ref 98–107)
CO2 SERPL-SCNC: 26 MMOL/L — SIGNIFICANT CHANGE UP (ref 22–29)
CREAT SERPL-MCNC: 0.46 MG/DL — LOW (ref 0.5–1.3)
EOSINOPHIL # BLD AUTO: 0.1 K/UL — SIGNIFICANT CHANGE UP (ref 0–0.5)
EOSINOPHIL NFR BLD AUTO: 0.8 % — SIGNIFICANT CHANGE UP (ref 0–5)
GLUCOSE SERPL-MCNC: 120 MG/DL — HIGH (ref 70–115)
HCT VFR BLD CALC: 40.3 % — LOW (ref 42–52)
HGB BLD-MCNC: 13.8 G/DL — LOW (ref 14–18)
LYMPHOCYTES # BLD AUTO: 1.1 K/UL — SIGNIFICANT CHANGE UP (ref 1–4.8)
LYMPHOCYTES # BLD AUTO: 10.1 % — LOW (ref 20–55)
MAGNESIUM SERPL-MCNC: 2.1 MG/DL — SIGNIFICANT CHANGE UP (ref 1.6–2.6)
MCHC RBC-ENTMCNC: 29.6 PG — SIGNIFICANT CHANGE UP (ref 27–31)
MCHC RBC-ENTMCNC: 34.2 G/DL — SIGNIFICANT CHANGE UP (ref 32–36)
MCV RBC AUTO: 86.5 FL — SIGNIFICANT CHANGE UP (ref 80–94)
MONOCYTES # BLD AUTO: 1.1 K/UL — HIGH (ref 0–0.8)
MONOCYTES NFR BLD AUTO: 10.5 % — HIGH (ref 3–10)
NEUTROPHILS # BLD AUTO: 8.3 K/UL — HIGH (ref 1.8–8)
NEUTROPHILS NFR BLD AUTO: 78.1 % — HIGH (ref 37–73)
PHOSPHATE SERPL-MCNC: 3.7 MG/DL — SIGNIFICANT CHANGE UP (ref 2.4–4.7)
PLATELET # BLD AUTO: 224 K/UL — SIGNIFICANT CHANGE UP (ref 150–400)
POTASSIUM SERPL-MCNC: 3.7 MMOL/L — SIGNIFICANT CHANGE UP (ref 3.5–5.3)
POTASSIUM SERPL-SCNC: 3.7 MMOL/L — SIGNIFICANT CHANGE UP (ref 3.5–5.3)
RBC # BLD: 4.66 M/UL — SIGNIFICANT CHANGE UP (ref 4.6–6.2)
RBC # FLD: 12.9 % — SIGNIFICANT CHANGE UP (ref 11–15.6)
SODIUM SERPL-SCNC: 133 MMOL/L — LOW (ref 135–145)
WBC # BLD: 10.6 K/UL — SIGNIFICANT CHANGE UP (ref 4.8–10.8)
WBC # FLD AUTO: 10.6 K/UL — SIGNIFICANT CHANGE UP (ref 4.8–10.8)

## 2018-03-18 RX ORDER — LEVETIRACETAM 250 MG/1
1000 TABLET, FILM COATED ORAL
Qty: 0 | Refills: 0 | Status: DISCONTINUED | OUTPATIENT
Start: 2018-03-18 | End: 2018-03-20

## 2018-03-18 RX ORDER — LANOLIN ALCOHOL/MO/W.PET/CERES
5 CREAM (GRAM) TOPICAL AT BEDTIME
Qty: 0 | Refills: 0 | Status: DISCONTINUED | OUTPATIENT
Start: 2018-03-18 | End: 2018-03-20

## 2018-03-18 RX ORDER — LEVETIRACETAM 250 MG/1
1000 TABLET, FILM COATED ORAL EVERY 12 HOURS
Qty: 0 | Refills: 0 | Status: DISCONTINUED | OUTPATIENT
Start: 2018-03-18 | End: 2018-03-18

## 2018-03-18 RX ADMIN — Medication 100 MILLIGRAM(S): at 06:01

## 2018-03-18 RX ADMIN — Medication 400 MILLIGRAM(S): at 20:43

## 2018-03-18 RX ADMIN — Medication 100 MILLIGRAM(S): at 12:18

## 2018-03-18 RX ADMIN — Medication 400 MILLIGRAM(S): at 21:30

## 2018-03-18 RX ADMIN — Medication 5 MILLIGRAM(S): at 21:51

## 2018-03-18 RX ADMIN — Medication 200 MICROGRAM(S): at 06:01

## 2018-03-18 RX ADMIN — GABAPENTIN 300 MILLIGRAM(S): 400 CAPSULE ORAL at 14:47

## 2018-03-18 RX ADMIN — Medication 2 TABLET(S): at 03:38

## 2018-03-18 RX ADMIN — LEVETIRACETAM 400 MILLIGRAM(S): 250 TABLET, FILM COATED ORAL at 06:01

## 2018-03-18 RX ADMIN — Medication 1 TABLET(S): at 12:18

## 2018-03-18 RX ADMIN — Medication 2 TABLET(S): at 04:38

## 2018-03-18 RX ADMIN — Medication 1 TABLET(S): at 10:23

## 2018-03-18 RX ADMIN — ATORVASTATIN CALCIUM 40 MILLIGRAM(S): 80 TABLET, FILM COATED ORAL at 21:51

## 2018-03-18 RX ADMIN — LEVETIRACETAM 1000 MILLIGRAM(S): 250 TABLET, FILM COATED ORAL at 18:01

## 2018-03-18 RX ADMIN — Medication 1 MILLIGRAM(S): at 12:18

## 2018-03-18 RX ADMIN — ENOXAPARIN SODIUM 40 MILLIGRAM(S): 100 INJECTION SUBCUTANEOUS at 12:18

## 2018-03-18 RX ADMIN — GABAPENTIN 300 MILLIGRAM(S): 400 CAPSULE ORAL at 06:01

## 2018-03-18 RX ADMIN — AMLODIPINE BESYLATE 10 MILLIGRAM(S): 2.5 TABLET ORAL at 06:01

## 2018-03-18 RX ADMIN — Medication 25 MILLIGRAM(S): at 06:01

## 2018-03-18 RX ADMIN — Medication 2 TABLET(S): at 16:30

## 2018-03-18 RX ADMIN — Medication 2 TABLET(S): at 23:46

## 2018-03-18 RX ADMIN — Medication 2 TABLET(S): at 15:43

## 2018-03-18 RX ADMIN — Medication 1 TABLET(S): at 11:00

## 2018-03-18 RX ADMIN — GABAPENTIN 300 MILLIGRAM(S): 400 CAPSULE ORAL at 21:51

## 2018-03-18 NOTE — PROGRESS NOTE ADULT - SUBJECTIVE AND OBJECTIVE BOX
INTERVAL HPI/OVERNIGHT EVENTS:  Patient was seen and examined at bedside this AM. Patient still with complaints of headache. Pain moderately controlled with tylenol or fiorcet. Patient to go to acute rehab and patient's family requesting he be transferred to [facility].  on the case.           MEDICATIONS  (STANDING):  amLODIPine   Tablet 10 milliGRAM(s) Oral daily  atorvastatin 40 milliGRAM(s) Oral at bedtime  docusate sodium 100 milliGRAM(s) Oral three times a day  enoxaparin Injectable 40 milliGRAM(s) SubCutaneous daily  folic acid 1 milliGRAM(s) Oral daily  gabapentin 300 milliGRAM(s) Oral three times a day  hydrochlorothiazide 25 milliGRAM(s) Oral daily  levETIRAcetam  IVPB 1000 milliGRAM(s) IV Intermittent every 12 hours  levothyroxine 200 MICROGram(s) Oral daily  melatonin 3 milliGRAM(s) Oral at bedtime  multivitamin 1 Tablet(s) Oral daily  senna 2 Tablet(s) Oral at bedtime  thiamine 100 milliGRAM(s) Oral daily    MEDICATIONS  (PRN):  acetaminophen 325 mG/butalbital 50 mG/caffeine 40 mG 1 Tablet(s) Oral every 6 hours PRN moderate headache  acetaminophen 325 mG/butalbital 50 mG/caffeine 40 mG 2 Tablet(s) Oral every 6 hours PRN severe headache  ibuprofen  Tablet 400 milliGRAM(s) Oral every 4 hours PRN pain      Vital Signs Last 24 Hrs  T(C): 36.8 (18 Mar 2018 08:10), Max: 37.3 (17 Mar 2018 16:00)  T(F): 98.2 (18 Mar 2018 08:10), Max: 99.2 (17 Mar 2018 16:00)  HR: 72 (18 Mar 2018 08:10) (66 - 75)  BP: 155/91 (18 Mar 2018 08:10) (150/77 - 168/84)  BP(mean): --  RR: 18 (18 Mar 2018 08:10) (16 - 18)  SpO2: 100% (18 Mar 2018 08:10) (94% - 100%)    Physical Exam:  Gen: NAD  Neurological:  No sensory/motor deficits  HEENT: PERRLA, EOMI  Respiratory: Breath Sounds equal & CTA bilaterally, no accessory muscle use  Cardiovascular: Regular rate & rhythm, normal S1, S2; no murmurs, gallops or rubs  Gastrointestinal: Soft, non-tender, nondistended  Vascular: Equal and normal pulses: 2+ peripheral pulses throughout  Musculoskeletal: No joint pain, swelling or deformity; no limitation of movement  Skin: No rashes      I&O's Detail    17 Mar 2018 07:01  -  18 Mar 2018 07:00  --------------------------------------------------------  IN:    Oral Fluid: 860 mL    Solution: 50 mL  Total IN: 910 mL    OUT:    Voided: 2150 mL  Total OUT: 2150 mL    Total NET: -1240 mL      18 Mar 2018 07:01  -  18 Mar 2018 10:11  --------------------------------------------------------  IN:    Oral Fluid: 320 mL  Total IN: 320 mL    OUT:  Total OUT: 0 mL    Total NET: 320 mL          LABS:                        13.8   10.6  )-----------( 224      ( 18 Mar 2018 06:13 )             40.3     03-18    133<L>  |  93<L>  |  11.0  ----------------------------<  120<H>  3.7   |  26.0  |  0.46<L>    Ca    9.6      18 Mar 2018 06:13  Phos  3.7     03-18  Mg     2.1     03-18            RADIOLOGY & ADDITIONAL STUDIES:

## 2018-03-18 NOTE — PROGRESS NOTE ADULT - ASSESSMENT
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

## 2018-03-19 PROCEDURE — 99232 SBSQ HOSP IP/OBS MODERATE 35: CPT

## 2018-03-19 RX ORDER — ACETAMINOPHEN 500 MG
1000 TABLET ORAL ONCE
Qty: 0 | Refills: 0 | Status: COMPLETED | OUTPATIENT
Start: 2018-03-19 | End: 2018-03-19

## 2018-03-19 RX ADMIN — LEVETIRACETAM 1000 MILLIGRAM(S): 250 TABLET, FILM COATED ORAL at 18:40

## 2018-03-19 RX ADMIN — AMLODIPINE BESYLATE 10 MILLIGRAM(S): 2.5 TABLET ORAL at 06:04

## 2018-03-19 RX ADMIN — Medication 400 MILLIGRAM(S): at 21:46

## 2018-03-19 RX ADMIN — Medication 1000 MILLIGRAM(S): at 21:00

## 2018-03-19 RX ADMIN — Medication 2 TABLET(S): at 23:30

## 2018-03-19 RX ADMIN — LEVETIRACETAM 1000 MILLIGRAM(S): 250 TABLET, FILM COATED ORAL at 06:04

## 2018-03-19 RX ADMIN — Medication 2 TABLET(S): at 11:51

## 2018-03-19 RX ADMIN — Medication 2 TABLET(S): at 12:55

## 2018-03-19 RX ADMIN — Medication 400 MILLIGRAM(S): at 17:30

## 2018-03-19 RX ADMIN — Medication 1 TABLET(S): at 11:42

## 2018-03-19 RX ADMIN — Medication 400 MILLIGRAM(S): at 06:49

## 2018-03-19 RX ADMIN — Medication 1 MILLIGRAM(S): at 11:42

## 2018-03-19 RX ADMIN — Medication 400 MILLIGRAM(S): at 20:30

## 2018-03-19 RX ADMIN — GABAPENTIN 300 MILLIGRAM(S): 400 CAPSULE ORAL at 06:04

## 2018-03-19 RX ADMIN — Medication 2 TABLET(S): at 00:30

## 2018-03-19 RX ADMIN — GABAPENTIN 300 MILLIGRAM(S): 400 CAPSULE ORAL at 13:17

## 2018-03-19 RX ADMIN — Medication 400 MILLIGRAM(S): at 22:45

## 2018-03-19 RX ADMIN — Medication 400 MILLIGRAM(S): at 06:05

## 2018-03-19 RX ADMIN — Medication 100 MILLIGRAM(S): at 13:17

## 2018-03-19 RX ADMIN — ATORVASTATIN CALCIUM 40 MILLIGRAM(S): 80 TABLET, FILM COATED ORAL at 21:45

## 2018-03-19 RX ADMIN — Medication 100 MILLIGRAM(S): at 11:42

## 2018-03-19 RX ADMIN — Medication 5 MILLIGRAM(S): at 21:47

## 2018-03-19 RX ADMIN — Medication 2 TABLET(S): at 22:30

## 2018-03-19 RX ADMIN — GABAPENTIN 300 MILLIGRAM(S): 400 CAPSULE ORAL at 21:46

## 2018-03-19 RX ADMIN — ENOXAPARIN SODIUM 40 MILLIGRAM(S): 100 INJECTION SUBCUTANEOUS at 11:43

## 2018-03-19 RX ADMIN — Medication 200 MICROGRAM(S): at 06:04

## 2018-03-19 RX ADMIN — Medication 25 MILLIGRAM(S): at 06:04

## 2018-03-19 RX ADMIN — Medication 400 MILLIGRAM(S): at 16:49

## 2018-03-19 NOTE — PROGRESS NOTE ADULT - SUBJECTIVE AND OBJECTIVE BOX
INTERVAL HPI/OVERNIGHT EVENTS: No acute events overnight.    SUBJECTIVE: Patient becoming more alert and interactive, per family, GCS 15, AOx3. No fever, chills, chest pain, dyspnea. Working with PT/OT. Awaiting placement in acute rehab. Tolerating diet, no n/v/d.       MEDICATIONS  (STANDING):  amLODIPine   Tablet 10 milliGRAM(s) Oral daily  atorvastatin 40 milliGRAM(s) Oral at bedtime  docusate sodium 100 milliGRAM(s) Oral three times a day  enoxaparin Injectable 40 milliGRAM(s) SubCutaneous daily  folic acid 1 milliGRAM(s) Oral daily  gabapentin 300 milliGRAM(s) Oral three times a day  hydrochlorothiazide 25 milliGRAM(s) Oral daily  levETIRAcetam 1000 milliGRAM(s) Oral two times a day  levothyroxine 200 MICROGram(s) Oral daily  melatonin 5 milliGRAM(s) Oral at bedtime  multivitamin 1 Tablet(s) Oral daily  senna 2 Tablet(s) Oral at bedtime  thiamine 100 milliGRAM(s) Oral daily    MEDICATIONS  (PRN):  acetaminophen 325 mG/butalbital 50 mG/caffeine 40 mG 2 Tablet(s) Oral every 8 hours PRN headache  ibuprofen  Tablet 400 milliGRAM(s) Oral every 4 hours PRN pain      Vital Signs Last 24 Hrs  T(C): 36.7 (19 Mar 2018 07:25), Max: 36.8 (18 Mar 2018 16:19)  T(F): 98 (19 Mar 2018 07:25), Max: 98.2 (18 Mar 2018 16:19)  HR: 64 (19 Mar 2018 07:25) (64 - 82)  BP: 117/71 (19 Mar 2018 07:25) (117/71 - 149/79)  BP(mean): --  RR: 18 (19 Mar 2018 07:25) (16 - 18)  SpO2: 95% (19 Mar 2018 07:25) (95% - 98%)    Physical exam:  General: NAD, AOx3, resting comfortably in bed  HEENT: PERRLA, EOMI  Neck: supple, nontender  Respiratory: no respiratory distress, lungs CTAB  Heart: regular rate and rhythm, no murmurs  Abdomen: soft, nontender, nondistended. Normal bowel sounds. No guarding or rebound.  Extremities: no peripheral edema. Normal ROM.      I&O's Detail    18 Mar 2018 07:01  -  19 Mar 2018 07:00  --------------------------------------------------------  IN:    Oral Fluid: 880 mL  Total IN: 880 mL    OUT:    Voided: 150 mL  Total OUT: 150 mL    Total NET: 730 mL          LABS:                        13.8   10.6  )-----------( 224      ( 18 Mar 2018 06:13 )             40.3     03-18    133<L>  |  93<L>  |  11.0  ----------------------------<  120<H>  3.7   |  26.0  |  0.46<L>    Ca    9.6      18 Mar 2018 06:13  Phos  3.7     03-18  Mg     2.1     03-18

## 2018-03-19 NOTE — PROGRESS NOTE ADULT - SUBJECTIVE AND OBJECTIVE BOX
Patient in bed, daughter and wife at bedside.   Patient feels well. Having headaches, located along his forehead and behind his eyes. Responds to Motrin  Patient is making progress in rehab, as he is with his motor function.     FUNCTIONAL PROGRESS  3/17  Bed Mobility  Bed Mobility Training Supine-to-Sit: maximum assist (25% patient effort);  2 person assist  Bed Mobility Training Limitations: decreased strength;  impaired postural control    Sit-Stand Transfer Training  Transfer Training Sit-to-Stand Transfer: dependent (less than 25% patient effort);  2 person assist;  nonweight-bearing   right UE NWB   Transfer Training Stand-to-Sit Transfer: dependent (less than 25% patient effort);  2 person assist;  nonweight-bearing   right UE NWB   Sit-to-Stand Transfer Training Transfer Safety Analysis: decreased strength;  impaired motor control;  right LE has no motor control     Gait Training  Gait Training: dependent (less than 25% patient effort);  2 person assist;  nonweight-bearing   right UE NWB    bed to chair  Gait Analysis: decreased step length;  decreased stride length;  decreased strength;  impaired motor control;  Right LE has no motor control     REVIEW OF SYSTEMS  Constitutional - No fever,  No fatigue  HEENT - No vertigo, No neck pain  Neurological - +headaches, No memory loss, +loss of strength    VITALS  T(C): 36.7 (03-19-18 @ 07:25), Max: 36.8 (03-18-18 @ 16:19)  HR: 64 (03-19-18 @ 07:25) (64 - 82)  BP: 117/71 (03-19-18 @ 07:25) (117/71 - 149/79)  RR: 18 (03-19-18 @ 07:25) (16 - 18)  SpO2: 95% (03-19-18 @ 07:25) (95% - 98%)  Wt(kg): --    MEDICATIONS   acetaminophen 325 mG/butalbital 50 mG/caffeine 40 mG 2 Tablet(s) every 8 hours PRN  amLODIPine   Tablet 10 milliGRAM(s) daily  atorvastatin 40 milliGRAM(s) at bedtime  docusate sodium 100 milliGRAM(s) three times a day  enoxaparin Injectable 40 milliGRAM(s) daily  folic acid 1 milliGRAM(s) daily  gabapentin 300 milliGRAM(s) three times a day  hydrochlorothiazide 25 milliGRAM(s) daily  ibuprofen  Tablet 400 milliGRAM(s) every 4 hours PRN  levETIRAcetam 1000 milliGRAM(s) two times a day  levothyroxine 200 MICROGram(s) daily  melatonin 5 milliGRAM(s) at bedtime  multivitamin 1 Tablet(s) daily  senna 2 Tablet(s) at bedtime  thiamine 100 milliGRAM(s) daily      RECENT LABS/IMAGING  CBC Full  -  ( 18 Mar 2018 06:13 )  WBC Count : 10.6 K/uL  Hemoglobin : 13.8 g/dL  Hematocrit : 40.3 %  Platelet Count - Automated : 224 K/uL  Mean Cell Volume : 86.5 fl  Mean Cell Hemoglobin : 29.6 pg  Mean Cell Hemoglobin Concentration : 34.2 g/dL  Auto Neutrophil # : 8.3 K/uL  Auto Lymphocyte # : 1.1 K/uL  Auto Monocyte # : 1.1 K/uL  Auto Eosinophil # : 0.1 K/uL  Auto Basophil # : 0.0 K/uL  Auto Neutrophil % : 78.1 %  Auto Lymphocyte % : 10.1 %  Auto Monocyte % : 10.5 %  Auto Eosinophil % : 0.8 %  Auto Basophil % : 0.1 %    03-18    133<L>  |  93<L>  |  11.0  ----------------------------<  120<H>  3.7   |  26.0  |  0.46<L>    Ca    9.6      18 Mar 2018 06:13  Phos  3.7     03-18  Mg     2.1     03-18      ------------------------------------------------------------------------------  PHYSICAL EXAM  Constitutional - NAD, Comfortable  Extremities - Mild right hand swelling  Neurologic Exam -                    Cognitive - Awake, Alert, AAO to self, hospital, part of injury/sitaution     Communication - continues to improve - states worse instead of better, naming difficulties     Motor -                     RIGHT UE - ShAB -/5, EF 2/5, EE 2/5,  4/5                    RIGHT LE - HE 1/5, KE 1/5, DF 0/5, PF 0/5      Psychiatric - Affect flat, mood more positive  ----------------------------------------------------------------------------------------  ASSESSMENT/PLAN  67yMale with functional deficits after sustaining a TBI from a fall and with CVA   Pain - Neurontin, Tylenol  Headaches - Fioricet, consider adding ice/heat to areas of soarness  EtOH - Ativan, Folate, Thiamine  Seizure PPX - Keppra  Mood/Impulsivity - Likely related to confusion, complicated by aphasia. Consider adding depakote.   Sleep - Melatonin  DVT PPX - SCDs, Lovenox  Rehab - Continue to recommend ACUTE inpatient rehabilitation for the functional deficits consisting of 3 hours of therapy/day & 24 hour RN/daily PMR physician for comorbid medical management. Will continue to follow for ongoing rehab needs and recommendations.

## 2018-03-19 NOTE — PROGRESS NOTE ADULT - ASSESSMENT
68 y/o M with TBI, SDH, SAH, L subcortical infarct, temporal bone fracture, R clavicle fx, R first rib fx, R facial fx, R zygomatic arch fx s/p fall. Patient is hemodynamically stable. Working with physical therapy, occupational therapy.    Plan:  -Continue pain control as needed  -Continue regular diet  -Continue Keppra until 3/20, seizure ppx  -CIWA monitoring; continue thiamine, folate, MVI  -Continue home medications  -Plastics: non-operative facial fractures  -Ortho: non-operative clavicle fracture, 1st rib fracture  -ENT: outpatient therapy for audiometric testing  -DVT ppx  -Dispo: PMR recommending acute rehab, case management working on placement

## 2018-03-20 VITALS
OXYGEN SATURATION: 97 % | SYSTOLIC BLOOD PRESSURE: 120 MMHG | RESPIRATION RATE: 18 BRPM | TEMPERATURE: 98 F | HEART RATE: 76 BPM | DIASTOLIC BLOOD PRESSURE: 65 MMHG

## 2018-03-20 PROCEDURE — 80053 COMPREHEN METABOLIC PANEL: CPT

## 2018-03-20 PROCEDURE — 83605 ASSAY OF LACTIC ACID: CPT

## 2018-03-20 PROCEDURE — 80307 DRUG TEST PRSMV CHEM ANLYZR: CPT

## 2018-03-20 PROCEDURE — 72141 MRI NECK SPINE W/O DYE: CPT

## 2018-03-20 PROCEDURE — 97163 PT EVAL HIGH COMPLEX 45 MIN: CPT

## 2018-03-20 PROCEDURE — 70547 MR ANGIOGRAPHY NECK W/O DYE: CPT

## 2018-03-20 PROCEDURE — 82550 ASSAY OF CK (CPK): CPT

## 2018-03-20 PROCEDURE — 70544 MR ANGIOGRAPHY HEAD W/O DYE: CPT

## 2018-03-20 PROCEDURE — 80048 BASIC METABOLIC PNL TOTAL CA: CPT

## 2018-03-20 PROCEDURE — 36415 COLL VENOUS BLD VENIPUNCTURE: CPT

## 2018-03-20 PROCEDURE — 84100 ASSAY OF PHOSPHORUS: CPT

## 2018-03-20 PROCEDURE — 85730 THROMBOPLASTIN TIME PARTIAL: CPT

## 2018-03-20 PROCEDURE — 93306 TTE W/DOPPLER COMPLETE: CPT

## 2018-03-20 PROCEDURE — 93880 EXTRACRANIAL BILAT STUDY: CPT

## 2018-03-20 PROCEDURE — 97110 THERAPEUTIC EXERCISES: CPT

## 2018-03-20 PROCEDURE — G0390: CPT

## 2018-03-20 PROCEDURE — 83735 ASSAY OF MAGNESIUM: CPT

## 2018-03-20 PROCEDURE — 86901 BLOOD TYPING SEROLOGIC RH(D): CPT

## 2018-03-20 PROCEDURE — 97530 THERAPEUTIC ACTIVITIES: CPT

## 2018-03-20 PROCEDURE — 93005 ELECTROCARDIOGRAM TRACING: CPT

## 2018-03-20 PROCEDURE — 84443 ASSAY THYROID STIM HORMONE: CPT

## 2018-03-20 PROCEDURE — 70486 CT MAXILLOFACIAL W/O DYE: CPT

## 2018-03-20 PROCEDURE — 80061 LIPID PANEL: CPT

## 2018-03-20 PROCEDURE — 92507 TX SP LANG VOICE COMM INDIV: CPT

## 2018-03-20 PROCEDURE — 85027 COMPLETE CBC AUTOMATED: CPT

## 2018-03-20 PROCEDURE — 92526 ORAL FUNCTION THERAPY: CPT

## 2018-03-20 PROCEDURE — 86850 RBC ANTIBODY SCREEN: CPT

## 2018-03-20 PROCEDURE — 99291 CRITICAL CARE FIRST HOUR: CPT | Mod: 25

## 2018-03-20 PROCEDURE — 92523 SPEECH SOUND LANG COMPREHEN: CPT

## 2018-03-20 PROCEDURE — 73030 X-RAY EXAM OF SHOULDER: CPT

## 2018-03-20 PROCEDURE — 86900 BLOOD TYPING SEROLOGIC ABO: CPT

## 2018-03-20 PROCEDURE — 83930 ASSAY OF BLOOD OSMOLALITY: CPT

## 2018-03-20 PROCEDURE — 97116 GAIT TRAINING THERAPY: CPT

## 2018-03-20 PROCEDURE — 90471 IMMUNIZATION ADMIN: CPT

## 2018-03-20 PROCEDURE — 83036 HEMOGLOBIN GLYCOSYLATED A1C: CPT

## 2018-03-20 PROCEDURE — 85610 PROTHROMBIN TIME: CPT

## 2018-03-20 PROCEDURE — 70551 MRI BRAIN STEM W/O DYE: CPT

## 2018-03-20 PROCEDURE — 70450 CT HEAD/BRAIN W/O DYE: CPT

## 2018-03-20 PROCEDURE — 82553 CREATINE MB FRACTION: CPT

## 2018-03-20 PROCEDURE — 92610 EVALUATE SWALLOWING FUNCTION: CPT

## 2018-03-20 PROCEDURE — 99232 SBSQ HOSP IP/OBS MODERATE 35: CPT

## 2018-03-20 RX ORDER — THIAMINE MONONITRATE (VIT B1) 100 MG
1 TABLET ORAL
Qty: 0 | Refills: 0 | DISCHARGE
Start: 2018-03-20

## 2018-03-20 RX ORDER — AMLODIPINE BESYLATE 2.5 MG/1
1 TABLET ORAL
Qty: 0 | Refills: 0 | DISCHARGE
Start: 2018-03-20

## 2018-03-20 RX ORDER — GABAPENTIN 400 MG/1
1 CAPSULE ORAL
Qty: 0 | Refills: 0 | DISCHARGE
Start: 2018-03-20

## 2018-03-20 RX ORDER — ACETAMINOPHEN 500 MG
750 TABLET ORAL ONCE
Qty: 0 | Refills: 0 | Status: DISCONTINUED | OUTPATIENT
Start: 2018-03-20 | End: 2018-03-20

## 2018-03-20 RX ORDER — ATORVASTATIN CALCIUM 80 MG/1
1 TABLET, FILM COATED ORAL
Qty: 0 | Refills: 0 | DISCHARGE
Start: 2018-03-20

## 2018-03-20 RX ORDER — LEVOTHYROXINE SODIUM 125 MCG
1 TABLET ORAL
Qty: 0 | Refills: 0 | DISCHARGE
Start: 2018-03-20

## 2018-03-20 RX ORDER — ENOXAPARIN SODIUM 100 MG/ML
40 INJECTION SUBCUTANEOUS
Qty: 0 | Refills: 0 | DISCHARGE
Start: 2018-03-20

## 2018-03-20 RX ORDER — SENNA PLUS 8.6 MG/1
2 TABLET ORAL
Qty: 0 | Refills: 0 | DISCHARGE
Start: 2018-03-20

## 2018-03-20 RX ORDER — LEVETIRACETAM 250 MG/1
1 TABLET, FILM COATED ORAL
Qty: 0 | Refills: 0 | COMMUNITY
Start: 2018-03-20

## 2018-03-20 RX ORDER — FOLIC ACID 0.8 MG
1 TABLET ORAL
Qty: 0 | Refills: 0 | DISCHARGE
Start: 2018-03-20

## 2018-03-20 RX ORDER — IBUPROFEN 200 MG
1 TABLET ORAL
Qty: 0 | Refills: 0 | DISCHARGE
Start: 2018-03-20

## 2018-03-20 RX ORDER — DOCUSATE SODIUM 100 MG
1 CAPSULE ORAL
Qty: 0 | Refills: 0 | DISCHARGE
Start: 2018-03-20

## 2018-03-20 RX ORDER — LANOLIN ALCOHOL/MO/W.PET/CERES
1 CREAM (GRAM) TOPICAL
Qty: 0 | Refills: 0 | DISCHARGE
Start: 2018-03-20

## 2018-03-20 RX ADMIN — Medication 2 TABLET(S): at 13:56

## 2018-03-20 RX ADMIN — Medication 1 TABLET(S): at 12:21

## 2018-03-20 RX ADMIN — Medication 2 TABLET(S): at 07:30

## 2018-03-20 RX ADMIN — Medication 25 MILLIGRAM(S): at 05:56

## 2018-03-20 RX ADMIN — Medication 400 MILLIGRAM(S): at 12:04

## 2018-03-20 RX ADMIN — AMLODIPINE BESYLATE 10 MILLIGRAM(S): 2.5 TABLET ORAL at 05:55

## 2018-03-20 RX ADMIN — GABAPENTIN 300 MILLIGRAM(S): 400 CAPSULE ORAL at 13:56

## 2018-03-20 RX ADMIN — Medication 400 MILLIGRAM(S): at 04:30

## 2018-03-20 RX ADMIN — ENOXAPARIN SODIUM 40 MILLIGRAM(S): 100 INJECTION SUBCUTANEOUS at 12:06

## 2018-03-20 RX ADMIN — Medication 1 MILLIGRAM(S): at 12:06

## 2018-03-20 RX ADMIN — Medication 2 TABLET(S): at 06:50

## 2018-03-20 RX ADMIN — LEVETIRACETAM 1000 MILLIGRAM(S): 250 TABLET, FILM COATED ORAL at 05:55

## 2018-03-20 RX ADMIN — Medication 400 MILLIGRAM(S): at 05:30

## 2018-03-20 RX ADMIN — Medication 100 MILLIGRAM(S): at 12:06

## 2018-03-20 RX ADMIN — Medication 200 MICROGRAM(S): at 05:55

## 2018-03-20 RX ADMIN — GABAPENTIN 300 MILLIGRAM(S): 400 CAPSULE ORAL at 05:55

## 2018-03-20 NOTE — PROGRESS NOTE ADULT - PROVIDER SPECIALTY LIST ADULT
ENT
Neurology
Neurosurgery
Orthopedics
Rehab Medicine
SICU
Surgery
Surgery
Trauma Surgery
Trauma Surgery
SICU
Neurosurgery

## 2018-03-20 NOTE — PROGRESS NOTE ADULT - SUBJECTIVE AND OBJECTIVE BOX
INTERVAL HPI/OVERNIGHT EVENTS:  Patient doing well this AM, no acute events overnight. Accepted for at [facility] for acute rehab yesterday, awaiting a bed. GCS 15, AAOx3. Tolerating diet, working with PT, voiding, having bowel function. Headache adequately controlled with current regimen. Denies fever, chills, nausea, vomiting, diarrhea, constipation.      MEDICATIONS  (STANDING):  amLODIPine   Tablet 10 milliGRAM(s) Oral daily  atorvastatin 40 milliGRAM(s) Oral at bedtime  docusate sodium 100 milliGRAM(s) Oral three times a day  enoxaparin Injectable 40 milliGRAM(s) SubCutaneous daily  folic acid 1 milliGRAM(s) Oral daily  gabapentin 300 milliGRAM(s) Oral three times a day  hydrochlorothiazide 25 milliGRAM(s) Oral daily  levETIRAcetam 1000 milliGRAM(s) Oral two times a day  levothyroxine 200 MICROGram(s) Oral daily  melatonin 5 milliGRAM(s) Oral at bedtime  multivitamin 1 Tablet(s) Oral daily  senna 2 Tablet(s) Oral at bedtime  thiamine 100 milliGRAM(s) Oral daily    MEDICATIONS  (PRN):  acetaminophen 325 mG/butalbital 50 mG/caffeine 40 mG 2 Tablet(s) Oral every 8 hours PRN headache  ibuprofen  Tablet 400 milliGRAM(s) Oral every 4 hours PRN pain      Vital Signs Last 24 Hrs  T(C): 36.8 (20 Mar 2018 08:08), Max: 37.8 (19 Mar 2018 16:52)  T(F): 98.2 (20 Mar 2018 08:08), Max: 100.1 (19 Mar 2018 16:52)  HR: 76 (20 Mar 2018 08:08) (67 - 82)  BP: 120/65 (20 Mar 2018 08:08) (120/65 - 133/78)  BP(mean): --  RR: 18 (20 Mar 2018 08:08) (18 - 18)  SpO2: 97% (20 Mar 2018 08:08) (95% - 97%)    Physical Exam:  Gen: NAD  Neurological:  No sensory/motor deficits  HEENT: PERRLA, EOMI  Respiratory: Breath Sounds equal & CTA bilaterally, no accessory muscle use  Cardiovascular: Regular rate & rhythm, normal S1, S2; no murmurs, gallops or rubs  Gastrointestinal: Soft, non-tender, nondistended  Vascular: Equal and normal pulses: 2+ peripheral pulses throughout  Musculoskeletal: No joint pain, swelling or deformity; no limitation of movement  Skin: No rashes      I&O's Detail    19 Mar 2018 07:01  -  20 Mar 2018 07:00  --------------------------------------------------------  IN:  Total IN: 0 mL    OUT:    Voided: 950 mL  Total OUT: 950 mL    Total NET: -950 mL          LABS:                RADIOLOGY & ADDITIONAL STUDIES:

## 2018-03-20 NOTE — PROGRESS NOTE ADULT - SUBJECTIVE AND OBJECTIVE BOX
Patient in bed. Wife at bedside doing well.  Reports no pain at this time.  Planned for DC to [facility] today at 3PM.   Expressive deficits noted.     FUNCTIONAL PROGRESS  3/19  Sit-Stand Transfer Training  Transfer Training Sit-to-Stand Transfer: maximum assist (25% patient effort);  2 person assist  Transfer Training Stand-to-Sit Transfer: maximum assist (25% patient effort);  2 person assist    Gait Training  Gait Training: maximum assist (25% patient effort);  2 person assist;  2 steps to chair, NWB right UE for all mobility  Gait Analysis: unsteadiness throughout task, decreased gait velocity and activity tolerance, right knee buckling, verbal cues for sequencing    REVIEW OF SYSTEMS  Constitutional - No fever,  No fatigue  HEENT - No vertigo, No neck pain  Neurological - No headaches, No memory loss, +loss of strength     VITALS  T(C): 36.8 (03-20-18 @ 08:08), Max: 37.8 (03-19-18 @ 16:52)  HR: 76 (03-20-18 @ 08:08) (67 - 82)  BP: 120/65 (03-20-18 @ 08:08) (120/65 - 133/78)  RR: 18 (03-20-18 @ 08:08) (18 - 18)  SpO2: 97% (03-20-18 @ 08:08) (95% - 97%)  Wt(kg): --    MEDICATIONS   acetaminophen 325 mG/butalbital 50 mG/caffeine 40 mG 2 Tablet(s) every 8 hours PRN  amLODIPine   Tablet 10 milliGRAM(s) daily  atorvastatin 40 milliGRAM(s) at bedtime  docusate sodium 100 milliGRAM(s) three times a day  enoxaparin Injectable 40 milliGRAM(s) daily  folic acid 1 milliGRAM(s) daily  gabapentin 300 milliGRAM(s) three times a day  hydrochlorothiazide 25 milliGRAM(s) daily  ibuprofen  Tablet 400 milliGRAM(s) every 4 hours PRN  levothyroxine 200 MICROGram(s) daily  melatonin 5 milliGRAM(s) at bedtime  multivitamin 1 Tablet(s) daily  senna 2 Tablet(s) at bedtime  thiamine 100 milliGRAM(s) daily      ----------------------------------------------------------------------  PHYSICAL EXAM  Constitutional - NAD, Comfortable  Extremities - Mild right hand swelling  Neurologic Exam -                    Cognitive - Awake, Alert, AAO to self, hospital, part of injury/situation     Communication - Expressive deficits - improved     Motor -                     RIGHT UE - ShAB -/5, EF 1/5, EE 1/5,  4/5                    RIGHT LE - HE 2/5, KE 2/5, DF 1/5, PF 1/5      Psychiatric - Affect flat, mood more positive  ----------------------------------------------------------------------------------------  ASSESSMENT/PLAN  67yMale with functional deficits after sustaining a TBI from a fall and with CVA   Pain - Neurontin, Tylenol, Motrin  Headaches - Fioricet, consider adding ice/heat to areas of soarness  EtOH - Ativan, Folate, Thiamine  Seizure PPX - Keppra  Mood/Impulsivity - Recommend Prozac to assist with neuroplasticity  Sleep - Melatonin  DVT PPX - SCDs, Lovenox  Rehab - Continue to recommend ACUTE inpatient rehabilitation for the functional deficits consisting of 3 hours of therapy/day & 24 hour RN/daily PMR physician for comorbid medical management. Will continue to follow for ongoing rehab needs and recommendations.

## 2018-03-20 NOTE — PROGRESS NOTE ADULT - ATTENDING COMMENTS
NSGY Attg:    see above  patient improved neurologically  more alert; more appropriately conversant  able to wiggle fingers on R  + commands briskly with left    A/P:  cont supportive care  sz ppx  agree with neurology consult for acute infarct   will follow
NSGY Attg:    see above  right-sided weakness improving    agree with plan as documented above  will follow
The patient was seen and examined  No new problems  He is tolerating his diet  Participating with PT  OT  TBI rehab  Awaiting a rehab destination  DVT prophylaxis
The patient was seen and examined  No new problems  Seems less dysarthric today  Discharge to acute rehabilitation today
Agree with above assessment.  The patient reports overall he is feeling comfortable aside from intermittent pain along the left face and left periorbital area.  He has no chest pain, no SOB, and no abdominal pain.  The patient at this stage is stable for discharge planning. Case discussed with family at the bedside and they are trying to arrange for placement to rehab at [facility] in [place].  Continue pain control, PT, and discharge planning.
NSGY Attg:    see above  dispo/rehab pending  patient can f/u as outpatient in 2 weeks  will follow

## 2018-03-20 NOTE — PROGRESS NOTE ADULT - ASSESSMENT
68 y/o M with TBI, SDH, SAH, L subcortical infarct, temporal bone fracture, R clavicle fx, R first rib fx, R facial fx, R zygomatic arch fx s/p fall. Patient is hemodynamically stable. Accepted to acute rehab at [place], awaiting bed.  -Continue pain control as needed  -Continue regular diet  -Continue Keppra until 3/20, seizure ppx  -CIWA monitoring; continue thiamine, folate, MVI  -Continue home medications  -Plastics: non-operative facial fractures  -Ortho: non-operative clavicle fracture, 1st rib fracture  -ENT: outpatient therapy for audiometric testing  -DVT ppx  -TBI rehab  -Dispo: Acute rehab at [place]

## 2019-02-13 NOTE — H&P ADULT - CONSTITUTIONAL
Well-developed, well nourished S Plasty Text: Given the location and shape of the defect, and the orientation of relaxed skin tension lines, an S-plasty was deemed most appropriate for repair.  Using a sterile surgical marker, the appropriate outline of the S-plasty was drawn, incorporating the defect and placing the expected incisions within the relaxed skin tension lines where possible.  The area thus outlined was incised deep to adipose tissue with a #15 scalpel blade.  The skin margins were undermined to an appropriate distance in all directions utilizing iris scissors. The skin flaps were advanced over the defect.  The opposing margins were then approximated with interrupted buried subcutaneous sutures.

## 2019-09-27 NOTE — PHYSICAL THERAPY INITIAL EVALUATION ADULT - REFERRAL TO ANOTHER SERVICE NEEDED, PT EVAL
History     Chief Complaint   Patient presents with     Suicide Attempt     HPI  Bennett K Blumberg is a 19 year old male who presents after an intentional acetaminophen overdose.  Patient states that he took 25 500 mg acetaminophen in a suicide attempt.  This occurred between 0100 and 0145 this morning.  Patient then called EMS.  He denies any other coingestions.  He denies any drug or alcohol use.  He does have some nausea, no other symptoms noted.     I have reviewed the Medications, Allergies, Past Medical and Surgical History, and Social History in the Epic system.    Review of Systems   Constitutional: Negative for fever.   HENT: Negative for congestion.    Eyes: Negative for redness.   Respiratory: Negative for shortness of breath.    Cardiovascular: Negative for chest pain.   Gastrointestinal: Positive for nausea. Negative for abdominal pain.   Genitourinary: Negative for difficulty urinating.   Musculoskeletal: Negative for arthralgias and neck stiffness.   Skin: Negative for color change.   Neurological: Negative for headaches.   Psychiatric/Behavioral: Positive for suicidal ideas. Negative for confusion.       Physical Exam   BP: 134/85  Pulse: 65  Heart Rate: 65  Temp: 98.1  F (36.7  C)  Resp: 18  Weight: 71.5 kg (157 lb 9.6 oz)  SpO2: 100 %      Physical Exam  Constitutional:       General: He is not in acute distress.     Appearance: He is well-developed. He is not diaphoretic.   HENT:      Head: Normocephalic and atraumatic.      Mouth/Throat:      Pharynx: No oropharyngeal exudate.   Eyes:      General: No scleral icterus.        Right eye: No discharge.         Left eye: No discharge.      Pupils: Pupils are equal, round, and reactive to light.   Neck:      Musculoskeletal: Normal range of motion and neck supple.   Cardiovascular:      Rate and Rhythm: Normal rate and regular rhythm.      Heart sounds: Normal heart sounds. No murmur. No friction rub. No gallop.    Pulmonary:      Effort: Pulmonary  effort is normal. No respiratory distress.      Breath sounds: Normal breath sounds. No wheezing.   Chest:      Chest wall: No tenderness.   Abdominal:      General: Bowel sounds are normal. There is no distension.      Palpations: Abdomen is soft.      Tenderness: There is no tenderness.   Musculoskeletal: Normal range of motion.         General: No tenderness or deformity.   Skin:     General: Skin is warm and dry.      Coloration: Skin is not pale.      Findings: No erythema or rash.   Neurological:      Mental Status: He is alert and oriented to person, place, and time.      Cranial Nerves: No cranial nerve deficit.   Psychiatric:         Behavior: Behavior normal.         ED Course     ED Course as of Sep 27 0640   Fri Sep 27, 2019   0538 Anion Gap: 4     Procedures             Critical Care time:  none             Labs Ordered and Resulted from Time of ED Arrival Up to the Time of Departure from the ED   COMPREHENSIVE METABOLIC PANEL - Abnormal; Notable for the following components:       Result Value    Bilirubin Total 2.2 (*)     Protein Total 6.4 (*)     All other components within normal limits   CBC WITH PLATELETS DIFFERENTIAL   SALICYLATE LEVEL   ALCOHOL ETHYL   ACETAMINOPHEN LEVEL   ACETAMINOPHEN   DRUG ABUSE SCREEN 6 CHEM DEP URINE ([facility])            Assessments & Plan (with Medical Decision Making)   Is a 19-year-old male who presents after intentional acetaminophen overdose.  Patient took 25 500 mg tablets of acetaminophen.  This was an intentional suicidal overdose.  No other coingestions.  Patient did have some nausea but no other symptoms.  Exam demonstrate no acute abnormalities.  I discussed the case with poison control who recommends activated charcoal as this is an acute ingestion.  However however they state this can be held if refused.  Patient did refuse his medication.  They recommend obtaining a 4-hour acetaminophen level and starting NAC if indicated.  They recommend against obtaining an  earlier level as well as giving MAC earlier as a level earlier than 4 hours would not be of value and patient is an increased risk of anaphylactoid reaction if MAC is given for a low acetaminophen level.  Lab work shows a T bili of 2.2.  No other acute lab abnormalities.  Salicylate levels negative.  Alcohol levels negative.  UDS is pending at this time. INR is pending at this time. The 4-hour level was not able to be run at this lab accurately and was sent to an outside lab.  As the level will not be back in time and patient does state he took 12.5 g of acetaminophen, after discussion with poison center it was agreed to start NAC.  We will give NAC in the emergency department and admit patient to the IM team.  Patient will then need to be seen by mental health after medical clearance.  At this point patient is voluntary, he does meet criteria for a hold if needed. We will admit for further monitoring work-up and treatment.    Acetaminophen level resulted and was found to be 142.9. I discussed this with poison control. During this discussion, it was found that patient had called them at 0135 prior to calling EMS. He told them that his ingestion was approximately one hour prior to him calling them. This was not previously discussed during earlier calls. This would place his ingestion time closer to midnight, rather than between 0100 and 0145 as patient originally stated. Patient's level is therefore likely in the toxic range. Patient had already been started on NAC prior to the return of the resulted level. I discussed the acetaminophen level result with poison control who recommends continuing with the current NAC treatment. I updated the patient on this result and the plan to continue treatment.     I have reviewed the nursing notes.    I have reviewed the findings, diagnosis, plan and need for follow up with the patient.    New Prescriptions    No medications on file       Final diagnoses:   Intentional  acetaminophen overdose, initial encounter (H)   Suicide attempt (H)       9/27/2019   [facility], [place], EMERGENCY DEPARTMENT     Ponce Menjivar, DO  09/27/19 0728       Ponce Menjivar,   09/27/19 1525     occupational therapy/speech language pathology

## 2019-10-05 PROBLEM — Z00.00 ENCOUNTER FOR PREVENTIVE HEALTH EXAMINATION: Noted: 2019-10-05

## 2019-10-07 ENCOUNTER — OUTPATIENT (OUTPATIENT)
Dept: OUTPATIENT SERVICES | Facility: HOSPITAL | Age: 69
LOS: 1 days | End: 2019-10-07
Payer: MEDICARE

## 2019-10-07 ENCOUNTER — APPOINTMENT (OUTPATIENT)
Dept: CT IMAGING | Facility: CLINIC | Age: 69
End: 2019-10-07

## 2019-10-07 DIAGNOSIS — Z00.00 ENCOUNTER FOR GENERAL ADULT MEDICAL EXAMINATION WITHOUT ABNORMAL FINDINGS: ICD-10-CM

## 2019-10-07 DIAGNOSIS — Z78.9 OTHER SPECIFIED HEALTH STATUS: Chronic | ICD-10-CM

## 2019-10-07 PROCEDURE — 71250 CT THORAX DX C-: CPT | Mod: 26

## 2019-10-07 PROCEDURE — 71250 CT THORAX DX C-: CPT

## 2020-05-18 NOTE — PROGRESS NOTE ADULT - SUBJECTIVE AND OBJECTIVE BOX
Sent to me in error, not my patient INTERVAL HPI/OVERNIGHT EVENTS:  67y Male s/p fall, + LOC, found with left SDH and SAH, also found with tiny left corona radiata infarct. Patient seen earlier this AM sleeping comfortably in bed, awoke to voice. Continues to c/o headache, especially near his eyes. No vision changes. States he would like to get to rehab as soon as possible to improve his symptoms    CT head done yesterday 3/16/18 stable; was ordered due to lethargy and worsening R sided weakness, of which AMS resolved later in the day, and R sided weakness unchanged from admission examination    Vital Signs Last 24 Hrs  T(C): 36.6 (17 Mar 2018 00:27), Max: 36.8 (16 Mar 2018 12:29)  T(F): 97.9 (17 Mar 2018 00:27), Max: 98.2 (16 Mar 2018 12:29)  HR: 60 (17 Mar 2018 05:11) (60 - 79)  BP: 174/84 (17 Mar 2018 05:11) (143/55 - 174/84)  BP(mean): 101 (16 Mar 2018 10:00) (101 - 112)  RR: 18 (17 Mar 2018 05:11) (18 - 27)  SpO2: 94% (17 Mar 2018 05:11) (93% - 97%)    PHYSICAL EXAM:  GENERAL: NAD, well-groomed, well-developed  HEAD: + traumatic- small L periorbital ecchymosis, healing well  MENTAL STATUS: Oriented to person, place and time; Awoke easily to voice; speech fluent- able to converse today in complete sentences with more than just a few words. Paraphasic errors improved; continues to have word finding difficulty- unable to name high/low frequency objects, but able to name their purpose. During conversation less paraphasic errors/word finding difficulty noted today compared to previous examinations. Attention is good, followed both one step and two step commands  CRANIAL NERVES: PERRL ~4mm; EOMI; no facial asymmetry; facial sensation grossly intact to light touch b/l;  tongue midline  MOTOR: LUE/LLE 5/5. RUE hand  4+/5 otherwise 0/5, RLE HF 1/5 otherwise 0/5.  SENSATION: grossly intact to light touch all extremities  CHEST/LUNG: Clear to auscultation bilaterally  HEART: +S1/+S2; Regular rate and rhythm  ABDOMEN: Soft, nontender, nondistended  EXTREMITIES: RUE ecchymosis. Sling in place  SKIN: Warm, dry    LABS:                        13.9   9.5   )-----------( 183      ( 17 Mar 2018 07:30 )             41.6     03-17    141  |  100  |  9.0  ----------------------------<  115  3.4<L>   |  26.0  |  0.55    Ca    9.2      17 Mar 2018 07:30  Phos  3.2     03-17  Mg     2.1     03-17 03-16 @ 07:01  -  03-17 @ 07:00  --------------------------------------------------------  IN: 120 mL / OUT: 1200 mL / NET: -1080 mL    RADIOLOGY & ADDITIONAL TESTS:  CT Head No Cont (03.16.18 @ 08:31)  IMPRESSION: Evolving hemorrhagic contusions and mildly improved bilateral   subarachnoid hemorrhage. Stable intraventricular hemorrhage. Thin   posterior falx and left tentorial leaflet subdural hemorrhage. No   hydrocephalus.    MR Cervical Spine No Cont (03.13.18 @ 17:56)  IMPRESSION:  Motion limited examwithout gross acute abnormality. Spondylosis without   cord compression    MR Angio Neck No Cont (03.13.18 @ 17:20)  IMPRESSION:  Normal study    MR Angio Head No Cont (03.13.18 @ 16:57)  Impression:  Normal study.    MR Head No Cont (03.13.18 @ 16:57)  IMPRESSION:  Known intracranial hemorrhage with cerebral contusion. Tiny acute   subcortical infarct on the left.    CT Maxillofacial No Cont (03.13.18 @ 06:08)  IMPRESSION:    Partially imaged nondisplaced right temporal and bilateral parietal   calvarial fractures. No displaced facial fractures. Bilateral left   greater than right subdural hematoma and parenchymal contusions with   regional mass effect and midline shift. Motion limits evaluation of the   mandible. Follow-up exam is suggested to ensure stability    - from: CT Head No Cont (03.13.18 @ 06:07)  IMPRESSION:    1)  increasing subarachnoid the blood noted as compared to prior CT. Also   hemorrhagic contusions can be seen in both hemispheres. Aa left-sided   subdural hematoma is only slightly worsened. There is mild midline shift   without hydrocephalus..  2)  no acute infarct seen. Fractures again noted.    CT Cervical Spine No Cont (03.12.18 @ 23:58)  IMPRESSION:  No acute cervical spine fracture. Degenerative changesnoted   Nondisplaced right first rib fracture.  Right distal clavicle fracture    CT Head No Cont (03.12.18 @ 23:57)  IMPRESSION:  Left convexity subdural hematoma measuring up to 5 mm in thickness mainly   along the left frontal and temporal convexities.  Scattered bilateral subarachnoid hemorrhage.  Potential 7 mm hemorrhagic contusion along the left parietal lobe.   Follow-up recommended.  Contiguous nondisplaced fracture extending along bilateral parietal bones   and right temporal bone.   Nondisplaced fracture noted along the posterior aspect of the right   zygomatic arch.

## 2021-08-27 NOTE — ED PROVIDER NOTE - NEUROLOGICAL LEVEL OF CONSCIOUSNESS
This is a telemedicine visit with live, interactive video and audio. Patient understands and accepts financial responsibility for any deductible, co-insurance and/or co-pays associated with this service.     SUBJECTIVE  She is scheduled video visit toda polyp   • COLONOSCOPY      age 27   • COLONOSCOPY      age 42   • LUMPECTOMY LEFT Left 09/09/2019    DCIS;ADH   • DRISS LOCALIZATION WIRE 1 SITE LEFT (CPT=19281)  08/2019    reexcision for clean margins 10/2019   • NEEDLE BIOPSY LEFT  08/2019    US guided bx 5 % Oral Solution Take 4 drops by mouth daily. (Patient not taking: Reported on 7/6/2021 )     • Ascorbic Acid (C-500/TORRES HIPS OR) Take 1 tablet by mouth daily. • Multiple Vitamins-Minerals (MULTIVITAMIN ADULT OR) Take 1 tablet by mouth daily. CONFUSED/GCS=13.

## 2021-12-07 ENCOUNTER — TRANSCRIPTION ENCOUNTER (OUTPATIENT)
Age: 71
End: 2021-12-07

## 2022-02-19 ENCOUNTER — INPATIENT (INPATIENT)
Facility: HOSPITAL | Age: 72
LOS: 1 days | Discharge: HOME | DRG: 379 | End: 2022-02-21
Attending: INTERNAL MEDICINE | Admitting: INTERNAL MEDICINE
Payer: MEDICARE

## 2022-02-19 VITALS
HEIGHT: 71 IN | SYSTOLIC BLOOD PRESSURE: 164 MMHG | DIASTOLIC BLOOD PRESSURE: 97 MMHG | TEMPERATURE: 99 F | RESPIRATION RATE: 20 BRPM | HEART RATE: 103 BPM | WEIGHT: 220.02 LBS | OXYGEN SATURATION: 98 %

## 2022-02-19 DIAGNOSIS — K92.2 GASTROINTESTINAL HEMORRHAGE, UNSPECIFIED: ICD-10-CM

## 2022-02-19 DIAGNOSIS — Z98.890 OTHER SPECIFIED POSTPROCEDURAL STATES: Chronic | ICD-10-CM

## 2022-02-19 LAB
ALBUMIN SERPL ELPH-MCNC: 4.2 G/DL — SIGNIFICANT CHANGE UP (ref 3.3–5.2)
ALP SERPL-CCNC: 75 U/L — SIGNIFICANT CHANGE UP (ref 40–120)
ALT FLD-CCNC: 27 U/L — SIGNIFICANT CHANGE UP
ANION GAP SERPL CALC-SCNC: 11 MMOL/L — SIGNIFICANT CHANGE UP (ref 5–17)
APTT BLD: 31.7 SEC — SIGNIFICANT CHANGE UP (ref 27.5–35.5)
AST SERPL-CCNC: 29 U/L — SIGNIFICANT CHANGE UP
BASOPHILS # BLD AUTO: 0.03 K/UL — SIGNIFICANT CHANGE UP (ref 0–0.2)
BASOPHILS NFR BLD AUTO: 0.4 % — SIGNIFICANT CHANGE UP (ref 0–2)
BILIRUB SERPL-MCNC: 0.4 MG/DL — SIGNIFICANT CHANGE UP (ref 0.4–2)
BUN SERPL-MCNC: 22.1 MG/DL — HIGH (ref 8–20)
CALCIUM SERPL-MCNC: 9.2 MG/DL — SIGNIFICANT CHANGE UP (ref 8.6–10.2)
CHLORIDE SERPL-SCNC: 103 MMOL/L — SIGNIFICANT CHANGE UP (ref 98–107)
CO2 SERPL-SCNC: 25 MMOL/L — SIGNIFICANT CHANGE UP (ref 22–29)
CREAT SERPL-MCNC: 0.81 MG/DL — SIGNIFICANT CHANGE UP (ref 0.5–1.3)
EOSINOPHIL # BLD AUTO: 0.19 K/UL — SIGNIFICANT CHANGE UP (ref 0–0.5)
EOSINOPHIL NFR BLD AUTO: 2.7 % — SIGNIFICANT CHANGE UP (ref 0–6)
GLUCOSE SERPL-MCNC: 108 MG/DL — HIGH (ref 70–99)
HCT VFR BLD CALC: 39.3 % — SIGNIFICANT CHANGE UP (ref 39–50)
HCT VFR BLD CALC: 42.1 % — SIGNIFICANT CHANGE UP (ref 39–50)
HGB BLD-MCNC: 12.8 G/DL — LOW (ref 13–17)
HGB BLD-MCNC: 14.1 G/DL — SIGNIFICANT CHANGE UP (ref 13–17)
IMM GRANULOCYTES NFR BLD AUTO: 0.6 % — SIGNIFICANT CHANGE UP (ref 0–1.5)
INR BLD: 1.05 RATIO — SIGNIFICANT CHANGE UP (ref 0.88–1.16)
LYMPHOCYTES # BLD AUTO: 1.37 K/UL — SIGNIFICANT CHANGE UP (ref 1–3.3)
LYMPHOCYTES # BLD AUTO: 19.7 % — SIGNIFICANT CHANGE UP (ref 13–44)
MCHC RBC-ENTMCNC: 29.5 PG — SIGNIFICANT CHANGE UP (ref 27–34)
MCHC RBC-ENTMCNC: 29.8 PG — SIGNIFICANT CHANGE UP (ref 27–34)
MCHC RBC-ENTMCNC: 32.6 GM/DL — SIGNIFICANT CHANGE UP (ref 32–36)
MCHC RBC-ENTMCNC: 33.5 GM/DL — SIGNIFICANT CHANGE UP (ref 32–36)
MCV RBC AUTO: 89 FL — SIGNIFICANT CHANGE UP (ref 80–100)
MCV RBC AUTO: 90.6 FL — SIGNIFICANT CHANGE UP (ref 80–100)
MONOCYTES # BLD AUTO: 0.86 K/UL — SIGNIFICANT CHANGE UP (ref 0–0.9)
MONOCYTES NFR BLD AUTO: 12.4 % — SIGNIFICANT CHANGE UP (ref 2–14)
NEUTROPHILS # BLD AUTO: 4.46 K/UL — SIGNIFICANT CHANGE UP (ref 1.8–7.4)
NEUTROPHILS NFR BLD AUTO: 64.2 % — SIGNIFICANT CHANGE UP (ref 43–77)
PLATELET # BLD AUTO: 190 K/UL — SIGNIFICANT CHANGE UP (ref 150–400)
PLATELET # BLD AUTO: 211 K/UL — SIGNIFICANT CHANGE UP (ref 150–400)
POTASSIUM SERPL-MCNC: 3.7 MMOL/L — SIGNIFICANT CHANGE UP (ref 3.5–5.3)
POTASSIUM SERPL-SCNC: 3.7 MMOL/L — SIGNIFICANT CHANGE UP (ref 3.5–5.3)
PROT SERPL-MCNC: 6.4 G/DL — LOW (ref 6.6–8.7)
PROTHROM AB SERPL-ACNC: 12.1 SEC — SIGNIFICANT CHANGE UP (ref 10.6–13.6)
RBC # BLD: 4.34 M/UL — SIGNIFICANT CHANGE UP (ref 4.2–5.8)
RBC # BLD: 4.73 M/UL — SIGNIFICANT CHANGE UP (ref 4.2–5.8)
RBC # FLD: 13.2 % — SIGNIFICANT CHANGE UP (ref 10.3–14.5)
RBC # FLD: 13.3 % — SIGNIFICANT CHANGE UP (ref 10.3–14.5)
SODIUM SERPL-SCNC: 138 MMOL/L — SIGNIFICANT CHANGE UP (ref 135–145)
WBC # BLD: 6.95 K/UL — SIGNIFICANT CHANGE UP (ref 3.8–10.5)
WBC # BLD: 8.16 K/UL — SIGNIFICANT CHANGE UP (ref 3.8–10.5)
WBC # FLD AUTO: 6.95 K/UL — SIGNIFICANT CHANGE UP (ref 3.8–10.5)
WBC # FLD AUTO: 8.16 K/UL — SIGNIFICANT CHANGE UP (ref 3.8–10.5)

## 2022-02-19 PROCEDURE — 71045 X-RAY EXAM CHEST 1 VIEW: CPT | Mod: 26

## 2022-02-19 PROCEDURE — 99223 1ST HOSP IP/OBS HIGH 75: CPT

## 2022-02-19 PROCEDURE — G1004: CPT | Mod: MG

## 2022-02-19 PROCEDURE — 99285 EMERGENCY DEPT VISIT HI MDM: CPT

## 2022-02-19 PROCEDURE — 93010 ELECTROCARDIOGRAM REPORT: CPT

## 2022-02-19 PROCEDURE — 74177 CT ABD & PELVIS W/CONTRAST: CPT | Mod: 26,MG

## 2022-02-19 RX ORDER — PANTOPRAZOLE SODIUM 20 MG/1
40 TABLET, DELAYED RELEASE ORAL
Refills: 0 | Status: DISCONTINUED | OUTPATIENT
Start: 2022-02-19 | End: 2022-02-21

## 2022-02-19 RX ORDER — ONDANSETRON 8 MG/1
4 TABLET, FILM COATED ORAL EVERY 8 HOURS
Refills: 0 | Status: DISCONTINUED | OUTPATIENT
Start: 2022-02-19 | End: 2022-02-21

## 2022-02-19 RX ORDER — ACETAMINOPHEN 500 MG
650 TABLET ORAL EVERY 6 HOURS
Refills: 0 | Status: DISCONTINUED | OUTPATIENT
Start: 2022-02-19 | End: 2022-02-21

## 2022-02-19 RX ADMIN — PANTOPRAZOLE SODIUM 40 MILLIGRAM(S): 20 TABLET, DELAYED RELEASE ORAL at 22:28

## 2022-02-19 NOTE — ED PROVIDER NOTE - ATTENDING CONTRIBUTION TO CARE
I performed a face to face history and physical exam of the patient and discussed their management with the student/resident/ACP. I reviewed the student/resident/ACP's note and agree with the documented findings and plan of care.    Pt states that today he has had 4 episodes of BRBPR.  Pt states that it is a moderate amount and wife states that she might have seen some clots. no abd pain. never had this before. takes baby ASA no AC.  no cp. no sob. no other complaints.    physical - rrr. ctab. abd - soft, nt. no edema. no rash.    plan - labs and imaging reviewed.  H/H normal. VS initially tachy but has since resolved.  normotensive.  CT with active hemorrhage.  Pt with 2 more episodes of BRBPR in ED. Dr. Redman consulted and will see patient in AM. Case d/w Dr. Maier for admission.

## 2022-02-19 NOTE — H&P ADULT - ASSESSMENT
72yo M with pmh of SAH(2018), HTN, HLD, Hypothyroid presented to ED c/o rectal bleeding.    GIB  -likely colonic diverticular bleed   -with rectal bleeding  -initial h/h 14, repeat 12.8  -hemodynamically stable at this time but still having intermittent BRBPR   -CTA with Active site of hemorrhage at junction descending and sigmoid colon  -check serial h/h, transfuse if <7 or symptomatic, vitals Q4hrs   -RN to notify MD if further bleeding  -cont with protonic 40mg IV BID   -Dr. Redman consulted and will see patient in AM     HTN  -hold home bp meds in the setting of acute bleed   -resume meds as needed     HLD  -cont atorvastatin 40mg po daily     Hypothyroid  -cont synthroid     DVT   -no AC due to active bleeding

## 2022-02-19 NOTE — ED ADULT NURSE REASSESSMENT NOTE - NS ED NURSE REASSESS COMMENT FT1
pt handed off to NICOLAS Manzo  in stable condition. Pt oriented to unit, plan of care explained. call bell system explained to pt. no apparent distress noted at this time.

## 2022-02-19 NOTE — PATIENT PROFILE ADULT - FALL HARM RISK - HARM RISK INTERVENTIONS

## 2022-02-19 NOTE — ED PROVIDER NOTE - PHYSICAL EXAMINATION
Vital Signs Last 24 Hrs  T(C): 37.1 (19 Feb 2022 14:31), Max: 37.1 (19 Feb 2022 14:31)  T(F): 98.7 (19 Feb 2022 14:31), Max: 98.7 (19 Feb 2022 14:31)  HR: 103 (19 Feb 2022 14:31) (103 - 103)  BP: 164/97 (19 Feb 2022 14:31) (164/97 - 164/97)  BP(mean): --  RR: 20 (19 Feb 2022 14:31) (20 - 20)  SpO2: 98% (19 Feb 2022 14:31) (98% - 98%)    PHYSICAL EXAM:      Constitutional:    Eyes: EOMI, no scleral icterus  Head: NC/AT  Respiratory: CTAB, no wheezes/rales  Cardiovascular: RRR, normal S1S2, no m/r/g  Gastrointestinal: soft, nontender, nondistended  Vascular: no LE edema  Neurological: A&Ox3  Musculoskeletal: BACON spontaneously  Psychiatric: calm, cooperative  Skin: no jaundice

## 2022-02-19 NOTE — H&P ADULT - HISTORY OF PRESENT ILLNESS
70yo M with pmh of SAH 72yo M with pmh of SAH(2018), HTN, HLD, Hypothyroid presented to ED c/o rectal bleeding. Pt states he has multiple episodes of bright red blood per rectum prior to ED presentation, denies abdominal pain or cramping, fever, chills, no prior GIB. Pt states he had more episode of rectal bleeding while in the ER. In the ED h/h 14/42, CTA a/p showed Active site of hemorrhage at junction descending and sigmoid colon possibly related to a diverticulum. 70yo M with pmh of SAH(2018), HTN, HLD, Hypothyroid presented to ED c/o rectal bleeding. Pt states he has multiple episodes of bright red blood per rectum prior to ED presentation, denies abdominal pain or cramping, fever, chills, no prior GIB. Pt states he had more episode of rectal bleeding while in the ER. In the ED h/h 14/42, vitals stable, CTA a/p showed Active site of hemorrhage at junction descending and sigmoid colon possibly related to a diverticulum. Denies cp, sob, palpitations.

## 2022-02-19 NOTE — ED ADULT TRIAGE NOTE - CHIEF COMPLAINT QUOTE
Patient arrived to ED today with c/o 4 episodes of bloody stools today.  Patient denies blood thinner use.

## 2022-02-19 NOTE — H&P ADULT - NSICDXPASTMEDICALHX_GEN_ALL_CORE_FT
PAST MEDICAL HISTORY:  Hyperlipidemia     Hypertension     Hypothyroid     SAH (subarachnoid hemorrhage)

## 2022-02-19 NOTE — ED ADULT NURSE REASSESSMENT NOTE - NS ED NURSE REASSESS COMMENT FT1
Pt Aox4. Pt resting comfortably upon entering room. Pt reporting of having 4 bowel movements today, in which he saw bright red blood in each stool. Pt starts this started today. Pt denies blood thinners but states he takes baby aspirin. Pt denies dizziness, N/V, SOB, chest pain, headache, abd pain. Accompanied by wife. Doctor bedside, Labs drawn. VS taken. pt educated on plan of care, pt able to successfully teach back plan of care to RN, RN will continue to reeducate pt during hospital stay.

## 2022-02-19 NOTE — ED PROVIDER NOTE - CLINICAL SUMMARY MEDICAL DECISION MAKING FREE TEXT BOX
72 yo M w/ new onset bloody stools. Colonoscopy from 3 years ago benign. Hx suspicious for diverticular bleed.    -CBC, BMP  -Type and screen  -CT a/p w/ IV contrast as per attestation

## 2022-02-19 NOTE — H&P ADULT - NSHPPHYSICALEXAM_GEN_ALL_CORE
T(C): 36.7 (02-20-22 @ 00:20), Max: 37.1 (02-19-22 @ 14:31)  HR: 95 (02-20-22 @ 00:20) (89 - 107)  BP: 144/89 (02-20-22 @ 00:20) (133/87 - 164/97)  RR: 18 (02-20-22 @ 00:20) (16 - 20)  SpO2: 93% (02-20-22 @ 00:20) (93% - 98%)    GENERAL: patient appears well, no acute distress, appropriate, pleasant  EYES: sclera clear, no exudates  ENMT: oropharynx clear without erythema, no exudates, moist mucous membranes  NECK: supple, soft, no thyromegaly noted  LUNGS: good air entry bilaterally, clear to auscultation, symmetric breath sounds, no wheezing or rhonchi appreciated  HEART: soft S1/S2, regular rate and rhythm, no murmurs noted, no lower extremity edema  GASTROINTESTINAL: abdomen is soft, nontender, nondistended, normoactive bowel sounds, no palpable masses  INTEGUMENT: good skin turgor, warm skin, appears well perfused  MUSCULOSKELETAL: no clubbing or cyanosis, no obvious deformity  NEUROLOGIC: awake, alert, oriented x3, good muscle tone in 4 extremities, no obvious sensory deficits  PSYCHIATRIC: mood is good, affect is congruent, linear and logical thought process  HEME/LYMPH: no palpable supraclavicular nodules, no obvious ecchymosis or petechiae

## 2022-02-19 NOTE — ED PROVIDER NOTE - OBJECTIVE STATEMENT
70 yo M w/ PMH of HTN and HLD who p/w 4 episodes of bloody stools. All episodes within past 1.5 hours of arrival to ED. Pt denies abdominal and anal pain. Blood is bright red. Denies diarrhea and constipation. Pt states this has never happened before and that he otherwise feels at baseline. Hx of benign polyps on colonoscopy with last colonoscopy ~3 years ago. Denies f/c. Pt endorses drinking couple glasses of whiskey per night.

## 2022-02-19 NOTE — ED ADULT NURSE NOTE - OBJECTIVE STATEMENT
pt comes to ED with reports of BRBPR x 4 today, reports no abdominal pain, denies nausea/vomiting, states bright red without clots. pt reports he has no dizziness, no abdominal tenderness noted on exam. skin warm dry and intact, denies recent illness or fevers. pt reports no hematuria, no bleeding elsewhere. AOX3.

## 2022-02-20 DIAGNOSIS — K92.2 GASTROINTESTINAL HEMORRHAGE, UNSPECIFIED: ICD-10-CM

## 2022-02-20 LAB
ANION GAP SERPL CALC-SCNC: 12 MMOL/L — SIGNIFICANT CHANGE UP (ref 5–17)
BUN SERPL-MCNC: 18.4 MG/DL — SIGNIFICANT CHANGE UP (ref 8–20)
CALCIUM SERPL-MCNC: 8.4 MG/DL — LOW (ref 8.6–10.2)
CHLORIDE SERPL-SCNC: 103 MMOL/L — SIGNIFICANT CHANGE UP (ref 98–107)
CO2 SERPL-SCNC: 24 MMOL/L — SIGNIFICANT CHANGE UP (ref 22–29)
CREAT SERPL-MCNC: 0.7 MG/DL — SIGNIFICANT CHANGE UP (ref 0.5–1.3)
GLUCOSE SERPL-MCNC: 106 MG/DL — HIGH (ref 70–99)
HCT VFR BLD CALC: 34.4 % — LOW (ref 39–50)
HCV AB S/CO SERPL IA: 0.08 S/CO — SIGNIFICANT CHANGE UP (ref 0–0.99)
HCV AB SERPL-IMP: SIGNIFICANT CHANGE UP
HGB BLD-MCNC: 11.4 G/DL — LOW (ref 13–17)
MCHC RBC-ENTMCNC: 29.7 PG — SIGNIFICANT CHANGE UP (ref 27–34)
MCHC RBC-ENTMCNC: 33.1 GM/DL — SIGNIFICANT CHANGE UP (ref 32–36)
MCV RBC AUTO: 89.6 FL — SIGNIFICANT CHANGE UP (ref 80–100)
PLATELET # BLD AUTO: 179 K/UL — SIGNIFICANT CHANGE UP (ref 150–400)
POTASSIUM SERPL-MCNC: 3.8 MMOL/L — SIGNIFICANT CHANGE UP (ref 3.5–5.3)
POTASSIUM SERPL-SCNC: 3.8 MMOL/L — SIGNIFICANT CHANGE UP (ref 3.5–5.3)
RBC # BLD: 3.84 M/UL — LOW (ref 4.2–5.8)
RBC # FLD: 13.4 % — SIGNIFICANT CHANGE UP (ref 10.3–14.5)
SARS-COV-2 RNA SPEC QL NAA+PROBE: SIGNIFICANT CHANGE UP
SODIUM SERPL-SCNC: 139 MMOL/L — SIGNIFICANT CHANGE UP (ref 135–145)
WBC # BLD: 8.09 K/UL — SIGNIFICANT CHANGE UP (ref 3.8–10.5)
WBC # FLD AUTO: 8.09 K/UL — SIGNIFICANT CHANGE UP (ref 3.8–10.5)

## 2022-02-20 PROCEDURE — 99223 1ST HOSP IP/OBS HIGH 75: CPT

## 2022-02-20 PROCEDURE — 99233 SBSQ HOSP IP/OBS HIGH 50: CPT

## 2022-02-20 RX ORDER — SOD SULF/SODIUM/NAHCO3/KCL/PEG
4000 SOLUTION, RECONSTITUTED, ORAL ORAL ONCE
Refills: 0 | Status: ACTIVE | OUTPATIENT
Start: 2022-02-20 | End: 2022-02-20

## 2022-02-20 RX ORDER — LANOLIN ALCOHOL/MO/W.PET/CERES
5 CREAM (GRAM) TOPICAL AT BEDTIME
Refills: 0 | Status: DISCONTINUED | OUTPATIENT
Start: 2022-02-20 | End: 2022-02-21

## 2022-02-20 RX ORDER — LEVOTHYROXINE SODIUM 125 MCG
137 TABLET ORAL DAILY
Refills: 0 | Status: DISCONTINUED | OUTPATIENT
Start: 2022-02-20 | End: 2022-02-21

## 2022-02-20 RX ORDER — ATORVASTATIN CALCIUM 80 MG/1
40 TABLET, FILM COATED ORAL AT BEDTIME
Refills: 0 | Status: DISCONTINUED | OUTPATIENT
Start: 2022-02-20 | End: 2022-02-21

## 2022-02-20 RX ORDER — SOD SULF/SODIUM/NAHCO3/KCL/PEG
4000 SOLUTION, RECONSTITUTED, ORAL ORAL ONCE
Refills: 0 | Status: DISCONTINUED | OUTPATIENT
Start: 2022-02-20 | End: 2022-02-20

## 2022-02-20 RX ADMIN — PANTOPRAZOLE SODIUM 40 MILLIGRAM(S): 20 TABLET, DELAYED RELEASE ORAL at 17:36

## 2022-02-20 RX ADMIN — Medication 5 MILLIGRAM(S): at 00:52

## 2022-02-20 RX ADMIN — ATORVASTATIN CALCIUM 40 MILLIGRAM(S): 80 TABLET, FILM COATED ORAL at 21:04

## 2022-02-20 RX ADMIN — PANTOPRAZOLE SODIUM 40 MILLIGRAM(S): 20 TABLET, DELAYED RELEASE ORAL at 06:17

## 2022-02-20 RX ADMIN — Medication 137 MICROGRAM(S): at 06:17

## 2022-02-20 NOTE — CONSULT NOTE ADULT - SUBJECTIVE AND OBJECTIVE BOX
Patient is a 71y old  Male who presents with a chief complaint of GIB (19 Feb 2022 20:31)      HPI:  70yo M with pmh of SAH(2018), HTN, HLD, Hypothyroid presented to ED c/o rectal bleeding. Patient states he has multiple episodes (5-6 total) of bright red blood per rectum that started yesterday around 1pm, which led him to come to ED.  Denies abdominal pain or cramping, fever, chills, no prior GIB. Pt states he had more episode of rectal bleeding while in the ER, last episode was 10pm. CTA a/p showed Active site of hemorrhage at junction descending and sigmoid colon possibly related to a diverticulum. GI consulted for further evaluation. At the time of my assessment, patient denies any further rectal bleeding since last night. Hgb trending down from the time of admission (14.1->12.8->11.4). SEAN showed scant amount of burgundy stool with red-tinged. Patient reports last colonoscopy was about 3 years ago (w/o significant findings, polypectomy) at [facility] in [facility] and patient would like to go back there if a colonoscopy is necessary. Denies any EGD in the past. Patient denies nausea, vomiting, abdominal pain, chest pain, shortness of breath, hematemesis. Reports taking Aspirin 81mg. Former smoker (Quit-12 yrs ago, 1PPD), Drinks 1-2 glass of alcoholic beverage every night.        PAST MEDICAL & SURGICAL HISTORY:  Hypertension    Hyperlipidemia    Hypothyroid    SAH (subarachnoid hemorrhage)    History of thyroid surgery        Allergies    fluorescein ophthalmic (Hives)    Intolerances        MEDICATIONS  (STANDING):  atorvastatin 40 milliGRAM(s) Oral at bedtime  levothyroxine 137 MICROGram(s) Oral daily  pantoprazole  Injectable 40 milliGRAM(s) IV Push two times a day    MEDICATIONS  (PRN):  acetaminophen     Tablet .. 650 milliGRAM(s) Oral every 6 hours PRN Temp greater or equal to 38C (100.4F), Mild Pain (1 - 3)  melatonin 5 milliGRAM(s) Oral at bedtime PRN Insomnia  ondansetron Injectable 4 milliGRAM(s) IV Push every 8 hours PRN Nausea and/or Vomiting      Social History:    Marital Status:  (  x )    (   ) Single    (   )    (  )   Occupation:   Lives with: (  ) alone  (  ) children   ( x ) spouse   (  ) parents  (  ) other    Substance Use (street drugs): (  ) never used  (  ) other:  Tobacco Usage:  (   ) never smoked   ( x ) former smoker   (   ) current smoker  (  1   ) pack year  (    12 years ago    ) last cigarette date  Alcohol Usage: Drinks 1-2 glass of alcoholic beverage every night.   Sexual History:     Family History   IBD (  ) Yes   (  ) No  GI Malignancy (  )  Yes    (  ) No    Health Management     Last Colonoscopy - About 3 years ago (w/o significant findings, polypectomy) at [facility] in [facility]  EGD - Denies      (     ) Advanced Directives: (     ) None    (      ) DNR    (     ) DNI    (     ) Health Care Proxy:     Review of Systems:    General:  No wt loss, fevers, chills, night sweats, fatigue,   CV:  No pain, palpitations, hypo/hypertension  Resp:  No dyspnea, cough, tachypnea, wheezing  GI:  See HPI   :  No pain, bleeding, incontinence, nocturia  Muscle:  No pain, weakness  Neuro:  No weakness, tingling, memory problems  Psych:  No fatigue, insomnia, mood problems, depression  Endocrine:  No polyuria, polydypsia, cold/heat intolerance  Heme:  No petechiae, ecchymosis, easy bruisability  Skin:  No rash, tattoos, scars, edema      Vital Signs Last 24 Hrs  T(C): 36.7 (20 Feb 2022 06:23), Max: 37.1 (19 Feb 2022 14:31)  T(F): 98 (20 Feb 2022 06:23), Max: 98.8 (20 Feb 2022 04:09)  HR: 100 (20 Feb 2022 06:23) (89 - 107)  BP: 162/95 (20 Feb 2022 06:23) (124/79 - 164/97)  BP(mean): --  RR: 18 (20 Feb 2022 06:23) (16 - 20)  SpO2: 96% (20 Feb 2022 06:23) (93% - 98%)    PHYSICAL EXAM:    Constitutional: NAD  Neck: No LAD, supple  Respiratory: CTA and P  Cardiovascular: S1 and S2, RRR, no M  Gastrointestinal: BS+, soft, NT/ND, neg HSM,  Extremities: No peripheral edema, neg clubbing, cyanosis  Vascular: 2+ peripheral pulses  Neurological: A/O x 3, no focal deficits  Psychiatric: Normal mood, normal affect  Skin: No rashes  SEAN showed scant amount of burgundy stool with red-tinged.       LABS:                        11.4   8.09  )-----------( 179      ( 20 Feb 2022 02:51 )             34.4     02-20    139  |  103  |  18.4  ----------------------------<  106<H>  3.8   |  24.0  |  0.70    Ca    8.4<L>      20 Feb 2022 02:51    TPro  6.4<L>  /  Alb  4.2  /  TBili  0.4  /  DBili  x   /  AST  29  /  ALT  27  /  AlkPhos  75  02-19    PT/INR - ( 19 Feb 2022 15:20 )   PT: 12.1 sec;   INR: 1.05 ratio         PTT - ( 19 Feb 2022 15:20 )  PTT:31.7 sec    LIVER FUNCTIONS - ( 19 Feb 2022 15:20 )  Alb: 4.2 g/dL / Pro: 6.4 g/dL / ALK PHOS: 75 U/L / ALT: 27 U/L / AST: 29 U/L / GGT: x             RADIOLOGY & ADDITIONAL TESTS:    < from: CT Abdomen and Pelvis w/ IV Cont (02.19.22 @ 17:37) >  ACC: [id number] EXAM:  CT ABDOMEN AND PELVIS IC                          PROCEDURE DATE:  02/19/2022          INTERPRETATION:  CLINICAL INFORMATION: 71-year-old man with 4 episodes of   hematochezia within 1.5 hours    COMPARISON: CT scan 03/12/2018    CONTRAST/COMPLICATIONS:  IV Contrast: Omnipaque 300  89 cc administered   11 cc discarded  Oral Contrast: NONE  Complications: None reported at time of study completion    PROCEDURE:  CT of the Abdomen and Pelvis was performed.  Precontrast, Arterial and Delayed phases were performed.  Sagittal and coronal reformats were performed.    FINDINGS:  LOWER CHEST: Aortic valve calcification.    LIVER: Within normal limits.  BILE DUCTS: Normal caliber.  GALLBLADDER: Within normal limits.  SPLEEN: Within normal limits.  PANCREAS: Within normal limits.  ADRENALS: Within normal limits.  KIDNEYS/URETERS: Within normal limits.    BLADDER: Within normal limits.  REPRODUCTIVE ORGANS: Normal size prostate    BOWEL: No bowel obstruction. Appendix normal. Active site of hemorrhage   identified at the junction of the descending colon and sigmoid colon   possibly related to a diverticulum (8:117-122). Colonic diverticula.  PERITONEUM: No ascites.  VESSELS: Atherosclerotic changes abdominal aorta. Patent celiac axis and   branches, SMA, bilateral renal arteries, GARY, and bilateral common,   external, and internal iliac arteries.  RETROPERITONEUM/LYMPH NODES: No lymphadenopathy.  ABDOMINAL WALL: Within normal limits.  BONES: Degenerative change.    IMPRESSION:  Active site of hemorrhage at junction descending and sigmoid colon   possibly related to a diverticulum.    Findings discussed with provider Amadou on 02/19/2022 at 5:50 PM  with   read back.        --- End of Report ---            MARIA EUGENIA VELA MD; Attending Radiologist  This document has been electronically signed. Feb 19 2022  5:51PM    < end of copied text >       Patient is a 71y old  Male who presents with a chief complaint of GIB (19 Feb 2022 20:31)      HPI:  70yo M with pmh of SAH(2018), HTN, HLD, Hypothyroid presented to ED c/o rectal bleeding. Patient states he has multiple episodes (5-6 total) of bright red blood per rectum that started yesterday around 1pm, which led him to come to ED.  Denies abdominal pain or cramping, fever, chills, no prior GIB. Pt states he had more episode of rectal bleeding while in the ER, last episode was 10pm. CTA a/p showed Active site of hemorrhage at junction descending and sigmoid colon possibly related to a diverticulum. GI consulted for further evaluation. At the time of my assessment, patient denies any further rectal bleeding since last night. Hgb trending down from the time of admission (14.1->12.8->11.4). SEAN showed scant amount of burgundy stool with red-tinged. Patient reports last colonoscopy was about 3 years ago (w/o significant findings, polypectomy) at [facility] in [facility] and patient would like to go back there if a colonoscopy is necessary. Denies any EGD in the past. Patient denies nausea, vomiting, abdominal pain, chest pain, shortness of breath, hematemesis. Reports taking Aspirin 81mg. Former smoker (Quit-12 yrs ago, 1PPD), Drinks 1-2 glass of Whiskey every night.        PAST MEDICAL & SURGICAL HISTORY:  Hypertension    Hyperlipidemia    Hypothyroid    SAH (subarachnoid hemorrhage)    History of thyroid surgery        Allergies    fluorescein ophthalmic (Hives)    Intolerances        MEDICATIONS  (STANDING):  atorvastatin 40 milliGRAM(s) Oral at bedtime  levothyroxine 137 MICROGram(s) Oral daily  pantoprazole  Injectable 40 milliGRAM(s) IV Push two times a day    MEDICATIONS  (PRN):  acetaminophen     Tablet .. 650 milliGRAM(s) Oral every 6 hours PRN Temp greater or equal to 38C (100.4F), Mild Pain (1 - 3)  melatonin 5 milliGRAM(s) Oral at bedtime PRN Insomnia  ondansetron Injectable 4 milliGRAM(s) IV Push every 8 hours PRN Nausea and/or Vomiting      Social History:    Marital Status:  (  x )    (   ) Single    (   )    (  )   Occupation:   Lives with: (  ) alone  (  ) children   ( x ) spouse   (  ) parents  (  ) other    Substance Use (street drugs): (  ) never used  (  ) other:  Tobacco Usage:  (   ) never smoked   ( x ) former smoker   (   ) current smoker  (  1   ) pack year  (    12 years ago    ) last cigarette date  Alcohol Usage: Drinks 1-2 glass of alcoholic beverage every night.   Sexual History:     Family History   IBD (  ) Yes   (  ) No  GI Malignancy (  )  Yes    (  ) No    Health Management     Last Colonoscopy - About 3 years ago (w/o significant findings, polypectomy) at [facility] in [facility]  EGD - Denies      (     ) Advanced Directives: (     ) None    (      ) DNR    (     ) DNI    (     ) Health Care Proxy:     Review of Systems:    General:  No wt loss, fevers, chills, night sweats, fatigue,   CV:  No pain, palpitations, hypo/hypertension  Resp:  No dyspnea, cough, tachypnea, wheezing  GI:  See HPI   :  No pain, bleeding, incontinence, nocturia  Muscle:  No pain, weakness  Neuro:  No weakness, tingling, memory problems  Psych:  No fatigue, insomnia, mood problems, depression  Endocrine:  No polyuria, polydypsia, cold/heat intolerance  Heme:  No petechiae, ecchymosis, easy bruisability  Skin:  No rash, tattoos, scars, edema      Vital Signs Last 24 Hrs  T(C): 36.7 (20 Feb 2022 06:23), Max: 37.1 (19 Feb 2022 14:31)  T(F): 98 (20 Feb 2022 06:23), Max: 98.8 (20 Feb 2022 04:09)  HR: 100 (20 Feb 2022 06:23) (89 - 107)  BP: 162/95 (20 Feb 2022 06:23) (124/79 - 164/97)  BP(mean): --  RR: 18 (20 Feb 2022 06:23) (16 - 20)  SpO2: 96% (20 Feb 2022 06:23) (93% - 98%)    PHYSICAL EXAM:    Constitutional: NAD  Neck: No LAD, supple  Respiratory: CTA and P  Cardiovascular: S1 and S2, RRR, no M  Gastrointestinal: BS+, soft, NT/ND, neg HSM,  Extremities: No peripheral edema, neg clubbing, cyanosis  Vascular: 2+ peripheral pulses  Neurological: A/O x 3, no focal deficits  Psychiatric: Normal mood, normal affect  Skin: No rashes  SEAN showed scant amount of burgundy stool with red-tinged.       LABS:                        11.4   8.09  )-----------( 179      ( 20 Feb 2022 02:51 )             34.4     02-20    139  |  103  |  18.4  ----------------------------<  106<H>  3.8   |  24.0  |  0.70    Ca    8.4<L>      20 Feb 2022 02:51    TPro  6.4<L>  /  Alb  4.2  /  TBili  0.4  /  DBili  x   /  AST  29  /  ALT  27  /  AlkPhos  75  02-19    PT/INR - ( 19 Feb 2022 15:20 )   PT: 12.1 sec;   INR: 1.05 ratio         PTT - ( 19 Feb 2022 15:20 )  PTT:31.7 sec    LIVER FUNCTIONS - ( 19 Feb 2022 15:20 )  Alb: 4.2 g/dL / Pro: 6.4 g/dL / ALK PHOS: 75 U/L / ALT: 27 U/L / AST: 29 U/L / GGT: x             RADIOLOGY & ADDITIONAL TESTS:    < from: CT Abdomen and Pelvis w/ IV Cont (02.19.22 @ 17:37) >  ACC: [id number] EXAM:  CT ABDOMEN AND PELVIS IC                          PROCEDURE DATE:  02/19/2022          INTERPRETATION:  CLINICAL INFORMATION: 71-year-old man with 4 episodes of   hematochezia within 1.5 hours    COMPARISON: CT scan 03/12/2018    CONTRAST/COMPLICATIONS:  IV Contrast: Omnipaque 300  89 cc administered   11 cc discarded  Oral Contrast: NONE  Complications: None reported at time of study completion    PROCEDURE:  CT of the Abdomen and Pelvis was performed.  Precontrast, Arterial and Delayed phases were performed.  Sagittal and coronal reformats were performed.    FINDINGS:  LOWER CHEST: Aortic valve calcification.    LIVER: Within normal limits.  BILE DUCTS: Normal caliber.  GALLBLADDER: Within normal limits.  SPLEEN: Within normal limits.  PANCREAS: Within normal limits.  ADRENALS: Within normal limits.  KIDNEYS/URETERS: Within normal limits.    BLADDER: Within normal limits.  REPRODUCTIVE ORGANS: Normal size prostate    BOWEL: No bowel obstruction. Appendix normal. Active site of hemorrhage   identified at the junction of the descending colon and sigmoid colon   possibly related to a diverticulum (8:117-122). Colonic diverticula.  PERITONEUM: No ascites.  VESSELS: Atherosclerotic changes abdominal aorta. Patent celiac axis and   branches, SMA, bilateral renal arteries, GARY, and bilateral common,   external, and internal iliac arteries.  RETROPERITONEUM/LYMPH NODES: No lymphadenopathy.  ABDOMINAL WALL: Within normal limits.  BONES: Degenerative change.    IMPRESSION:  Active site of hemorrhage at junction descending and sigmoid colon   possibly related to a diverticulum.    Findings discussed with provider Amadou on 02/19/2022 at 5:50 PM  with   read back.        --- End of Report ---            MARIA EUGENIA VELA MD; Attending Radiologist  This document has been electronically signed. Feb 19 2022  5:51PM    < end of copied text >       Patient is a 71y old  Male who presents with a chief complaint of GIB (19 Feb 2022 20:31)      HPI:  70yo M with pmh of SAH(2018), HTN, HLD, Hypothyroid presented to ED c/o rectal bleeding. Patient states he has multiple episodes (5-6 total) of bright red blood per rectum that started yesterday around 1pm, which led him to come to ED.  Denies abdominal pain or cramping, fever, chills, no prior GIB. Pt states he had more episode of rectal bleeding while in the ER, last episode was 10pm. CTA a/p showed Active site of hemorrhage at junction descending and sigmoid colon possibly related to a diverticulum. GI consulted for further evaluation. At the time of my assessment, patient denies any further rectal bleeding since last night. Hgb trending down from the time of admission (14.1->12.8->11.4). SEAN showed scant amount of burgundy stool with red-tinged. Patient reports last colonoscopy was about 3 years ago (w/o significant findings, polypectomy) at [facility] in [facility] and patient would like to go back there if a colonoscopy is necessary. Denies any EGD in the past. Patient denies nausea, vomiting, abdominal pain, chest pain, shortness of breath, hematemesis. Reports taking Aspirin 81mg. Former smoker (Quit-12 yrs ago, 1PPD), Drinks 1-2 glass of Whiskey every night.        PAST MEDICAL & SURGICAL HISTORY:  Hypertension    Hyperlipidemia    Hypothyroid    SAH (subarachnoid hemorrhage)    History of thyroid surgery        Allergies    fluorescein ophthalmic (Hives)    Intolerances        MEDICATIONS  (STANDING):  atorvastatin 40 milliGRAM(s) Oral at bedtime  levothyroxine 137 MICROGram(s) Oral daily  pantoprazole  Injectable 40 milliGRAM(s) IV Push two times a day    MEDICATIONS  (PRN):  acetaminophen     Tablet .. 650 milliGRAM(s) Oral every 6 hours PRN Temp greater or equal to 38C (100.4F), Mild Pain (1 - 3)  melatonin 5 milliGRAM(s) Oral at bedtime PRN Insomnia  ondansetron Injectable 4 milliGRAM(s) IV Push every 8 hours PRN Nausea and/or Vomiting      Social History:    Marital Status:  (  x )    (   ) Single    (   )    (  )   Occupation:   Lives with: (  ) alone  (  ) children   ( x ) spouse   (  ) parents  (  ) other    Substance Use (street drugs): (  ) never used  (  ) other:  Tobacco Usage:  (   ) never smoked   ( x ) former smoker   (   ) current smoker  (  1   ) pack year  (    12 years ago    ) last cigarette date  Alcohol Usage: Drinks 1-2 glass of alcoholic beverage every night.   Sexual History:     Family History   IBD (  ) Yes   (  ) No  GI Malignancy (  )  Yes    (  ) No    Health Management     Last Colonoscopy - About 3 years ago (w/o significant findings, polypectomy) at [facility] in [facility]  EGD - Denies      (     ) Advanced Directives: (     ) None    (      ) DNR    (     ) DNI    (     ) Health Care Proxy:     Review of Systems:    General:  No wt loss, fevers, chills, night sweats, fatigue,   CV:  No pain, palpitations, hypo/hypertension  Resp:  No dyspnea, cough, tachypnea, wheezing  GI:  See HPI   :  No pain, bleeding, incontinence, nocturia  Muscle:  No pain, weakness  Neuro:  No weakness, tingling, memory problems  Psych:  No fatigue, insomnia, mood problems, depression  Endocrine:  No polyuria, polydypsia, cold/heat intolerance  Heme:  No petechiae, ecchymosis, easy bruisability  Skin:  No rash, tattoos, scars, edema      Vital Signs Last 24 Hrs  T(C): 36.7 (20 Feb 2022 06:23), Max: 37.1 (19 Feb 2022 14:31)  T(F): 98 (20 Feb 2022 06:23), Max: 98.8 (20 Feb 2022 04:09)  HR: 100 (20 Feb 2022 06:23) (89 - 107)  BP: 162/95 (20 Feb 2022 06:23) (124/79 - 164/97)  BP(mean): --  RR: 18 (20 Feb 2022 06:23) (16 - 20)  SpO2: 96% (20 Feb 2022 06:23) (93% - 98%)    PHYSICAL EXAM:    Constitutional: NAD, overweight  Neck: No LAD, supple  Respiratory: CTA and P  Cardiovascular: S1 and S2, RRR, no M  Gastrointestinal: BS+, soft, NT/ND, neg HSM,  Extremities: No peripheral edema, neg clubbing, cyanosis  Vascular: 2+ peripheral pulses  Neurological: A/O x 3, no focal deficits  Psychiatric: Normal mood, normal affect  Skin: No rashes  SEAN showed scant amount of burgundy stool with red-tinged.       LABS:                        11.4   8.09  )-----------( 179      ( 20 Feb 2022 02:51 )             34.4     02-20    139  |  103  |  18.4  ----------------------------<  106<H>  3.8   |  24.0  |  0.70    Ca    8.4<L>      20 Feb 2022 02:51    TPro  6.4<L>  /  Alb  4.2  /  TBili  0.4  /  DBili  x   /  AST  29  /  ALT  27  /  AlkPhos  75  02-19    PT/INR - ( 19 Feb 2022 15:20 )   PT: 12.1 sec;   INR: 1.05 ratio         PTT - ( 19 Feb 2022 15:20 )  PTT:31.7 sec    LIVER FUNCTIONS - ( 19 Feb 2022 15:20 )  Alb: 4.2 g/dL / Pro: 6.4 g/dL / ALK PHOS: 75 U/L / ALT: 27 U/L / AST: 29 U/L / GGT: x             RADIOLOGY & ADDITIONAL TESTS:    < from: CT Abdomen and Pelvis w/ IV Cont (02.19.22 @ 17:37) >  ACC: [id number] EXAM:  CT ABDOMEN AND PELVIS IC                          PROCEDURE DATE:  02/19/2022          INTERPRETATION:  CLINICAL INFORMATION: 71-year-old man with 4 episodes of   hematochezia within 1.5 hours    COMPARISON: CT scan 03/12/2018    CONTRAST/COMPLICATIONS:  IV Contrast: Omnipaque 300  89 cc administered   11 cc discarded  Oral Contrast: NONE  Complications: None reported at time of study completion    PROCEDURE:  CT of the Abdomen and Pelvis was performed.  Precontrast, Arterial and Delayed phases were performed.  Sagittal and coronal reformats were performed.    FINDINGS:  LOWER CHEST: Aortic valve calcification.    LIVER: Within normal limits.  BILE DUCTS: Normal caliber.  GALLBLADDER: Within normal limits.  SPLEEN: Within normal limits.  PANCREAS: Within normal limits.  ADRENALS: Within normal limits.  KIDNEYS/URETERS: Within normal limits.    BLADDER: Within normal limits.  REPRODUCTIVE ORGANS: Normal size prostate    BOWEL: No bowel obstruction. Appendix normal. Active site of hemorrhage   identified at the junction of the descending colon and sigmoid colon   possibly related to a diverticulum (8:117-122). Colonic diverticula.  PERITONEUM: No ascites.  VESSELS: Atherosclerotic changes abdominal aorta. Patent celiac axis and   branches, SMA, bilateral renal arteries, GARY, and bilateral common,   external, and internal iliac arteries.  RETROPERITONEUM/LYMPH NODES: No lymphadenopathy.  ABDOMINAL WALL: Within normal limits.  BONES: Degenerative change.    IMPRESSION:  Active site of hemorrhage at junction descending and sigmoid colon   possibly related to a diverticulum.    Findings discussed with provider Amadou on 02/19/2022 at 5:50 PM  with   read back.        --- End of Report ---            MARIA EUGENIA VELA MD; Attending Radiologist  This document has been electronically signed. Feb 19 2022  5:51PM    < end of copied text >

## 2022-02-20 NOTE — CONSULT NOTE ADULT - ATTENDING COMMENTS
The overwhelming probability is that he has experienced a diverticular bleed which may have lessened or stopped since last night but I am not altogether certain.  I have encouraged him to undergo a colonoscopy tomorrow and he appears to be agreeable but not altogether certain.  I will plan for colonoscopy tomorrow and will write for the prep.  He can have clear liquids today and then n.p.o. after midnight.  He should also get an H&H every 8 hours and keep hemoglobin greater than 8.

## 2022-02-20 NOTE — CONSULT NOTE ADULT - PROBLEM SELECTOR RECOMMENDATION 9
GI bleed most likely secondary to a diverticular bleed. Patient reports 5-6 episodes of BRBPR from 1pm-10pm yesterday. CT of abdomen and pelvis revealed Active site of hemorrhage at junction descending and sigmoid colon possibly related to a diverticulum. Hgb trending down from the time of admission (14.1->12.8->11.4). SEAN showed scant amount of burgundy stool with red-tinged.    - Will plan for a tentative colonoscopy tomorrow. If patient is agreeable for procedure.    - Will Recommend Clear liquid diet today. NPO at midnight.    - Continue to trend CBC, transfuse for a hgb of 8 or higher.    - Continue PPI BID

## 2022-02-21 ENCOUNTER — TRANSCRIPTION ENCOUNTER (OUTPATIENT)
Age: 72
End: 2022-02-21

## 2022-02-21 VITALS
RESPIRATION RATE: 20 BRPM | HEART RATE: 90 BPM | TEMPERATURE: 98 F | SYSTOLIC BLOOD PRESSURE: 120 MMHG | OXYGEN SATURATION: 98 % | DIASTOLIC BLOOD PRESSURE: 68 MMHG

## 2022-02-21 LAB
ANION GAP SERPL CALC-SCNC: 10 MMOL/L — SIGNIFICANT CHANGE UP (ref 5–17)
APTT BLD: 29.6 SEC — SIGNIFICANT CHANGE UP (ref 27.5–35.5)
BASOPHILS # BLD AUTO: 0.02 K/UL — SIGNIFICANT CHANGE UP (ref 0–0.2)
BASOPHILS NFR BLD AUTO: 0.4 % — SIGNIFICANT CHANGE UP (ref 0–2)
BUN SERPL-MCNC: 15.4 MG/DL — SIGNIFICANT CHANGE UP (ref 8–20)
CALCIUM SERPL-MCNC: 8.4 MG/DL — LOW (ref 8.6–10.2)
CHLORIDE SERPL-SCNC: 104 MMOL/L — SIGNIFICANT CHANGE UP (ref 98–107)
CO2 SERPL-SCNC: 27 MMOL/L — SIGNIFICANT CHANGE UP (ref 22–29)
CREAT SERPL-MCNC: 0.71 MG/DL — SIGNIFICANT CHANGE UP (ref 0.5–1.3)
EOSINOPHIL # BLD AUTO: 0.22 K/UL — SIGNIFICANT CHANGE UP (ref 0–0.5)
EOSINOPHIL NFR BLD AUTO: 4 % — SIGNIFICANT CHANGE UP (ref 0–6)
GLUCOSE SERPL-MCNC: 94 MG/DL — SIGNIFICANT CHANGE UP (ref 70–99)
HCT VFR BLD CALC: 28.7 % — LOW (ref 39–50)
HCT VFR BLD CALC: 31.7 % — LOW (ref 39–50)
HGB BLD-MCNC: 10.5 G/DL — LOW (ref 13–17)
HGB BLD-MCNC: 9.5 G/DL — LOW (ref 13–17)
IMM GRANULOCYTES NFR BLD AUTO: 0.4 % — SIGNIFICANT CHANGE UP (ref 0–1.5)
INR BLD: 1.15 RATIO — SIGNIFICANT CHANGE UP (ref 0.88–1.16)
LYMPHOCYTES # BLD AUTO: 1.08 K/UL — SIGNIFICANT CHANGE UP (ref 1–3.3)
LYMPHOCYTES # BLD AUTO: 19.7 % — SIGNIFICANT CHANGE UP (ref 13–44)
MCHC RBC-ENTMCNC: 29.7 PG — SIGNIFICANT CHANGE UP (ref 27–34)
MCHC RBC-ENTMCNC: 30.1 PG — SIGNIFICANT CHANGE UP (ref 27–34)
MCHC RBC-ENTMCNC: 33.1 GM/DL — SIGNIFICANT CHANGE UP (ref 32–36)
MCHC RBC-ENTMCNC: 33.1 GM/DL — SIGNIFICANT CHANGE UP (ref 32–36)
MCV RBC AUTO: 89.7 FL — SIGNIFICANT CHANGE UP (ref 80–100)
MCV RBC AUTO: 90.8 FL — SIGNIFICANT CHANGE UP (ref 80–100)
MONOCYTES # BLD AUTO: 0.61 K/UL — SIGNIFICANT CHANGE UP (ref 0–0.9)
MONOCYTES NFR BLD AUTO: 11.1 % — SIGNIFICANT CHANGE UP (ref 2–14)
NEUTROPHILS # BLD AUTO: 3.53 K/UL — SIGNIFICANT CHANGE UP (ref 1.8–7.4)
NEUTROPHILS NFR BLD AUTO: 64.4 % — SIGNIFICANT CHANGE UP (ref 43–77)
PLATELET # BLD AUTO: 155 K/UL — SIGNIFICANT CHANGE UP (ref 150–400)
PLATELET # BLD AUTO: 185 K/UL — SIGNIFICANT CHANGE UP (ref 150–400)
POTASSIUM SERPL-MCNC: 3.5 MMOL/L — SIGNIFICANT CHANGE UP (ref 3.5–5.3)
POTASSIUM SERPL-SCNC: 3.5 MMOL/L — SIGNIFICANT CHANGE UP (ref 3.5–5.3)
PROTHROM AB SERPL-ACNC: 13.2 SEC — SIGNIFICANT CHANGE UP (ref 10.6–13.6)
RBC # BLD: 3.2 M/UL — LOW (ref 4.2–5.8)
RBC # BLD: 3.49 M/UL — LOW (ref 4.2–5.8)
RBC # FLD: 13.3 % — SIGNIFICANT CHANGE UP (ref 10.3–14.5)
RBC # FLD: 13.5 % — SIGNIFICANT CHANGE UP (ref 10.3–14.5)
SODIUM SERPL-SCNC: 141 MMOL/L — SIGNIFICANT CHANGE UP (ref 135–145)
WBC # BLD: 5.48 K/UL — SIGNIFICANT CHANGE UP (ref 3.8–10.5)
WBC # BLD: 7.82 K/UL — SIGNIFICANT CHANGE UP (ref 3.8–10.5)
WBC # FLD AUTO: 5.48 K/UL — SIGNIFICANT CHANGE UP (ref 3.8–10.5)
WBC # FLD AUTO: 7.82 K/UL — SIGNIFICANT CHANGE UP (ref 3.8–10.5)

## 2022-02-21 PROCEDURE — 80048 BASIC METABOLIC PNL TOTAL CA: CPT

## 2022-02-21 PROCEDURE — 85027 COMPLETE CBC AUTOMATED: CPT

## 2022-02-21 PROCEDURE — 74177 CT ABD & PELVIS W/CONTRAST: CPT | Mod: MG

## 2022-02-21 PROCEDURE — 86901 BLOOD TYPING SEROLOGIC RH(D): CPT

## 2022-02-21 PROCEDURE — 85610 PROTHROMBIN TIME: CPT

## 2022-02-21 PROCEDURE — 99285 EMERGENCY DEPT VISIT HI MDM: CPT

## 2022-02-21 PROCEDURE — 85730 THROMBOPLASTIN TIME PARTIAL: CPT

## 2022-02-21 PROCEDURE — 99239 HOSP IP/OBS DSCHRG MGMT >30: CPT

## 2022-02-21 PROCEDURE — 45378 DIAGNOSTIC COLONOSCOPY: CPT

## 2022-02-21 PROCEDURE — U0003: CPT

## 2022-02-21 PROCEDURE — 86850 RBC ANTIBODY SCREEN: CPT

## 2022-02-21 PROCEDURE — 80053 COMPREHEN METABOLIC PANEL: CPT

## 2022-02-21 PROCEDURE — 86803 HEPATITIS C AB TEST: CPT

## 2022-02-21 PROCEDURE — 71045 X-RAY EXAM CHEST 1 VIEW: CPT

## 2022-02-21 PROCEDURE — 85025 COMPLETE CBC W/AUTO DIFF WBC: CPT

## 2022-02-21 PROCEDURE — 36415 COLL VENOUS BLD VENIPUNCTURE: CPT

## 2022-02-21 PROCEDURE — 96374 THER/PROPH/DIAG INJ IV PUSH: CPT

## 2022-02-21 PROCEDURE — G1004: CPT

## 2022-02-21 PROCEDURE — U0005: CPT

## 2022-02-21 PROCEDURE — 86900 BLOOD TYPING SEROLOGIC ABO: CPT

## 2022-02-21 PROCEDURE — 93005 ELECTROCARDIOGRAM TRACING: CPT

## 2022-02-21 RX ORDER — SODIUM CHLORIDE 9 MG/ML
1000 INJECTION, SOLUTION INTRAVENOUS
Refills: 0 | Status: DISCONTINUED | OUTPATIENT
Start: 2022-02-21 | End: 2022-02-21

## 2022-02-21 RX ADMIN — PANTOPRAZOLE SODIUM 40 MILLIGRAM(S): 20 TABLET, DELAYED RELEASE ORAL at 05:17

## 2022-02-21 NOTE — DISCHARGE NOTE PROVIDER - CARE PROVIDER_API CALL
Greg Redman)  Gastroenterology; Internal Medicine  [address], [address]  [place] [address]  Phone: [phone]  Fax: [phone]  Follow Up Time:     PCP,   Phone: (   )    -  Fax: (   )    -  Follow Up Time:

## 2022-02-21 NOTE — PROGRESS NOTE ADULT - ASSESSMENT
70yo M with pmh of SAH(2018), HTN, HLD, Hypothyroid presented to ED c/o rectal bleeding.    GIB  likely colonic diverticular bleed   CTA with Active site of hemorrhage at junction descending and sigmoid colon  Colonoscopy this AM: Multiple non bleeding diverticula seen in the descending colon and sigmoid colon, few non bleeding diverticula seen in the asc colon as well. No stigmata of recent/active bleeding  H/H still trending down  Monitor H/H q8 hrs   hemodynamically stable at this time   Transfuse if <8 or symptomatic  cont with protonic 40mg IV BID   Appreciate GI consult     HTN  hold home bp meds in the setting of acute bleed   resume meds as needed     HLD  cont atorvastatin 40mg po daily     Hypothyroid  cont synthroid     DVT   no AC due to active bleeding         Dispo: If H/H stable and no further bleed, likely DC in AM 
72yo M with pmh of SAH(2018), HTN, HLD, Hypothyroid presented to ED c/o rectal bleeding.    GIB  likely colonic diverticular bleed   CTA with Active site of hemorrhage at junction descending and sigmoid colon  H/H slowly trending down  Monitor H/H q8 hrs   hemodynamically stable at this time   Transfuse if <8 or symptomatic  cont with protonic 40mg IV BID   Appreciate GI consult, plan for colonoscopy in AM     HTN  hold home bp meds in the setting of acute bleed   resume meds as needed     HLD  cont atorvastatin 40mg po daily     Hypothyroid  cont synthroid     DVT   no AC due to active bleeding

## 2022-02-21 NOTE — DISCHARGE NOTE PROVIDER - HOSPITAL COURSE
72yo M with pmh of SAH(2018), HTN, HLD, Hypothyroid presented to ED c/o rectal bleeding. CTA showed with Active site of hemorrhage at junction descending and sigmoid colon. GI was consulted and recommended Colonoscopy. Colonoscopy done this AM: Multiple non bleeding diverticula seen in the descending colon and sigmoid colon, few non bleeding diverticula seen in the asc colon as well. No stigmata of recent/active bleeding. Per GI pt can advance diet as tolerated. His H/H was trending down but is stable today. It was recommended to the pt that he stay another day for monitoring but pt adamant about discharge today. Detailed instructions were given to the pt and pt will also need close follow up with PCP and GI.     DC diagnosis:   GIB - likely colonic diverticular bleed   HTN    Vital Signs Last 24 Hrs  T(C): 36.7 (21 Feb 2022 16:53), Max: 36.8 (21 Feb 2022 07:19)  T(F): 98.1 (21 Feb 2022 16:53), Max: 98.2 (21 Feb 2022 07:19)  HR: 90 (21 Feb 2022 16:53) (66 - 90)  BP: 120/68 (21 Feb 2022 16:53) (103/59 - 162/93)  BP(mean): --  RR: 20 (21 Feb 2022 16:53) (11 - 20)  SpO2: 98% (21 Feb 2022 16:53) (94% - 100%)    Physical Exam:  CONSTITUTIONAL: NAD, sitting up in bed  ENMT: Moist oral mucosa, PERRLA, EOMI   RESPIRATORY: Normal respiratory effort; lungs are clear to auscultation bilaterally  CARDIOVASCULAR: Regular rate and rhythm, normal S1 and S2, No lower extremity edema  ABDOMEN: Nontender to palpation, normoactive bowel sounds  MUSCULOSKELETAL:  No clubbing or cyanosis of digits  PSYCH: A+O to person, place, and time; affect appropriate  NEUROLOGY: No gross sensory or motor deficits   SKIN: No rashes; no palpable lesions    DC time: 46 mins

## 2022-02-21 NOTE — DISCHARGE NOTE PROVIDER - NSDCMRMEDTOKEN_GEN_ALL_CORE_FT
amLODIPine 10 mg oral tablet: 1 tab(s) orally once a day  atorvastatin 40 mg oral tablet: 1 tab(s) orally once a day (at bedtime)  levothyroxine 137 mcg (0.137 mg) oral tablet: 1 tab(s) orally once a day  melatonin 5 mg oral tablet: 1 tab(s) orally once a day (at bedtime)

## 2022-02-21 NOTE — DISCHARGE NOTE PROVIDER - NSDCCPCAREPLAN_GEN_ALL_CORE_FT
PRINCIPAL DISCHARGE DIAGNOSIS  Diagnosis: GI bleed  Assessment and Plan of Treatment: You likely had a diverticular bleed  Please keep yourself hydrated   If you experience any bleeding, please seek medical help   Please follow up with your PCP and GI      SECONDARY DISCHARGE DIAGNOSES  Diagnosis: HTN (hypertension)  Assessment and Plan of Treatment: Monitor your BP   Please follow up with your PCP regarding when to restart your BP meds

## 2022-02-21 NOTE — DISCHARGE NOTE NURSING/CASE MANAGEMENT/SOCIAL WORK - PATIENT PORTAL LINK FT
You can access the FollowMyHealth Patient Portal offered by [facility] by registering at the following website: [email or web address]. By joining [facility]’s FollowMyHealth portal, you will also be able to view your health information using other applications (apps) compatible with our system.

## 2022-02-21 NOTE — DISCHARGE NOTE NURSING/CASE MANAGEMENT/SOCIAL WORK - NSDCPEFALRISK_GEN_ALL_CORE
For information on Fall & Injury Prevention, visit: [email or web address] OR  [email or web address] OR  https://www.cdc.gov/steadi/patient.html

## 2022-02-21 NOTE — PROGRESS NOTE ADULT - SUBJECTIVE AND OBJECTIVE BOX
[facility] Division of Hospital Medicine    Chief Complaint: GIB    SUBJECTIVE / OVERNIGHT EVENTS:  Pt reports no bleeding since last night     Patient denies chest pain, SOB, abd pain, N/V, fever, chills, dysuria or any other complaints. All remainder ROS negative.     MEDICATIONS  (STANDING):  atorvastatin 40 milliGRAM(s) Oral at bedtime  levothyroxine 137 MICROGram(s) Oral daily  pantoprazole  Injectable 40 milliGRAM(s) IV Push two times a day  polyethylene glycol/electrolyte Solution. 4000 milliLiter(s) Oral once    MEDICATIONS  (PRN):  acetaminophen     Tablet .. 650 milliGRAM(s) Oral every 6 hours PRN Temp greater or equal to 38C (100.4F), Mild Pain (1 - 3)  melatonin 5 milliGRAM(s) Oral at bedtime PRN Insomnia  ondansetron Injectable 4 milliGRAM(s) IV Push every 8 hours PRN Nausea and/or Vomiting        I&O's Summary      PHYSICAL EXAM:  Vital Signs Last 24 Hrs  T(C): 36.7 (20 Feb 2022 12:00), Max: 37.1 (19 Feb 2022 14:31)  T(F): 98 (20 Feb 2022 12:00), Max: 98.8 (20 Feb 2022 04:09)  HR: 86 (20 Feb 2022 12:00) (86 - 107)  BP: 162/96 (20 Feb 2022 12:00) (124/79 - 164/97)  BP(mean): --  RR: 18 (20 Feb 2022 12:00) (16 - 20)  SpO2: 95% (20 Feb 2022 12:00) (93% - 98%)        CONSTITUTIONAL: NAD, sitting up in bed  ENMT: Moist oral mucosa, PERRLA, EOMI   RESPIRATORY: Normal respiratory effort; lungs are clear to auscultation bilaterally  CARDIOVASCULAR: Regular rate and rhythm, normal S1 and S2, No lower extremity edema  ABDOMEN: Nontender to palpation, normoactive bowel sounds  MUSCULOSKELETAL:  No clubbing or cyanosis of digits  PSYCH: A+O to person, place, and time; affect appropriate  NEUROLOGY: No gross sensory or motor deficits   SKIN: No rashes; no palpable lesions      LABS:                        11.4   8.09  )-----------( 179      ( 20 Feb 2022 02:51 )             34.4     02-20    139  |  103  |  18.4  ----------------------------<  106<H>  3.8   |  24.0  |  0.70    Ca    8.4<L>      20 Feb 2022 02:51    TPro  6.4<L>  /  Alb  4.2  /  TBili  0.4  /  DBili  x   /  AST  29  /  ALT  27  /  AlkPhos  75  02-19    PT/INR - ( 19 Feb 2022 15:20 )   PT: 12.1 sec;   INR: 1.05 ratio         PTT - ( 19 Feb 2022 15:20 )  PTT:31.7 sec    
[facility] Division of Hospital Medicine    Chief Complaint: GIB    SUBJECTIVE / OVERNIGHT EVENTS:  Pt reports no bleeding   s/p colonoscopy this morning   Wants to eat regular food     Patient denies chest pain, SOB, abd pain, N/V, fever, chills, dysuria or any other complaints. All remainder ROS negative.     MEDICATIONS  (STANDING):  atorvastatin 40 milliGRAM(s) Oral at bedtime  levothyroxine 137 MICROGram(s) Oral daily  pantoprazole  Injectable 40 milliGRAM(s) IV Push two times a day    MEDICATIONS  (PRN):  acetaminophen     Tablet .. 650 milliGRAM(s) Oral every 6 hours PRN Temp greater or equal to 38C (100.4F), Mild Pain (1 - 3)  melatonin 5 milliGRAM(s) Oral at bedtime PRN Insomnia  ondansetron Injectable 4 milliGRAM(s) IV Push every 8 hours PRN Nausea and/or Vomiting        I&O's Summary      PHYSICAL EXAM:  Vital Signs Last 24 Hrs  T(C): 36.4 (21 Feb 2022 10:02), Max: 36.8 (21 Feb 2022 07:19)  T(F): 97.6 (21 Feb 2022 10:02), Max: 98.2 (21 Feb 2022 07:19)  HR: 73 (21 Feb 2022 10:30) (66 - 92)  BP: 136/69 (21 Feb 2022 10:30) (103/59 - 162/96)  BP(mean): --  RR: 16 (21 Feb 2022 10:30) (11 - 18)  SpO2: 100% (21 Feb 2022 10:30) (94% - 100%)        CONSTITUTIONAL: NAD, sitting up in bed  ENMT: Moist oral mucosa, PERRLA, EOMI   RESPIRATORY: Normal respiratory effort; lungs are clear to auscultation bilaterally  CARDIOVASCULAR: Regular rate and rhythm, normal S1 and S2, No lower extremity edema  ABDOMEN: Nontender to palpation, normoactive bowel sounds  MUSCULOSKELETAL:  No clubbing or cyanosis of digits  PSYCH: A+O to person, place, and time; affect appropriate  NEUROLOGY: No gross sensory or motor deficits   SKIN: No rashes; no palpable lesions      LABS:                        9.5    5.48  )-----------( 155      ( 21 Feb 2022 07:47 )             28.7     02-21    141  |  104  |  15.4  ----------------------------<  94  3.5   |  27.0  |  0.71    Ca    8.4<L>      21 Feb 2022 07:47    TPro  6.4<L>  /  Alb  4.2  /  TBili  0.4  /  DBili  x   /  AST  29  /  ALT  27  /  AlkPhos  75  02-19    PT/INR - ( 21 Feb 2022 07:47 )   PT: 13.2 sec;   INR: 1.15 ratio         PTT - ( 21 Feb 2022 07:47 )  PTT:29.6 sec

## 2022-06-02 ENCOUNTER — APPOINTMENT (OUTPATIENT)
Dept: OPHTHALMOLOGY | Facility: CLINIC | Age: 72
End: 2022-06-02
Payer: MEDICARE

## 2022-06-02 ENCOUNTER — NON-APPOINTMENT (OUTPATIENT)
Age: 72
End: 2022-06-02

## 2022-06-02 PROCEDURE — 92004 COMPRE OPH EXAM NEW PT 1/>: CPT

## 2022-06-02 PROCEDURE — 92136 OPHTHALMIC BIOMETRY: CPT

## 2022-06-02 PROCEDURE — 92025 CPTRIZED CORNEAL TOPOGRAPHY: CPT

## 2022-06-02 PROCEDURE — 92134 CPTRZ OPH DX IMG PST SGM RTA: CPT

## 2022-06-16 PROBLEM — Z00.00 ENCOUNTER FOR PREVENTIVE HEALTH EXAMINATION: Noted: 2022-06-16

## 2022-06-22 ENCOUNTER — APPOINTMENT (OUTPATIENT)
Dept: OPHTHALMOLOGY | Facility: CLINIC | Age: 72
End: 2022-06-22
Payer: MEDICARE

## 2022-06-22 ENCOUNTER — NON-APPOINTMENT (OUTPATIENT)
Age: 72
End: 2022-06-22

## 2022-06-22 PROCEDURE — 92250 FUNDUS PHOTOGRAPHY W/I&R: CPT

## 2022-06-22 PROCEDURE — 92014 COMPRE OPH EXAM EST PT 1/>: CPT

## 2022-07-14 PROBLEM — Z78.9 OTHER SPECIFIED HEALTH STATUS: Chronic | Status: INACTIVE | Noted: 2018-03-12 | Resolved: 2022-02-19

## 2022-07-14 PROBLEM — I10 ESSENTIAL (PRIMARY) HYPERTENSION: Chronic | Status: ACTIVE | Noted: 2022-02-19

## 2022-07-14 PROBLEM — E03.9 HYPOTHYROIDISM, UNSPECIFIED: Chronic | Status: ACTIVE | Noted: 2022-02-20

## 2022-07-14 PROBLEM — E78.5 HYPERLIPIDEMIA, UNSPECIFIED: Chronic | Status: ACTIVE | Noted: 2022-02-19

## 2022-07-14 PROBLEM — I60.9 NONTRAUMATIC SUBARACHNOID HEMORRHAGE, UNSPECIFIED: Chronic | Status: ACTIVE | Noted: 2022-02-20

## 2022-09-11 NOTE — OPERATIVE REPORT - OPERATIVE RPOSRT DETAILS
DATE OF SURGERY:9-14-22    Surgeon:  Chelsea Saeed    PRE-OP DIAGNOSIS: Cataract Right Eye    POST-OP DIAGNOSIS: Same    ANESTHESIA: MAC    PROCEDURE: Cataract extraction with intraocular lens implant Right eye    SPECIMEN/TISSUE REMOVED: None    ESTIMATED BLOOD LOSS: < 1mL    COMPLICATIONS: None    The patient was brought to the operating room.  A drop of topical anesthetic was placed in the eye. The patient was prepped and draped in the usual sterile fashion, including Betadine drops in the eye. A lid speculum was placed between the lids of the right eye. A paracentesis was made superiorly. Intracameral preservative free lidocaine was instilled and the anterior chamber was filled with air, trypan blue, and viscoelastic. A clear cornea temporal incision was created using a 2.4mm keratome. A continuous curvilinear capsulorhexis was started with a cystotome and completed with capsulorhexis forceps. The lens was hydrodissected with BSS. The lens was then phacoemulsified using a divide and conquer technique. Residual cortical material was removed using automated irrigation and aspiration. The capsular bag was reformed using a viscoelastic. A [id number] 24.00 lens was inserted into the bag. Symmetric capsular bag fixation was confirmed. The remaining viscoelastic was removed with automated irrigation and aspiration. The wounds were hydrated and checked to be water tight. The lid speculum was removed. Antibiotic/steroid ointment was instilled in the eye and a shield was placed over the eye. The patient left the OR in stable condition having tolerated the procedure well. Chelsea LEONARD was present throughout the case.

## 2022-09-13 RX ORDER — SODIUM CHLORIDE 9 MG/ML
1000 INJECTION, SOLUTION INTRAVENOUS
Refills: 0 | Status: DISCONTINUED | OUTPATIENT
Start: 2022-09-14 | End: 2022-09-14

## 2022-09-13 NOTE — ASU PATIENT PROFILE, ADULT - PACKS YRS CALCULATION
Spoke with the patient and with Libra in [facility] Sched[facility]. He is now rescheduled to 1/10/20 at 1000 with 0900 arrival. He denies needing anything further at this time.       60

## 2022-09-13 NOTE — ASU PATIENT PROFILE, ADULT - NSICDXPASTMEDICALHX_GEN_ALL_CORE_FT
PAST MEDICAL HISTORY:  GI bleed 2 years ago    Hyperlipidemia     Hypertension     Hypothyroid     SAH (subarachnoid hemorrhage)

## 2022-09-13 NOTE — ASU PATIENT PROFILE, ADULT - FALL HARM RISK - RISK INTERVENTIONS

## 2022-09-14 ENCOUNTER — NON-APPOINTMENT (OUTPATIENT)
Age: 72
End: 2022-09-14

## 2022-09-14 ENCOUNTER — OUTPATIENT (OUTPATIENT)
Dept: OUTPATIENT SERVICES | Facility: HOSPITAL | Age: 72
LOS: 1 days | Discharge: HOME | End: 2022-09-14

## 2022-09-14 ENCOUNTER — APPOINTMENT (OUTPATIENT)
Dept: OPHTHALMOLOGY | Facility: AMBULATORY SURGERY CENTER | Age: 72
End: 2022-09-14

## 2022-09-14 ENCOUNTER — TRANSCRIPTION ENCOUNTER (OUTPATIENT)
Age: 72
End: 2022-09-14

## 2022-09-14 VITALS
OXYGEN SATURATION: 96 % | RESPIRATION RATE: 16 BRPM | WEIGHT: 229.28 LBS | HEART RATE: 78 BPM | HEIGHT: 71 IN | DIASTOLIC BLOOD PRESSURE: 82 MMHG | SYSTOLIC BLOOD PRESSURE: 152 MMHG | TEMPERATURE: 99 F

## 2022-09-14 VITALS
HEART RATE: 88 BPM | SYSTOLIC BLOOD PRESSURE: 128 MMHG | TEMPERATURE: 98 F | RESPIRATION RATE: 16 BRPM | DIASTOLIC BLOOD PRESSURE: 55 MMHG | OXYGEN SATURATION: 96 %

## 2022-09-14 DIAGNOSIS — Z98.890 OTHER SPECIFIED POSTPROCEDURAL STATES: Chronic | ICD-10-CM

## 2022-09-14 PROCEDURE — 66984 XCAPSL CTRC RMVL W/O ECP: CPT | Mod: RT

## 2022-09-14 DEVICE — LENS IOL ACRYSOF SN60WF 24.0D
Type: IMPLANTABLE DEVICE | Site: RIGHT | Status: NON-FUNCTIONAL
Removed: 2022-09-14

## 2022-09-14 RX ORDER — TROPICAMIDE 1 %
1 DROPS OPHTHALMIC (EYE)
Refills: 0 | Status: COMPLETED | OUTPATIENT
Start: 2022-09-14 | End: 2022-09-14

## 2022-09-14 RX ORDER — CYCLOPENTOLATE HYDROCHLORIDE 10 MG/ML
1 SOLUTION/ DROPS OPHTHALMIC
Refills: 0 | Status: COMPLETED | OUTPATIENT
Start: 2022-09-14 | End: 2022-09-14

## 2022-09-14 RX ORDER — PHENYLEPHRINE HCL 2.5 %
1 DROPS OPHTHALMIC (EYE)
Refills: 0 | Status: COMPLETED | OUTPATIENT
Start: 2022-09-14 | End: 2022-09-14

## 2022-09-14 RX ORDER — OFLOXACIN 0.3 %
1 DROPS OPHTHALMIC (EYE)
Refills: 0 | Status: COMPLETED | OUTPATIENT
Start: 2022-09-14 | End: 2022-09-14

## 2022-09-14 RX ORDER — KETOROLAC TROMETHAMINE 0.5 %
1 DROPS OPHTHALMIC (EYE)
Refills: 0 | Status: COMPLETED | OUTPATIENT
Start: 2022-09-14 | End: 2022-09-14

## 2022-09-14 RX ADMIN — Medication 1 DROP(S): at 10:49

## 2022-09-14 RX ADMIN — Medication 1 DROP(S): at 10:54

## 2022-09-14 RX ADMIN — CYCLOPENTOLATE HYDROCHLORIDE 1 DROP(S): 10 SOLUTION/ DROPS OPHTHALMIC at 10:49

## 2022-09-14 RX ADMIN — Medication 1 DROP(S): at 10:48

## 2022-09-14 RX ADMIN — CYCLOPENTOLATE HYDROCHLORIDE 1 DROP(S): 10 SOLUTION/ DROPS OPHTHALMIC at 10:54

## 2022-09-14 RX ADMIN — Medication 1 DROP(S): at 11:00

## 2022-09-14 RX ADMIN — CYCLOPENTOLATE HYDROCHLORIDE 1 DROP(S): 10 SOLUTION/ DROPS OPHTHALMIC at 11:00

## 2022-09-14 NOTE — ASU DISCHARGE PLAN (ADULT/PEDIATRIC) - NS MD DC FALL RISK RISK
For information on Fall & Injury Prevention, visit: [email or web address] OR  [email or web address] OR  https://www.cdc.gov/steadi/patient.html

## 2022-09-15 ENCOUNTER — APPOINTMENT (OUTPATIENT)
Dept: OPHTHALMOLOGY | Facility: CLINIC | Age: 72
End: 2022-09-15

## 2022-09-15 ENCOUNTER — NON-APPOINTMENT (OUTPATIENT)
Age: 72
End: 2022-09-15

## 2022-09-15 PROBLEM — K92.2 GASTROINTESTINAL HEMORRHAGE, UNSPECIFIED: Chronic | Status: ACTIVE | Noted: 2022-09-13

## 2022-09-15 PROCEDURE — 99024 POSTOP FOLLOW-UP VISIT: CPT

## 2022-09-21 RX ORDER — SODIUM CHLORIDE 9 MG/ML
1000 INJECTION, SOLUTION INTRAVENOUS
Refills: 0 | Status: DISCONTINUED | OUTPATIENT
Start: 2022-09-28 | End: 2022-09-28

## 2022-09-22 ENCOUNTER — APPOINTMENT (OUTPATIENT)
Dept: OPHTHALMOLOGY | Facility: CLINIC | Age: 72
End: 2022-09-22

## 2022-09-22 ENCOUNTER — NON-APPOINTMENT (OUTPATIENT)
Age: 72
End: 2022-09-22

## 2022-09-22 PROCEDURE — 92012 INTRM OPH EXAM EST PATIENT: CPT | Mod: 24

## 2022-09-26 NOTE — OPERATIVE REPORT - OPERATIVE RPOSRT DETAILS
DATE OF SURGERY:9-28-22    Surgeon:  Chelsea Saeed    PRE-OP DIAGNOSIS: Cataract Left Eye    POST-OP DIAGNOSIS: Same    ANESTHESIA: MAC    PROCEDURE: Cataract extraction with intraocular lens implant left eye    SPECIMEN/TISSUE REMOVED: None    ESTIMATED BLOOD LOSS: < 1mL    COMPLICATIONS: None    The patient was brought to the operating room.  A drop of topical anesthetic was placed in the eye. The patient was prepped and draped in the usual sterile fashion, including Betadine drops in the eye. A lid speculum was placed between the lids of the left eye. A paracentesis was made inferior. Intracameral preservative free lidocaine was instilled and the anterior chamber was filled with air, trypan blue, and viscoelastic.. A clear cornea temporal incision was created using a 2.4mm keratome. A continuous curvilinear capsulorhexis was started with a cystotome and completed with capsulorhexis forceps. The lens was hydrodissected with BSS. The lens was then phacoemulsified using a divide and conquer technique. Residual cortical material was removed using automated irrigation and aspiration. The capsular bag was reformed using a viscoelastic. A ZCB00 23.50 lens was inserted into the bag. Symmetric capsular bag fixation was confirmed. The remaining viscoelastic was removed with automated irrigation and aspiration. The wounds were hydrated and checked to be water tight. The lid speculum was removed. Antibiotic/steroid ointment was instilled in the eye and a shield was placed over the eye. The patient left the OR in stable condition having tolerated the procedure well. IChelsea was present throughout the case.

## 2022-09-27 NOTE — ASU PATIENT PROFILE, ADULT - CAREGIVER
----- Message from Julio Bradshaw MD sent at 10/19/2021  1:01 PM CDT -----  Fatty liver changes on ultrasound.  Normal appearing gallbladder.  How is he?  Recommend repeat LFTs in 4 weeks.   Yes

## 2022-09-27 NOTE — ASU PATIENT PROFILE, ADULT - FALL HARM RISK - RISK INTERVENTIONS

## 2022-09-27 NOTE — ASU PATIENT PROFILE, ADULT - NSICDXPASTSURGICALHX_GEN_ALL_CORE_FT
PAST SURGICAL HISTORY:  H/O cataract extraction right    History of thyroid surgery partial thryroidectomy 47 years ago

## 2022-09-28 ENCOUNTER — NON-APPOINTMENT (OUTPATIENT)
Age: 72
End: 2022-09-28

## 2022-09-28 ENCOUNTER — OUTPATIENT (OUTPATIENT)
Dept: OUTPATIENT SERVICES | Facility: HOSPITAL | Age: 72
LOS: 1 days | Discharge: HOME | End: 2022-09-28

## 2022-09-28 ENCOUNTER — TRANSCRIPTION ENCOUNTER (OUTPATIENT)
Age: 72
End: 2022-09-28

## 2022-09-28 ENCOUNTER — APPOINTMENT (OUTPATIENT)
Dept: OPHTHALMOLOGY | Facility: AMBULATORY SURGERY CENTER | Age: 72
End: 2022-09-28

## 2022-09-28 VITALS
SYSTOLIC BLOOD PRESSURE: 133 MMHG | RESPIRATION RATE: 16 BRPM | OXYGEN SATURATION: 95 % | DIASTOLIC BLOOD PRESSURE: 60 MMHG | TEMPERATURE: 98 F | HEART RATE: 84 BPM

## 2022-09-28 VITALS
WEIGHT: 230.38 LBS | OXYGEN SATURATION: 96 % | DIASTOLIC BLOOD PRESSURE: 77 MMHG | SYSTOLIC BLOOD PRESSURE: 130 MMHG | HEART RATE: 86 BPM | HEIGHT: 71 IN | RESPIRATION RATE: 16 BRPM | TEMPERATURE: 98 F

## 2022-09-28 DIAGNOSIS — Z98.890 OTHER SPECIFIED POSTPROCEDURAL STATES: Chronic | ICD-10-CM

## 2022-09-28 DIAGNOSIS — Z98.49 CATARACT EXTRACTION STATUS, UNSPECIFIED EYE: Chronic | ICD-10-CM

## 2022-09-28 PROCEDURE — 92136 OPHTHALMIC BIOMETRY: CPT | Mod: 26,LT

## 2022-09-28 PROCEDURE — 66984 XCAPSL CTRC RMVL W/O ECP: CPT | Mod: 79,LT

## 2022-09-28 DEVICE — LENS IOL TECNIS PROTEC ZCB00 23.5D
Type: IMPLANTABLE DEVICE | Site: LEFT | Status: NON-FUNCTIONAL
Removed: 2022-09-28

## 2022-09-28 RX ORDER — TROPICAMIDE 1 %
1 DROPS OPHTHALMIC (EYE)
Refills: 0 | Status: COMPLETED | OUTPATIENT
Start: 2022-09-28 | End: 2022-09-28

## 2022-09-28 RX ORDER — PHENYLEPHRINE HCL 2.5 %
1 DROPS OPHTHALMIC (EYE)
Refills: 0 | Status: COMPLETED | OUTPATIENT
Start: 2022-09-28 | End: 2022-09-28

## 2022-09-28 RX ORDER — CYCLOPENTOLATE HYDROCHLORIDE 10 MG/ML
1 SOLUTION/ DROPS OPHTHALMIC
Refills: 0 | Status: COMPLETED | OUTPATIENT
Start: 2022-09-28 | End: 2022-09-28

## 2022-09-28 RX ORDER — ONDANSETRON 8 MG/1
4 TABLET, FILM COATED ORAL ONCE
Refills: 0 | Status: DISCONTINUED | OUTPATIENT
Start: 2022-09-28 | End: 2022-09-28

## 2022-09-28 RX ORDER — OFLOXACIN 0.3 %
1 DROPS OPHTHALMIC (EYE)
Refills: 0 | Status: COMPLETED | OUTPATIENT
Start: 2022-09-28 | End: 2022-09-28

## 2022-09-28 RX ORDER — KETOROLAC TROMETHAMINE 0.5 %
1 DROPS OPHTHALMIC (EYE)
Refills: 0 | Status: COMPLETED | OUTPATIENT
Start: 2022-09-28 | End: 2022-09-28

## 2022-09-28 RX ORDER — ACETAMINOPHEN 500 MG
650 TABLET ORAL ONCE
Refills: 0 | Status: DISCONTINUED | OUTPATIENT
Start: 2022-09-28 | End: 2022-09-28

## 2022-09-28 RX ORDER — ACETAMINOPHEN 500 MG
0 TABLET ORAL
Qty: 0 | Refills: 0 | DISCHARGE

## 2022-09-28 RX ADMIN — Medication 1 DROP(S): at 10:55

## 2022-09-28 RX ADMIN — CYCLOPENTOLATE HYDROCHLORIDE 1 DROP(S): 10 SOLUTION/ DROPS OPHTHALMIC at 10:55

## 2022-09-28 RX ADMIN — CYCLOPENTOLATE HYDROCHLORIDE 1 DROP(S): 10 SOLUTION/ DROPS OPHTHALMIC at 10:45

## 2022-09-28 RX ADMIN — Medication 1 DROP(S): at 10:45

## 2022-09-28 RX ADMIN — Medication 1 DROP(S): at 10:44

## 2022-09-28 RX ADMIN — CYCLOPENTOLATE HYDROCHLORIDE 1 DROP(S): 10 SOLUTION/ DROPS OPHTHALMIC at 10:50

## 2022-09-28 RX ADMIN — Medication 1 DROP(S): at 10:50

## 2022-09-28 RX ADMIN — Medication 1 DROP(S): at 10:49

## 2022-09-28 RX ADMIN — Medication 1 DROP(S): at 10:47

## 2022-09-28 NOTE — ASU DISCHARGE PLAN (ADULT/PEDIATRIC) - NS MD DC FALL RISK RISK
For information on Fall & Injury Prevention, visit: [email or web address] OR  [email or web address] OR  https://www.cdc.gov/steadi/patient.html

## 2022-09-28 NOTE — PRE-ANESTHESIA EVALUATION ADULT - NSANTHOSAYNRD_GEN_A_CORE
No. CONSTANCE screening performed.  STOP BANG Legend: 0-2 = LOW Risk; 3-4 = INTERMEDIATE Risk; 5-8 = HIGH Risk

## 2022-09-29 ENCOUNTER — NON-APPOINTMENT (OUTPATIENT)
Age: 72
End: 2022-09-29

## 2022-09-29 ENCOUNTER — APPOINTMENT (OUTPATIENT)
Dept: OPHTHALMOLOGY | Facility: CLINIC | Age: 72
End: 2022-09-29

## 2022-09-29 PROCEDURE — 99024 POSTOP FOLLOW-UP VISIT: CPT

## 2022-10-05 ENCOUNTER — APPOINTMENT (OUTPATIENT)
Dept: OPHTHALMOLOGY | Facility: CLINIC | Age: 72
End: 2022-10-05

## 2022-10-05 ENCOUNTER — NON-APPOINTMENT (OUTPATIENT)
Age: 72
End: 2022-10-05

## 2022-10-05 PROCEDURE — 99024 POSTOP FOLLOW-UP VISIT: CPT

## 2022-11-08 ENCOUNTER — APPOINTMENT (OUTPATIENT)
Dept: OPHTHALMOLOGY | Facility: CLINIC | Age: 72
End: 2022-11-08

## 2022-11-08 ENCOUNTER — NON-APPOINTMENT (OUTPATIENT)
Age: 72
End: 2022-11-08

## 2022-11-08 PROCEDURE — 92012 INTRM OPH EXAM EST PATIENT: CPT | Mod: 24

## 2022-11-13 NOTE — PRE-ANESTHESIA EVALUATION ADULT - ANESTHESIA, PREVIOUS REACTION, PROFILE
none Review of Systems    Constitutional: (-) fever  Cardiovascular: (-) chest pain, (-) syncope  Respiratory: (-) cough, (-) shortness of breath  Gastrointestinal: (-) vomiting, (-) diarrhea, (-) abdominal pain  Musculoskeletal: (-) neck pain, (-) back pain, (-) joint pain  Integumentary: (-) rash, (-) edema  Neurological: (-) headache, (-) altered mental status (+) L sided hearing loss, resolved.     Except as documented in the HPI, all other systems are negative.

## 2022-12-24 NOTE — ASU PATIENT PROFILE, ADULT - HISTORY OF COVID-19 VACCINATION
PA note: No obvious fracture on xrays. RUE sling applied. All radiology results discussed in detail with father & patient. Patient re-examined and re-evaluated. Patient feels much better at this time. ED evaluation, Diagnosis and management discussed with the patient in detail. Workup results discussed with ED attending, OK to dc home. Close ORTHO follow up encouraged, aftercare to assist with scheduling appointment ASAP. Strict ED return instructions discussed in detail and patient & father given the opportunity to ask any questions about their discharge diagnosis and instructions. Patient & father verbalized understanding. ~ Howard Ocampo PA-C PA: Patient is a 16 y/o female with no significant PMHx who presents to [facility] c/o right elbow injury s/p fall after she slipped, hit her head on her fridge and caught herself from falling on the ground. Denies LOC, no neck injury. ~Howard Ocampo PA-C Yes

## 2022-12-30 NOTE — DIETITIAN INITIAL EVALUATION ADULT. - 25 CAL
ASSESSMENT/PLAN    Physical exam  Well appearing. Normal exam and vitals, except for BMI.  OCPs for birth control. May desire pregnancy within next year so discussed preconception counseling including prenatal vitamins, no alcohol or smoking. Pap done today. STD screening ordered.   Screening for cervical cancer  Routine screening  - PAP ORDER  Need for vaccination  Agreed to all vaccines today.   - COVID 19 12Y AND OLDER P[employer] - DO NOT DILUTE  - INFLUENZA QUADRIVALENT SPLIT PRES FREE 0.5 ML VACC, IM (FLULAVAL,FLUARIX,FLUZONE)  - HPV 9 VALENT VACC    PCOS (polycystic ovarian syndrome)  Irregular menses  Well controlled. Continue OCPs and Metformin.  - norethindrone-ethinyl estradiol-iron (MICROGESTIN FE1.5/30) 1.5-30 MG-MCG tablet; Take 1 tablet by mouth daily.  Dispense: 84 tablet; Refill: 3  - metFORMIN (GLUCOPHAGE) 500 MG tablet; Take 1 tablet by mouth daily (with breakfast).  Dispense: 90 tablet; Refill: 3    Hidradenitis  Currently controlled, no infection. Can use topical Clindamycin PRN.  - clindamycin (CLINDAGEL) 1 % gel; Apply 1 application topically 2 times daily.  Dispense: 30 g; Refill: 1    Obesity, morbid, BMI 40.0-49.9 (CMS/HCC)  Recommend healthy diet and regular exercise.  - COMPREHENSIVE METABOLIC PANEL; Future  - GLYCOHEMOGLOBIN; Future  - LIPID PANEL WITH REFLEX; Future    Screen for STD (sexually transmitted disease)  Recommend 100% condom use to prevent STD.  - PAP ORDER  - RPR (RAPID PLASMA REAGIN) TRADITIONAL SYPHILIS SCREEN ALGORITHM; Future  - HIV 1/HIV 2 ANTIGEN/ANTIBODY SCREEN; Future    Vaginal discharge  Thick white discharge, most consistent with yeast but will check wet mount before treating.   - WET MOUNT      There are no Patient Instructions on file for this visit.    Return in about 4 weeks (around 1/27/2023) for Pfizer #2.      SUBJECTIVE    CC:   Chief Complaint   Patient presents with   • Establish Care   • Office Visit       HPI:     20 year old female presenting for  physical to establish care and follow up on chronic health issues.  No previous primary care.    Concerns today:  - Vaginal discharge and irritation. Has been washing vagina with soap and thinks that may have caused an infection. Sexually active with 1 male partner, does not use condoms, no prior STD.   - PCOS. Needs refill of OCPs and Metformin. Periods have been more regular since taking meds.   - Armpit rash/irritation. Thinks it may be caused by scented deodorant. Previously infected and had to have I&D in ER. No infection at this time but wants something to treat it.     I have reviewed the patient's medications and allergies, past medical and social history, updating these as appropriate.    Medical Hx:     Patient Active Problem List   Diagnosis   • PCOS (polycystic ovarian syndrome)       History reviewed. No pertinent surgical history.    Current Outpatient Medications   Medication Sig Dispense Refill   • norethindrone-ethinyl estradiol-iron (MICROGESTIN FE1.5/30) 1.5-30 MG-MCG tablet Take 1 tablet by mouth daily. 84 tablet 3   • clindamycin (CLINDAGEL) 1 % gel Apply 1 application topically 2 times daily. 30 g 1   • metFORMIN (GLUCOPHAGE) 500 MG tablet Take 1 tablet by mouth daily (with breakfast). 90 tablet 3     No current facility-administered medications for this visit.       ALLERGIES:  No Known Allergies    Health Maintenance   Topic Date Due   • Cervical Cancer Screening - <30 3 year  Never done   • COVID-19 Vaccine (2 - Pfizer series) 01/20/2023   • DTaP/Tdap/Td Vaccine (7 - Td or Tdap) 02/15/2023   • Depression Screening  12/30/2023   • Varicella Vaccine  Completed   • Meningococcal Vaccine  Completed   • Influenza Vaccine  Completed   • Hepatitis B Vaccine  Completed   • HPV Vaccine  Completed   • Pneumococcal Vaccine 0-64  Aged Out       Mental Health Hx:       PHQ 2:  Date Adult PHQ 2 Score Adult PHQ 2 Interpretation   12/30/2022 0 No further screening needed            Substances:    Tobacco:  never smoker   EtOH: None   Marijuana: None   Other drugs: None     Social Hx:    Lives alone. No weapons in home. Feels safe in home.    Works at [employer].     Sexual/Reproductive Hx:    Active in past year?: yes   Partners in last year: 1, male   STI protection: Inconsistent condom use   Prior STI: none   Family planning: birth control pills   Pregnancy history: G0   Plans for future pregnancy: within next year  Menses: More regular since starting OCPs             Last Pap none      ROS: Negative except as documented in HPI.      OBJECTIVE    Vitals:    12/30/22 1418 12/30/22 1445   BP: (!) 140/86 136/88   BP Location: LUE - Left upper extremity LUE - Left upper extremity   Patient Position: Sitting    Cuff Size: Large Adult    Pulse: 94    SpO2: 99%    Weight: 121.5 kg (267 lb 12.8 oz)    Height: 5' 7\" (1.702 m)    LMP: 12/24/2022       PHYSICAL EXAM    Gen: Alert and oriented. No acute distress  HEENT: Conjunctiva normal. Oropharynx moist, no erythema or exudates. No cervical lymphadenopathy. Normal TMs and canals.   CV: Regular rate and rhythm. No murmurs.  Pulm: Clear to auscultation bilaterally. Normal respiratory effort.   Abd: Soft, nontender, non-distended. Normal bowel sounds.  : External genitalia without erythema, exudate or lesions. Vaginal vault with white discharge. Cervix is of normal color without lesion. The os is closed. There is no bleeding noted.  No cervical motion tenderness is seen.  Extremities: No edema.      DO Zeenat Herman [facility]   [id number]

## 2023-01-11 NOTE — ASU PATIENT PROFILE, ADULT - MEDICATION ADMINISTRATION INFO, PROFILE
no concerns Zoryve Counseling:  I discussed with the patient that Zoryve is not for use in the eyes, mouth or vagina. The most commonly reported side effects include diarrhea, headache, insomnia, application site pain, upper respiratory tract infections, and urinary tract infections.  All of the patient's questions and concerns were addressed.

## 2023-01-25 NOTE — SPEECH LANGUAGE PATHOLOGY EVALUATION - SLP GENERAL OBSERVATIONS
PA submitted VIA CM for  Rybelsus 7MG tablets Key: [id number] PENDING    Your request has been denied Pt alert awake Ox3 (grossly for time) +aphasic

## 2023-03-22 NOTE — PRE-ANESTHESIA EVALUATION ADULT - NSATTENDATTESTRD_GEN_ALL_CORE
The patient has been re-examined and I agree with the above assessment or I updated with my findings. Complex Repair And Ftsg Text: The defect edges were debeveled with a #15 scalpel blade.  The primary defect was closed partially with a complex linear closure.  Given the location of the defect, shape of the defect and the proximity to free margins a full thickness skin graft was deemed most appropriate to repair the remaining defect.  The graft was trimmed to fit the size of the remaining defect.  The graft was then placed in the primary defect, oriented appropriately, and sutured into place.

## 2023-04-17 ENCOUNTER — APPOINTMENT (OUTPATIENT)
Dept: INTERNAL MEDICINE | Facility: CLINIC | Age: 73
End: 2023-04-17

## 2023-05-08 ENCOUNTER — APPOINTMENT (OUTPATIENT)
Dept: INTERNAL MEDICINE | Facility: CLINIC | Age: 73
End: 2023-05-08
Payer: MEDICARE

## 2023-05-08 VITALS
HEART RATE: 87 BPM | SYSTOLIC BLOOD PRESSURE: 167 MMHG | HEIGHT: 71 IN | DIASTOLIC BLOOD PRESSURE: 91 MMHG | BODY MASS INDEX: 32.76 KG/M2 | WEIGHT: 234 LBS

## 2023-05-08 PROCEDURE — 36415 COLL VENOUS BLD VENIPUNCTURE: CPT

## 2023-05-08 PROCEDURE — 99204 OFFICE O/P NEW MOD 45 MIN: CPT | Mod: 25

## 2023-05-11 ENCOUNTER — NON-APPOINTMENT (OUTPATIENT)
Age: 73
End: 2023-05-11

## 2023-05-11 LAB
A PHAGOCYTOPH IGG TITR SER IF: NORMAL TITER
B BURGDOR AB SER QL IA: NEGATIVE
B BURGDOR AB SER-IMP: NEGATIVE
B BURGDOR IGG+IGM SER QL: 0.04 INDEX
B MICROTI IGG TITR SER: NORMAL TITER
E CHAFFEENSIS IGG TITR SER IF: NORMAL TITER

## 2023-05-11 NOTE — HISTORY OF PRESENT ILLNESS
[FreeTextEntry1] : OFF BALANCE AND BACK PAIN [de-identified] : 74YO HERE FOR FIRST VISIT PMH HTN HLD HYPOTHYROIDISM WHO HAS BEEN FOLLOWED BY CARDIOLOGIST/INTERNIST IN [facility] FOR MANY YEARS BUT DOCTOR IS RETIRING AND HAS BEEN LOOKING FOR NEW MD\par PT REPORTS HE HAD A FALL ABOUT 5 YEARS AGO  ADN HAD  SUBSEQUENT ISSUES WITH BALANCE  AFTER INJURY BUT AFTER A PERIOD TIME IT RESOLVED \par PT NOW WITH ISSUES WITH FEELING OFF BALANCE AND  HAS BACK PAIN  AN DIFFICULTY WALKING  UNABLE TO EXERCISE AND HAS SEEN PAIN MANAGEMENT AND NEUROLOGY  IN [facility] \par

## 2023-05-11 NOTE — PLAN
[FreeTextEntry1] : 74YO HERE FOR FIRST VISIT PMH HTN HLD HYPOTHYROIDISM WHO HAS BEEN FOLLOWED BY CARDIOLOGIST/INTERNIST IN [facility] FOR MANY YEARS BUT DOCTOR IS RETIRING AND HAS BEEN LOOKING FOR NEW MD\par PT REPORTS HE HAD A FALL ABOUT 5 YEARS AGO  ADN HAD  SUBSEQUENT ISSUES WITH BALANCE  AFTER INJURY BUT AFTER A PERIOD TIME IT RESOLVED \par PT NOW WITH ISSUES WITH FEELING OFF BALANCE AND  HAS BACK PAIN  AN DIFFICULTY WALKING  UNABLE TO EXERCISE AND HAS SEEN PAIN MANAGEMENT AND NEUROLOGY  IN [facility] \par NO FEVERS CHILL  NIGHTT SEATS OR  WT LOSS \par NO RECENT TRAVEL\par DOES LIVE IN [place] AND HAS DEER NEAR PROPERTY DOES NOT RECAL BEING BITTEN BY TICK\par WILL CEHCK  LYME AND TICK BORNE ILLNESS PANEL\par WILL  NEED TO OBTAIN OLD RECORDS ESPECIALLY IMAGING OF HEAD AND BACK\par WILL D/W ENT AS PT MAY BENEFIT FORM EVAL FROM ENT PERSPECTIVE ON BALANCE\par MAY NEED TO SEE ORTHOPEDIC OR SPINE SURGEON \par PT MAY HAVE TWO SEPARATE ETIOLOGIES  OF DYSEQUILIBRIUM AND SEPARATE BACK ISSUES  \par WILL AWAIT RECORDS AND CALL PATIENT\par

## 2023-06-01 ENCOUNTER — APPOINTMENT (OUTPATIENT)
Dept: OTOLARYNGOLOGY | Facility: CLINIC | Age: 73
End: 2023-06-01
Payer: MEDICARE

## 2023-06-01 VITALS — WEIGHT: 238 LBS | HEIGHT: 71 IN | BODY MASS INDEX: 33.32 KG/M2

## 2023-06-01 DIAGNOSIS — Z85.850 PERSONAL HISTORY OF MALIGNANT NEOPLASM OF THYROID: ICD-10-CM

## 2023-06-01 DIAGNOSIS — Z78.9 OTHER SPECIFIED HEALTH STATUS: ICD-10-CM

## 2023-06-01 DIAGNOSIS — Z86.79 PERSONAL HISTORY OF OTHER DISEASES OF THE CIRCULATORY SYSTEM: ICD-10-CM

## 2023-06-01 DIAGNOSIS — Z86.39 PERSONAL HISTORY OF OTHER ENDOCRINE, NUTRITIONAL AND METABOLIC DISEASE: ICD-10-CM

## 2023-06-01 PROCEDURE — 99204 OFFICE O/P NEW MOD 45 MIN: CPT

## 2023-06-01 RX ORDER — LEVOTHYROXINE SODIUM 137 UG/1
TABLET ORAL
Refills: 0 | Status: ACTIVE | COMMUNITY

## 2023-06-01 RX ORDER — ATORVASTATIN CALCIUM 80 MG/1
TABLET, FILM COATED ORAL
Refills: 0 | Status: ACTIVE | COMMUNITY

## 2023-06-01 RX ORDER — AMLODIPINE BESYLATE 5 MG/1
TABLET ORAL
Refills: 0 | Status: ACTIVE | COMMUNITY

## 2023-06-01 NOTE — CONSULT LETTER
[Dear  ___] : Dear  [unfilled], [Consult Letter:] : I had the pleasure of evaluating your patient, [unfilled]. [Please see my note below.] : Please see my note below. [Consult Closing:] : Thank you very much for allowing me to participate in the care of this patient.  If you have any questions, please do not hesitate to contact me. [Sincerely,] : Sincerely, [DrBrandy  ___] : Dr. HOPE [FreeTextEntry2] : Benjamin Marquez MD

## 2023-06-01 NOTE — HISTORY OF PRESENT ILLNESS
[de-identified] : 73 year old man, referred by PCP, Dr. Benjamin Marquez, for balance issues\par States symptoms presented for the past year\par History of fall injury 5 years ago (2018) - subsequent brain injury, subdural hematoma and numerous fractures - extensive rehab and physical therapy, learning to walk again - no issues with balance until this past year\par Has seen Neurology - s/p EMG 11/15/22 showing mild generalized lower extremity sensorimotor neuropathy\par No associated symptoms - wears hearing aids, denies hearing changes - last audio 6 months ago\par Continues to do physical therapy 4x/week - ambulates with cane\par Denies dizziness, vertigo, nausea, vomiting, vision changes, otalgia, otorrhea, tinnitus, headaches related to ears, recent fevers or ear infections\par Imaging study of head/brain pending 6/19/23

## 2023-06-01 NOTE — PHYSICAL EXAM
[Binocular Microscopic Exam] : Binocular microscopic exam was performed [Rinne Test Air Conduction Persists > Bone Conduction Right] : air conduction greater than bone conduction on the right [Rinne Test Air Conduction Persists > Bone Conduction Left] : air conduction greater than bone conduction on the left [Hearing Georges Test (Tuning Fork On Forehead)] : no lateralization of tone [Midline] : trachea located in midline position [Normal] : no rashes [Hearing Loss Right Only] : normal [Hearing Loss Left Only] : normal [Nystagmus] : ~T no ~M nystagmus was seen [Fukuda Step Test] : Fukuda Step Test was Positive [Romberg's Sign] : Romberg's sign was present [Fistula Sign] : Fistula Sign: Negative [Past-Pointing] : Past-Pointing: Negative [Hyun-Hall[place]oke] : [place]-Hallpike: Negative

## 2023-06-01 NOTE — PROCEDURE
[Other ___] : [unfilled] [Same] : same as the Pre Op Dx. [] : Binocular Microscopy [FreeTextEntry1] : imbalance [FreeTextEntry4] : none [FreeTextEntry6] : Operative microscope was used to examine the ear canal, ear drum and visible middle landmarks.  Adequate exam would not have been possible without the use of a microscope.  Findings are described.

## 2023-06-06 DIAGNOSIS — F41.9 ANXIETY DISORDER, UNSPECIFIED: ICD-10-CM

## 2023-06-06 RX ORDER — DIAZEPAM 5 MG/1
5 TABLET ORAL AS DIRECTED
Qty: 2 | Refills: 0 | Status: ACTIVE | COMMUNITY
Start: 2023-06-06 | End: 1900-01-01

## 2023-06-09 ENCOUNTER — APPOINTMENT (OUTPATIENT)
Dept: INTERNAL MEDICINE | Facility: CLINIC | Age: 73
End: 2023-06-09
Payer: MEDICARE

## 2023-06-09 VITALS
OXYGEN SATURATION: 99 % | WEIGHT: 235 LBS | SYSTOLIC BLOOD PRESSURE: 156 MMHG | HEIGHT: 71 IN | HEART RATE: 82 BPM | BODY MASS INDEX: 32.9 KG/M2 | DIASTOLIC BLOOD PRESSURE: 90 MMHG

## 2023-06-09 DIAGNOSIS — H90.3 SENSORINEURAL HEARING LOSS, BILATERAL: ICD-10-CM

## 2023-06-09 PROCEDURE — 99215 OFFICE O/P EST HI 40 MIN: CPT

## 2023-06-09 NOTE — END OF VISIT
Problem: Falls - Risk of:  Goal: Will remain free from falls  Description: Will remain free from falls  Outcome: Ongoing  Note: Pt encouraged to call out when in need. Call light and bedside table within reach . Bed rails up 3/4 wheels locked with bed in lowest position. Non skids on with bed alarm engaged. Pt verbalizes understanding , will continue To monitor. [Time Spent: ___ minutes] : I have spent [unfilled] minutes of time on the encounter.

## 2023-06-09 NOTE — PLAN
[FreeTextEntry1] : 74YO HERE FOR SECOND   VISIT PMH HTN HLD HYPOTHYROIDISM WHO HAS BEEN FOLLOWED BY CARDIOLOGIST/INTERNIST IN [facility] FOR MANY YEARS BUT DOCTOR IS RETIRING AND HAS BEEN LOOKING FOR NEW MD\par PT REPORTS HE HAD A FALL ABOUT 5 YEARS AGO  ADN HAD  SUBSEQUENT ISSUES WITH BALANCE  AFTER INJURY BUT AFTER A PERIOD TIME IT RESOLVED \par PT NOW WITH ISSUES WITH FEELING OFF BALANCE AND  HAS BACK PAIN  AN DIFFICULTY WALKING  UNABLE TO EXERCISE AND HAS SEEN PAIN MANAGEMENT AND NEUROLOGY  IN [facility] \par HAS SEEN NEUROLOGY AND ENT HERE FOR FOLLOWUP \par LONG DISCUSSION WIT PT \par AS ABOVE AHD FALL AND HEAD TRAUMA PT FEEL SHE REOVERED FORM THAT BUT  INPAST 18-24 MONTHS HE FEELS THER HAS BEEN A DECLINE  IN HIS GAIT AND BALANCE\par SAW NERUOLOGY WHO FELT POSSIBEL HE HAD ANOTHER STROKE IN BETWEEN\par 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

## 2023-06-09 NOTE — HISTORY OF PRESENT ILLNESS
[FreeTextEntry1] : FOLLOWUP  ON DYSEQUILIBRIUM HAS SEEN NEURO AND ENT  [de-identified] : 72YO HERE FOR SECOND   VISIT PMH HTN HLD HYPOTHYROIDISM WHO HAS BEEN FOLLOWED BY CARDIOLOGIST/INTERNIST IN [facility] FOR MANY YEARS BUT DOCTOR IS RETIRING AND HAS BEEN LOOKING FOR NEW MD\par PT REPORTS HE HAD A FALL ABOUT 5 YEARS AGO  ADN HAD  SUBSEQUENT ISSUES WITH BALANCE  AFTER INJURY BUT AFTER A PERIOD TIME IT RESOLVED \par PT NOW WITH ISSUES WITH FEELING OFF BALANCE AND  HAS BACK PAIN  AN DIFFICULTY WALKING  UNABLE TO EXERCISE AND HAS SEEN PAIN MANAGEMENT AND NEUROLOGY  IN [facility] \par HAS SEEN NEUROLOGY AND ENT HERE FOR FOLLOWUP \par

## 2023-06-21 ENCOUNTER — NON-APPOINTMENT (OUTPATIENT)
Age: 73
End: 2023-06-21

## 2023-07-12 ENCOUNTER — APPOINTMENT (OUTPATIENT)
Dept: OTOLARYNGOLOGY | Facility: CLINIC | Age: 73
End: 2023-07-12
Payer: MEDICARE

## 2023-07-12 PROCEDURE — 99441: CPT | Mod: 95

## 2023-07-17 ENCOUNTER — NON-APPOINTMENT (OUTPATIENT)
Age: 73
End: 2023-07-17

## 2023-08-02 ENCOUNTER — APPOINTMENT (OUTPATIENT)
Dept: FAMILY MEDICINE | Facility: CLINIC | Age: 73
End: 2023-08-02
Payer: MEDICARE

## 2023-08-02 ENCOUNTER — NON-APPOINTMENT (OUTPATIENT)
Age: 73
End: 2023-08-02

## 2023-08-02 VITALS — DIASTOLIC BLOOD PRESSURE: 94 MMHG | SYSTOLIC BLOOD PRESSURE: 152 MMHG

## 2023-08-02 VITALS
SYSTOLIC BLOOD PRESSURE: 158 MMHG | WEIGHT: 235 LBS | OXYGEN SATURATION: 98 % | DIASTOLIC BLOOD PRESSURE: 88 MMHG | BODY MASS INDEX: 32.9 KG/M2 | HEART RATE: 78 BPM | HEIGHT: 71 IN

## 2023-08-02 DIAGNOSIS — E78.5 HYPERLIPIDEMIA, UNSPECIFIED: ICD-10-CM

## 2023-08-02 DIAGNOSIS — R91.1 SOLITARY PULMONARY NODULE: ICD-10-CM

## 2023-08-02 DIAGNOSIS — Z00.00 ENCOUNTER FOR GENERAL ADULT MEDICAL EXAMINATION W/OUT ABNORMAL FINDINGS: ICD-10-CM

## 2023-08-02 PROCEDURE — 99215 OFFICE O/P EST HI 40 MIN: CPT

## 2023-08-03 LAB
ALBUMIN SERPL ELPH-MCNC: 4.4 G/DL
ALP BLD-CCNC: 70 U/L
ALT SERPL-CCNC: 35 U/L
ANION GAP SERPL CALC-SCNC: 13 MMOL/L
AST SERPL-CCNC: 27 U/L
BILIRUB SERPL-MCNC: 0.5 MG/DL
BUN SERPL-MCNC: 17 MG/DL
CALCIUM SERPL-MCNC: 9.7 MG/DL
CHLORIDE SERPL-SCNC: 105 MMOL/L
CHOLEST SERPL-MCNC: 173 MG/DL
CO2 SERPL-SCNC: 22 MMOL/L
CREAT SERPL-MCNC: 0.79 MG/DL
EGFR: 93 ML/MIN/1.73M2
GLUCOSE SERPL-MCNC: 91 MG/DL
HDLC SERPL-MCNC: 87 MG/DL
LDLC SERPL CALC-MCNC: 79 MG/DL
NONHDLC SERPL-MCNC: 87 MG/DL
POTASSIUM SERPL-SCNC: 4.4 MMOL/L
PROT SERPL-MCNC: 6.4 G/DL
SODIUM SERPL-SCNC: 140 MMOL/L
T PALLIDUM AB SER QL IA: NEGATIVE
T4 FREE SERPL-MCNC: 1.6 NG/DL
TRIGL SERPL-MCNC: 35 MG/DL
TSH SERPL-ACNC: 0.84 UIU/ML
VIT B12 SERPL-MCNC: 588 PG/ML

## 2023-08-12 ENCOUNTER — TRANSCRIPTION ENCOUNTER (OUTPATIENT)
Age: 73
End: 2023-08-12

## 2023-08-19 PROBLEM — E78.5 HLD (HYPERLIPIDEMIA): Status: ACTIVE | Noted: 2023-05-08

## 2023-08-19 PROBLEM — R91.1 PULMONARY NODULE: Status: ACTIVE | Noted: 2023-08-19

## 2023-08-19 NOTE — HISTORY OF PRESENT ILLNESS
[FreeTextEntry1] : ESTABLISH CARE [de-identified] : MR. CERVANTES IS A PLEASANT 74 YO PRESENTING TO [facility].  HE HAS A HISTORY OF HYPERTENSION AND HYPERLIPIDEMIA OR AMLODIPINE AND ATORVASTATIN.  NOTES THAT HE UNDERWENT RADIATION WHEN HE WAS [age] YEARS OLD FOR EAR INFECTIONS AND OF TONSILS.  HAD 1/2 OF HIS THYROID REMOVED.  ADVISED BENIGN.  ON SYNTHROID.  THE BEGINNING OF JUNE HE SAW ENT FOR MULTISENSORY DISEQUILIBRIUM AND SENT FOR PHYSICAL THERAPY.  WAS REFERRED TO NEUROLOGY.  NOTES THAT SIX YEARS AGO HE FELL AND HIT HIS HEAD.  3 WEEKS LATER WAS UNABLE TO WALK.  DID INTENSE PHYSICAL THERAPY FOR MONTHS.  GOT WELL.  WAS DOING WELL BUT 18 MONTHS AGO NOTED A DECLINE.  HIS ONLY ISUE HAS BEEN IMBALANCE. LEGS DO NOT GET TIRED.  SAW NEUROLOGY AT [place] AND FOLLOWED UP AGAIN TWO WEEKS AGO.  HAD CORTISONE INJECTION IN HIS BACK 6 MONTHS AGO WITH PAIN MANAGEMENT AND BACK HAS BEEN FINE.  IS TO HAVE REPEAT MRI OF BACK THROUGH NEUROLOGIST.  HAD EMG TESTING PERFORMED.  PRIOR HOLTER DONE WITH CARDIOLOGY.  TAKES DIAZEPAM JUST FOR MRI IMAGING. NEUROLOGY DR. JAGDISH BRINK [phone] CARDIOLOGY: [facility] PAIN MANAGEMENT

## 2023-08-19 NOTE — PHYSICAL EXAM
[No Acute Distress] : no acute distress [Well Nourished] : well nourished [Well-Appearing] : well-appearing [Normal Sclera/Conjunctiva] : normal sclera/conjunctiva [PERRL] : pupils equal round and reactive to light [No Respiratory Distress] : no respiratory distress  [No Accessory Muscle Use] : no accessory muscle use [Clear to Auscultation] : lungs were clear to auscultation bilaterally [Normal Rate] : normal rate  [Regular Rhythm] : with a regular rhythm [Normal S1, S2] : normal S1 and S2 [No Joint Swelling] : no joint swelling [Grossly Normal Strength/Tone] : grossly normal strength/tone [Coordination Grossly Intact] : coordination grossly intact [No Focal Deficits] : no focal deficits [Deep Tendon Reflexes (DTR)] : deep tendon reflexes were 2+ and symmetric [Speech Grossly Normal] : speech grossly normal [Normal Mood] : the mood was normal

## 2023-08-19 NOTE — PLAN
[FreeTextEntry1] : CHART NOTES REVIEWED AS WELL AS RECORDS BROUGHT IN BY MR. CERVANTES TO COMPLETE TESTING WITH NEUROLOGY, CONSIDER VASCULAR EVALUATION WILL MONITOR LAB WORK INCLUDING LIPIDS TO CALL WITH DOSING OF MEDICATIONS HE IS CURRENTLY TAKING DISCUSSED PRIOR PULMONARY NODULE SEEN ON IMAGING - IS UNSURE IF HE WANTS TO GO FOR ANY FOLLOW-UP IMAGING TO CONTACT PRIOR PROVIDER TO DETERMINE WHEN [facility]NEUM[facility]X GIVEN FOLLOW-UP FOR CPE CALL WITH ANY QUESTIONS, CONCERNS OR CHANGES

## 2023-08-31 ENCOUNTER — NON-APPOINTMENT (OUTPATIENT)
Age: 73
End: 2023-08-31

## 2023-09-05 ENCOUNTER — APPOINTMENT (OUTPATIENT)
Dept: OTOLARYNGOLOGY | Facility: CLINIC | Age: 73
End: 2023-09-05

## 2023-09-13 ENCOUNTER — APPOINTMENT (OUTPATIENT)
Dept: OTOLARYNGOLOGY | Facility: CLINIC | Age: 73
End: 2023-09-13
Payer: MEDICARE

## 2023-09-13 PROCEDURE — 99442: CPT | Mod: 95

## 2023-11-20 ENCOUNTER — APPOINTMENT (OUTPATIENT)
Dept: INTERNAL MEDICINE | Facility: CLINIC | Age: 73
End: 2023-11-20
Payer: MEDICARE

## 2023-11-20 VITALS
DIASTOLIC BLOOD PRESSURE: 93 MMHG | HEIGHT: 71 IN | BODY MASS INDEX: 33.32 KG/M2 | HEART RATE: 82 BPM | SYSTOLIC BLOOD PRESSURE: 169 MMHG | WEIGHT: 238 LBS

## 2023-11-20 DIAGNOSIS — M54.50 LOW BACK PAIN, UNSPECIFIED: ICD-10-CM

## 2023-11-20 DIAGNOSIS — R26.89 OTHER ABNORMALITIES OF GAIT AND MOBILITY: ICD-10-CM

## 2023-11-20 DIAGNOSIS — I10 ESSENTIAL (PRIMARY) HYPERTENSION: ICD-10-CM

## 2023-11-20 DIAGNOSIS — G89.29 LOW BACK PAIN, UNSPECIFIED: ICD-10-CM

## 2023-11-20 DIAGNOSIS — R42 DIZZINESS AND GIDDINESS: ICD-10-CM

## 2023-11-20 PROCEDURE — 99215 OFFICE O/P EST HI 40 MIN: CPT

## 2023-12-15 ENCOUNTER — APPOINTMENT (OUTPATIENT)
Dept: ORTHOPEDIC SURGERY | Facility: CLINIC | Age: 73
End: 2023-12-15
Payer: MEDICARE

## 2023-12-15 PROCEDURE — 99203 OFFICE O/P NEW LOW 30 MIN: CPT

## 2023-12-15 PROCEDURE — 73080 X-RAY EXAM OF ELBOW: CPT | Mod: LT

## 2023-12-15 NOTE — HISTORY OF PRESENT ILLNESS
[de-identified] : 12/15/2023 : FATOUMATA CERVANTES  is a 73 year  old male who presents to the office for evaluation of his left elbow.  He states that for about 6 months now without any injury he has had pain over the left elbow.  He uses a cane to walk and uses the left arm to use the cane because of balance issues.  He has been going to physical therapy but states that he had pain over the medial aspect of the elbow that is worse certain activities and motions and alleviated with rest.  He states he tolerates it and uses ice packs and heat baths which help but he still has pain.  He is here for specialist opinion due to his continued pain.  He denies any numbness or tingling distally.  He has no other complaints today.

## 2023-12-15 NOTE — DISCUSSION/SUMMARY
[de-identified] : Assessment: Patient is 73-year-old male with left golfers elbow  Plan: I had a long discussion with the patient today regarding the nature of their diagnosis and treatment plan. We discussed the risks and benefits of no treatment as well as nonoperative and operative treatments.  I reviewed the x-ray today that showed no acute bony pathology with mild degenerative changes.  On examination he has tenderness over the medial epicondyle with good range of motion and strength.  This time I am recommending conservative treatment clued ice, heat, rest, Mobic 15 mg daily as needed for pain and inflammation and counterforce bracing and stretching and therapy.  He was given prescriptions today and GI precautions were discussed.  He was shown the brace in the office today.  He will avoid exacerbating activities and will follow-up in 6 to 8 weeks as needed for repeat evaluation.  We may consider MRI versus injection if symptoms were to persist despite conservative treatment.  Patient seen and examined with Dr. Melo today.   The patient verbalizes their understanding and agrees with the plan.  All questions were answered to their satisfaction.

## 2023-12-15 NOTE — PHYSICAL EXAM
[de-identified] :  general: Awake, alert, no acute distress, Patient was cooperative and appropriate during the examination.  The patient is of normal weight for height and age.  Walks without an antalgic gait.  Full, painless range of motion of the neck and back.  Exam of the bilateral lower extremities is intact and symmetric with regards to dermatologic, vascular, and neurologic exam. Bilateral lower extremity sensation is grossly intact to light touch in the DP/SP/T/S/S nerve distributions. Intact DF/PF/EHL. BIlateral lower extremities warm and well-perfused with brisk capillary refill.   Pulmonary: Regular, nonlabored breathing  Abdomen: Soft, nontender, nondistended.  Lymphatic: No evidence of axillary lymphadenopathy  Left elbow Exam: Physical exam of the elbow demonstrates normal skin without signs of skin changes or abnormalities. No erythema, warmth, or joint effusion appreciated.    Sensation intact light touch C5-T1 Palpable radial pulse Radial/ulnar/median/axillary/musculocutaneous/AIN/PIN nerves grossly intact   Range of motion: Flexion-Extension 0-130. Pronation-Supination 85-85   Palpation: Exquisitely tender to palpation over the medial epicondyle and common flexor origin Not tender palpation over the lateral epicondyle Not tender to palpation over the radial head Not tender to palpation over the olecranon Not tender to palpation over the distal biceps insertion Not tender to palpation over the distal triceps insertion Not tender to palpation over the cubital tunnel   Strength testing: Elbow Flexion 5/5 Elbow Extension 5/5 Pronation 5/5 Supination 5/5   Special Tests: No varus/valgus laxity at 0 and 30 degrees of elbow flexion Moderate pain with resisted wrist/finger flexion and forearm supination No pain with resisted wrist/finger extension and forearm pronation Negative hook test Negative Tinel's over the carpal and cubital tunnels [de-identified] : X-ray 3 views of the left elbow taken in the office today shows no acute fracture dislocation with mild degenerative changes of the elbow noted.

## 2024-01-01 NOTE — ED ADULT NURSE NOTE - NS ED NURSE REPORT GIVEN TO FT
Triggering events leading to humiliation, shame, and/or despair (e.g., Loss of relationship, financial or health status) (real or anticipated)/Legal problems/Substance intoxication or withdrawal Aron RN

## 2024-01-03 NOTE — PRE-ANESTHESIA EVALUATION ADULT - NSPREOPDXFT_GEN_ALL_CORE
CHIEF COMPLAINT  Chief Complaint   Patient presents with    Otalgia    Temporomandibular Joint Pain       HISTORY OF PRESENT ILLNESS    Yamil Lindquist is a 40 y.o. male here for recheck and follow up of otalgia, TMJ.  Patient stated that \"everything is doing okay, every thing is cleared up.\"        REVIEW OF SYSTEMS  Constitutional:  Denied fever and chills.  ENT/sinus:  Denied otalgia, otorrhea, nasal pain, rhinorrhea, sore throat, and sinus/facial pain.        EXAMINATION    WDWN, NAD  Face:  TMJs nontender.  Voice:  Normal.  Ears:  TMs, EACs, mastoids and pinnae were normal.    Nose:  Normal with no lesions.  Sinuses:  NT x 4   Throat,  OC/OP:  Normal with no lesions.  Neck:  NT, No masses.  Trachea midline.  Nodes:  No lymphadenopathy.   Thyroid:  Normal with no goiter, nodules or tenderness.       I have performed a head and neck physical exam personally.      IMPRESSION / DIAGNOSES / ORDERS:   Yamil was seen today for otalgia and temporomandibular joint pain.    Diagnoses and all orders for this visit:    Otalgia of both ears    Temporomandibular joint syndrome        RECOMMENDATIONS / PLAN:   Ibuprofen as needed for TMJ arthralgia or otalgia.  Continue OTC allergy medications prn.    See ophthalmologist for evaluation of eye problem.       Holger Sheffield MD    [facility]  Department of Otolaryngology/Head and Neck Surgery  [facility]  [address], [address]  [place] [address]  [phone]    
;left cataract

## 2024-01-19 ENCOUNTER — APPOINTMENT (OUTPATIENT)
Dept: INTERNAL MEDICINE | Facility: CLINIC | Age: 74
End: 2024-01-19
Payer: MEDICARE

## 2024-01-19 DIAGNOSIS — Z23 ENCOUNTER FOR IMMUNIZATION: ICD-10-CM

## 2024-01-19 PROCEDURE — 90715 TDAP VACCINE 7 YRS/> IM: CPT | Mod: GY

## 2024-01-19 PROCEDURE — 90471 IMMUNIZATION ADMIN: CPT

## 2024-01-24 ENCOUNTER — APPOINTMENT (OUTPATIENT)
Dept: ORTHOPEDIC SURGERY | Facility: CLINIC | Age: 74
End: 2024-01-24
Payer: MEDICARE

## 2024-01-24 DIAGNOSIS — M25.522 PAIN IN LEFT ELBOW: ICD-10-CM

## 2024-01-24 PROCEDURE — 20605 DRAIN/INJ JOINT/BURSA W/O US: CPT | Mod: LT

## 2024-01-24 PROCEDURE — 99213 OFFICE O/P EST LOW 20 MIN: CPT | Mod: 25

## 2024-01-24 NOTE — PHYSICAL EXAM
[de-identified] :  general: Awake, alert, no acute distress, Patient was cooperative and appropriate during the examination.  The patient is of normal weight for height and age.  Walks without an antalgic gait.  Full, painless range of motion of the neck and back.  Exam of the bilateral lower extremities is intact and symmetric with regards to dermatologic, vascular, and neurologic exam. Bilateral lower extremity sensation is grossly intact to light touch in the DP/SP/T/S/S nerve distributions. Intact DF/PF/EHL. BIlateral lower extremities warm and well-perfused with brisk capillary refill.   Pulmonary: Regular, nonlabored breathing  Abdomen: Soft, nontender, nondistended.  Lymphatic: No evidence of axillary lymphadenopathy  Left elbow Exam: Physical exam of the elbow demonstrates normal skin without signs of skin changes or abnormalities. No erythema, warmth, or joint effusion appreciated.    Sensation intact light touch C5-T1 Palpable radial pulse Radial/ulnar/median/axillary/musculocutaneous/AIN/PIN nerves grossly intact   Range of motion: Flexion-Extension 0-130. Pronation-Supination 85-85   Palpation: Exquisitely tender to palpation over the medial epicondyle and common flexor origin Mildly tender palpation over the lateral epicondyle Not tender to palpation over the radial head Not tender to palpation over the olecranon Not tender to palpation over the distal biceps insertion Not tender to palpation over the distal triceps insertion Not tender to palpation over the cubital tunnel   Strength testing: Elbow Flexion 5/5 Elbow Extension 5/5 Pronation 5/5 Supination 5/5   Special Tests: No varus/valgus laxity at 0 and 30 degrees of elbow flexion Moderate pain with resisted wrist/finger flexion and forearm supination No pain with resisted wrist/finger extension and forearm pronation Negative hook test Negative Tinel's over the carpal and cubital tunnels [de-identified] : X-ray 3 views of the left elbow taken in the office today shows no acute fracture dislocation with mild degenerative changes of the elbow noted.

## 2024-01-24 NOTE — HISTORY OF PRESENT ILLNESS
[de-identified] : 01/24/2024 : FATOUMATA CERVANTES  is a 73 year  old male who presents to the office for follow-up evaluation of the left elbow.  He states since last office visit he has been doing therapy and medicine and bracing and the symptoms have gotten a little better but not significantly.  He states her therapist says that he should get an injection.  He is here to consider an injection.  Denies numbness or tingling distally.  He has no other complaints today.  12/15/2023 : FATOUMATA CERVANTES  is a 73 year  old male who presents to the office for evaluation of his left elbow.  He states that for about 6 months now without any injury he has had pain over the left elbow.  He uses a cane to walk and uses the left arm to use the cane because of balance issues.  He has been going to physical therapy but states that he had pain over the medial aspect of the elbow that is worse certain activities and motions and alleviated with rest.  He states he tolerates it and uses ice packs and heat baths which help but he still has pain.  He is here for specialist opinion due to his continued pain.  He denies any numbness or tingling distally.  He has no other complaints today.

## 2024-01-24 NOTE — PROCEDURE
[de-identified] : I injected the patient's left elbow medial epicondyle today with cortisone.   I discussed at length with the patient the planned steroid and lidocaine injection. The risks, benefits, convalescence and alternatives were reviewed. The possible side effects discussed included but were not limited to: pain, swelling, heat, bleeding, and redness. Symptoms are generally mild but if they are extensive then contact the office. Giving pain relievers by mouth such as NSAIDs or Tylenol can generally treat the reactions to steroid and lidocaine. Rare cases of infection have been noted. Rash, hives and itching may occur post injection. If you have muscle pain or cramps, flushing and or swelling of the face, rapid heart beat, nausea, dizziness, fever, chills, headache, difficulty breathing, swelling in the arms or legs, or have a prickly feeling of your skin, contact a health care provider immediately. Following this discussion, the shoulder was prepped with Chlorhexidine and Alcohol and under sterile conditions the 40 mg Depo-Medrol and 1 cc Lidocaine injection was performed with a 22 gauge needle through a lateral injection site. The needle was introduced down to the medial epicondyle, slightly withdrawn, and the medication was injected. Upon withdrawal of the needle the site was cleaned with alcohol and a band aid was applied. The patient tolerated the injection well and there were no adverse effects. Post injection instructions included no strenuous activity for 24 hours, cryotherapy and if there are any adverse effects to contact the office.

## 2024-01-24 NOTE — DISCUSSION/SUMMARY
[de-identified] : Assessment: Patient is 73-year-old male with left golfers elbow  Plan: I had a long discussion with the patient today regarding the nature of their diagnosis and treatment plan. We discussed the risks and benefits of no treatment as well as nonoperative and operative treatments.  I reviewed the x-ray today that showed no acute bony pathology with mild degenerative changes.  On examination he has tenderness over the medial epicondyle with good range of motion and strength.  This time I am recommending continued conservative treatment including ice, heat, rest, Mobic 15 mg daily as needed for pain and inflammation and counterforce bracing and stretching and therapy.  He was given prescriptions today and GI precautions were discussed.  He will continue to use the brace.  He will avoid exacerbating activities and I am recommending elbow medial epicondyle injection be performed in the office today.  He tolerated procedure well with no adverse effects.  He will follow-up in 6 to 8 weeks for repeat evaluation to reassess and if symptoms were to persist despite conservative treatment we may recommend MRI.  Patient discussed and reviewed with Dr. Melo today.   The patient verbalizes their understanding and agrees with the plan.  All questions were answered to their satisfaction.

## 2024-02-05 RX ORDER — DICLOFENAC SODIUM 1% 10 MG/G
1 GEL TOPICAL DAILY
Qty: 2 | Refills: 0 | Status: ACTIVE | COMMUNITY
Start: 2024-02-05 | End: 1900-01-01

## 2024-02-26 ENCOUNTER — NON-APPOINTMENT (OUTPATIENT)
Age: 74
End: 2024-02-26

## 2024-04-25 DIAGNOSIS — E03.9 HYPOTHYROIDISM, UNSPECIFIED: ICD-10-CM

## 2024-05-28 ENCOUNTER — NON-APPOINTMENT (OUTPATIENT)
Age: 74
End: 2024-05-28

## 2024-06-19 NOTE — H&P ADULT - NSHPSOCIALHISTORY_GEN_ALL_CORE
Patient had dialysis catheter placed today, patient tolerated it well. Patient has had minor hallucinations throughout the day thinking he is seeing his dog in the room.   Patient getting dialysis late tonight   2 drinks daily   denies substance abuse   denies smoking

## 2024-08-06 NOTE — ED PROVIDER NOTE - CPE EDP EYES NORM
Advair Diskus 500 mcg-50 mcg inhalation powder: 1 inhaled 2 times a day  Albuterol (Eqv-ProAir HFA) 90 mcg/inh inhalation aerosol: 2 puff(s) inhaled as needed for  shortness of breath and/or wheezing  Ambien CR 12.5 mg oral tablet, extended release: 1 tab(s) orally prn  bethanechol 10 mg oral tablet: 1 tab(s) orally 3 times a day (before meals)  Eliquis 2.5 mg oral tablet: 1 tab(s) orally 2 times a day  metoprolol succinate 50 mg oral tablet, extended release: 1 tab(s) orally once a day  Motegrity 2 mg oral tablet: 1 tab(s) orally once a day  Nasacort 55 mcg/inh nasal aerosol: nasal once a day  omeprazole 40 mg oral delayed release capsule: 1 cap(s) orally once a day  Spiriva HandiHaler 18 mcg inhalation capsule: 1 cap(s) inhaled once a day  Trintellix 10 mg oral tablet: 1 tab(s) orally once a day  Xanax 0.25 mg oral tablet: 1 tab(s) orally as needed for  anxiety  Zofran 4 mg oral tablet: 1 tab(s) orally as needed for  nausea   - - - acetaminophen 325 mg oral tablet: 2 tab(s) orally every 6 hours As needed Mild Pain (1 - 3)  Advair Diskus 500 mcg-50 mcg inhalation powder: 1 inhaled 2 times a day  Albuterol (Eqv-ProAir HFA) 90 mcg/inh inhalation aerosol: 2 puff(s) inhaled as needed for  shortness of breath and/or wheezing  Ambien CR 12.5 mg oral tablet, extended release: 1 tab(s) orally prn  bethanechol 10 mg oral tablet: 1 tab(s) orally 3 times a day (before meals)  Eliquis 2.5 mg oral tablet: 1 tab(s) orally 2 times a day  metoprolol succinate 50 mg oral tablet, extended release: 1 tab(s) orally once a day  Motegrity 2 mg oral tablet: 1 tab(s) orally once a day  Nasacort 55 mcg/inh nasal aerosol: nasal once a day  omeprazole 40 mg oral delayed release capsule: 1 cap(s) orally once a day  Spiriva HandiHaler 18 mcg inhalation capsule: 1 cap(s) inhaled once a day  Trintellix 10 mg oral tablet: 1 tab(s) orally once a day  Xanax 0.25 mg oral tablet: 1 tab(s) orally as needed for  anxiety  Zofran 4 mg oral tablet: 1 tab(s) orally as needed for  nausea   acetaminophen 325 mg oral tablet: 2 tab(s) orally every 6 hours As needed Mild Pain (1 - 3)  Advair Diskus 500 mcg-50 mcg inhalation powder: 1 inhaled 2 times a day  Albuterol (Eqv-ProAir HFA) 90 mcg/inh inhalation aerosol: 2 puff(s) inhaled as needed for  shortness of breath and/or wheezing  Ambien CR 12.5 mg oral tablet, extended release: 1 tab(s) orally prn  bethanechol 10 mg oral tablet: 1 tab(s) orally 3 times a day (before meals)  Eliquis 2.5 mg oral tablet: 1 tab(s) orally 2 times a day  ketorolac 10 mg oral tablet: 1 tab(s) orally 3 times a day as needed for  mild pain  metoprolol succinate 50 mg oral tablet, extended release: 1 tab(s) orally once a day  Motegrity 2 mg oral tablet: 1 tab(s) orally once a day  Nasacort 55 mcg/inh nasal aerosol: nasal once a day  omeprazole 40 mg oral delayed release capsule: 1 cap(s) orally once a day  senna leaf extract oral tablet: 2 tab(s) orally once a day (at bedtime)  Spiriva HandiHaler 18 mcg inhalation capsule: 1 cap(s) inhaled once a day  Trintellix 10 mg oral tablet: 1 tab(s) orally once a day  Xanax 0.25 mg oral tablet: 1 tab(s) orally as needed for  anxiety  Zofran 4 mg oral tablet: 1 tab(s) orally as needed for  nausea

## 2024-09-16 ENCOUNTER — NON-APPOINTMENT (OUTPATIENT)
Age: 74
End: 2024-09-16

## 2024-12-04 ENCOUNTER — NON-APPOINTMENT (OUTPATIENT)
Age: 74
End: 2024-12-04

## 2024-12-25 PROBLEM — F10.90 ALCOHOL USE: Status: ACTIVE | Noted: 2023-06-01

## 2025-01-23 NOTE — PHYSICAL THERAPY INITIAL EVALUATION ADULT - ASR WT BEARING STATUS EVAL
CHIEF COMPLAINT  Follow-up for back pain      Subjective   Avni Olivo III is a 66 y.o. male  who presents to the office for follow-up of procedure.  He completed a Bilateral L3-5 Lumbar Medial Branch RADIOFREQUENCY   on  10/15/2024 performed by Dr. Monte for management of back pain. Patient reports 70% relief from the procedure for 2.5 months and 60% ongoing pain relief.  The patient is pleased to report that even with increased activity which included a recent cruise vacation as well as having to shovel snow that he has not had significant worsening of pain.  This patient obtains at least 50-60% pain relief x 6 months following his radiofrequency ablation procedures.    Medication regimen consists of very sparing use of tramadol 50 mg stating that he is only used 2 tablets since his last office visit.    Pain today 3/10 VAS in severity.    Back Pain  This is a chronic problem. The current episode started more than 1 year ago. The problem occurs constantly. The problem has been gradually improving since onset. The pain is present in the lumbar spine. The quality of the pain is described as aching (throbbing). The pain does not radiate. The pain is at a severity of 3/10. The pain is moderate. The pain is Worse during the day. The symptoms are aggravated by position, twisting and bending. Stiffness is present In the morning. Pertinent negatives include no abdominal pain, chest pain, dysuria, fever, headaches, numbness or weakness.        PEG Assessment   What number best describes your pain on average in the past week?3  What number best describes how, during the past week, pain has interfered with your enjoyment of life?3  What number best describes how, during the past week, pain has interfered with your general activity?  3    Review of Pertinent Medical Data ---  MRI OF THE LUMBAR SPINE WITH AND WITHOUT CONTRAST 08/26/2022     CLINICAL HISTORY: Low back pain, bilateral leg weakness, status post  lumbar spine  decompression on 10/29/2020.     TECHNIQUE: Sagittal T1, proton density fat-suppressed T2 and  postcontrast sagittal T1-weighted images were obtained of the lumbar  spine in addition thin cut axial pre and postcontrast T1-weighted images  were obtained angled through the interspaces from L3 to S1.     This is correlated to 4 prior MRIs of the lumbar spine dating all the  way back to 07/24/2014 and 09/04/2015 and preoperative MRI lumbar spine  08/31/2020 and postoperative MRI of the lumbar spine 07/29/2021. This  also correlated to intraoperative C-arm views of lumbar spine  10/29/2021, lumbar spine plain film series with flexion-extension views  today 08/26/2022.     FINDINGS: There is transitional anatomy at the lumbosacral junction for  the purposes of this report is a transitional vertebra is labeled as a  partially lumbarized S1 vertebrae with the last-inferior most partially  hydrated hypoplastic disc space labeled as the S1-S2 disc space. Above  this are 5 nonrib-bearing vertebra labeled as L1 through L5 and this is  the same numbering system used on prior MRIs of the lumbar spine.     The distal thoracic cord and the conus is normal in signal intensity  conus terminates at the mid body of L2 lumbar level which is normal.     At T11-12 and T12-L1 the disc space and facets are normal with no canal  or foraminal narrowing.     At L1-2 there is a right paracentral posterior lateral tiny disc  protrusion only minimally indents the right ventral aspect thecal sac is  essentially no canal or foraminal narrowing.     At L2-3, the disc space and facets are normal with no canal or foraminal  narrowing.     At L3-4 there is mild-to-moderate bilateral facet overgrowth some fluid  in the facet joints. There is a posterior central to left paracentral  disc herniation with extruded disc material extending along the  posterior central to left paracentral inferior endplate of L4. There is  severe narrowing of the thecal sac  at L3-4 with diminished spinal fluid  bathing the cauda equina at L3-4 lumbar level. There is only minimal  foraminal narrowing.     At L4-5 the patient has had prior lumbar spine surgery on 10/29/2020  during which there is performed to bilateral laminectomies at L4, medial  facetectomies at L4-5. There is moderate bilateral facet overgrowth at  L4-5, minimal posterior disc bulge. Due to posterior decompression there  is no canal narrowing at L4-5. There is mild left but no right foraminal  narrowing at L4-5.     At L5-S1 the patient had prior lumbar spine surgery with bilateral  laminectomies at L5. There is severe bilateral facet overgrowth at L5-S1  with 3 mm anterolisthesis of L5 on S1. There is no canal narrowing into  the medial facet spurs perhaps slightly narrow the lateral recesses but  there is some loss of vertical height of the foramen and bulging disc  material into the inferior foramen spurring synovial thickening off the  facets extending the posterior aspect of the foramen and there is  mild-to-moderate bilateral foraminal narrowing.     IMPRESSION:  1. This patient has transitional anatomy at the lumbosacral junction for  the purposes of this report the transitional vertebra is labeled as a  partially lumbarized S1 vertebra with the last-inferior most partially  hydrated hypoplastic disc space labeled as the S1-S2 disc space and  above the partially lumbarized S1 vertebra are 5 nonrib-bearing vertebra  labeled as L1-L5 and lowest-most rib-bearing vertebra is labeled as T12.  This same numbering system is used on prior lumbar spine MRIs.  2. Patient has had previous lumbar spine surgery with bilateral  laminectomies at L4 and L5 and S1, bilateral medial facetectomies at  L4-5 and L5-S1. There is no residual canal narrowing at L4-5 or L5-S1  there is mild left but no right foraminal narrowing at L4-5. There is  mild-to-moderate bilateral foraminal narrowing at L5-S1.  3. At L3-4 there is  "mild-to-moderate bilateral facet overgrowth and  there is posterior central left paracentral disc herniation with some  extruded disc material extending along the midline left paracentral  inferior body and endplate of L3. There is severe narrowing of the  thecal sac at the L3-4 level with diminished spinal fluid bathing the  cauda equina at the L3-4 level. There is only mild bilateral foraminal  narrowing at L3-4.  4. L1-2 there is a tiny right paracentral posterior lateral disc  protrusion only minimally indents the ventral aspect of the thecal sac  and there is no canal or foraminal narrowing. The remainder of the  lumbar spine MRI is unremarkable.     This report was finalized on 8/29/2022 2:10 PM by Dr. Vargas Adorno M.D.    The following portions of the patient's history were reviewed and updated as appropriate: allergies, current medications, past family history, past medical history, past social history, past surgical history, and problem list.    Review of Systems   Constitutional:  Negative for fever.   Cardiovascular:  Negative for chest pain.   Gastrointestinal:  Negative for abdominal pain, constipation and diarrhea.   Genitourinary:  Negative for difficulty urinating and dysuria.   Musculoskeletal:  Positive for back pain.   Neurological:  Negative for weakness, numbness and headaches.   Psychiatric/Behavioral:  Negative for sleep disturbance and suicidal ideas. The patient is not nervous/anxious.      I have reviewed and confirmed the accuracy of the ROS as documented by the MA/LPN/DARWIN Riojas   Vitals:    01/23/25 1038   BP: 145/78   Pulse: 58   Resp: 18   Temp: 98.9 °F (37.2 °C)   SpO2: 96%   Weight: 94 kg (207 lb 3.2 oz)   Height: 180.3 cm (70.98\")   PainSc:   3   PainLoc: Back         Objective   Physical Exam  Vitals reviewed.   Constitutional:       Appearance: Normal appearance. He is normal weight.   HENT:      Head: Normocephalic.   Pulmonary:      Effort: Pulmonary effort is normal. "   Musculoskeletal:      Lumbar back: Tenderness (moderate tenderness to palpation over the bilateral lumbar facet joint spaces) present. Decreased range of motion.        Back:    Skin:     General: Skin is warm and dry.   Neurological:      General: No focal deficit present.      Mental Status: He is alert and oriented to person, place, and time.      Cranial Nerves: Cranial nerves 2-12 are intact.      Sensory: Sensation is intact.      Motor: Motor function is intact.      Gait: Gait is intact.   Psychiatric:         Mood and Affect: Mood normal.         Behavior: Behavior normal.         Thought Content: Thought content normal.         Judgment: Judgment normal.       PHQ-2 Depression Screening  Little interest or pleasure in doing things? Not at all   Feeling down, depressed, or hopeless? Not at all   PHQ-2 Total Score 0     Tobacco Use: Medium Risk (1/23/2025)    Patient History     Smoking Tobacco Use: Never     Smokeless Tobacco Use: Former     Passive Exposure: Not on file           Assessment & Plan   Diagnoses and all orders for this visit:    1. Lumbosacral spondylosis without myelopathy (Primary)    2. Spinal stenosis of lumbar region with neurogenic claudication        Avni Olivo III reports a pain score of 3.  Given his pain assessment as noted, treatment options were discussed and the following options were decided upon as a follow-up plan to address the patient's pain: continuation of current treatment plan for pain and use of non-medical modalities (ice, heat, stretching and/or behavior modifications).      --- Follow-up in 2 months for further evaluation and treatment recommendations.  --- Continue with sparing use of tramadol as needed.  He does not require refill on today.  --- Patient may likely require therapeutic lumbar radiofrequency ablation however we will reassess at his next office visit.         JANET SALAS  As part of the patient's treatment plan, I am prescribing controlled  substances. The patient has been made aware of appropriate use of such medications, including potential risk of somnolence, limited ability to drive and/or work safely, and the potential for dependence or overdose. It has also been made clear that these medications are for use by this patient only, without concomitant use of alcohol or other substances unless prescribed.     Patient has completed prescribing agreement detailing terms of continued prescribing of controlled substances, including monitoring JANET reports, urine drug screening, and pill counts if necessary. The patient is aware that inappropriate use will results in cessation of prescribing such medications.    As the clinician, I personally reviewed the JANET from 1/23/2025 while the patient was in the office today.    History and physical exam exhibit continued safe and appropriate use of controlled substances.       Dictated utilizing Dragon dictation.     Right UE

## 2025-02-10 ENCOUNTER — APPOINTMENT (OUTPATIENT)
Dept: INTERNAL MEDICINE | Facility: CLINIC | Age: 75
End: 2025-02-10
Payer: MEDICARE

## 2025-02-10 VITALS
SYSTOLIC BLOOD PRESSURE: 148 MMHG | RESPIRATION RATE: 16 BRPM | BODY MASS INDEX: 30.8 KG/M2 | HEIGHT: 71 IN | DIASTOLIC BLOOD PRESSURE: 75 MMHG | HEART RATE: 90 BPM | WEIGHT: 220 LBS

## 2025-02-10 DIAGNOSIS — R26.89 OTHER ABNORMALITIES OF GAIT AND MOBILITY: ICD-10-CM

## 2025-02-10 DIAGNOSIS — G89.29 LOW BACK PAIN, UNSPECIFIED: ICD-10-CM

## 2025-02-10 DIAGNOSIS — R26.9 UNSPECIFIED ABNORMALITIES OF GAIT AND MOBILITY: ICD-10-CM

## 2025-02-10 DIAGNOSIS — I10 ESSENTIAL (PRIMARY) HYPERTENSION: ICD-10-CM

## 2025-02-10 DIAGNOSIS — M54.50 LOW BACK PAIN, UNSPECIFIED: ICD-10-CM

## 2025-02-10 PROCEDURE — 99205 OFFICE O/P NEW HI 60 MIN: CPT

## 2025-02-10 PROCEDURE — 99215 OFFICE O/P EST HI 40 MIN: CPT

## 2025-02-11 PROBLEM — R26.9 ABNORMAL GAIT: Status: ACTIVE | Noted: 2025-02-11

## 2025-02-25 ENCOUNTER — EMERGENCY (EMERGENCY)
Facility: HOSPITAL | Age: 75
LOS: 1 days | Discharge: HOME | End: 2025-02-25
Attending: STUDENT IN AN ORGANIZED HEALTH CARE EDUCATION/TRAINING PROGRAM
Payer: MEDICARE

## 2025-02-25 VITALS
WEIGHT: 222.01 LBS | DIASTOLIC BLOOD PRESSURE: 81 MMHG | TEMPERATURE: 98 F | HEART RATE: 86 BPM | OXYGEN SATURATION: 96 % | RESPIRATION RATE: 16 BRPM | SYSTOLIC BLOOD PRESSURE: 151 MMHG

## 2025-02-25 DIAGNOSIS — Z98.49 CATARACT EXTRACTION STATUS, UNSPECIFIED EYE: Chronic | ICD-10-CM

## 2025-02-25 DIAGNOSIS — Z98.890 OTHER SPECIFIED POSTPROCEDURAL STATES: Chronic | ICD-10-CM

## 2025-02-25 PROCEDURE — 12002 RPR S/N/AX/GEN/TRNK2.6-7.5CM: CPT

## 2025-02-25 PROCEDURE — 99285 EMERGENCY DEPT VISIT HI MDM: CPT | Mod: 25

## 2025-02-25 RX ORDER — ACETAMINOPHEN 500 MG/5ML
975 LIQUID (ML) ORAL ONCE
Refills: 0 | Status: DISCONTINUED | OUTPATIENT
Start: 2025-02-25 | End: 2025-03-05

## 2025-02-25 NOTE — ED ADULT TRIAGE NOTE - CHIEF COMPLAINT QUOTE
patient status post fall off a chair onto his side, striking his head on a fireplace (wooden), patient has approx 5 in laceration to right side of head, bleeding controlled, patient denied loss of consciousness, denied AC, has hx of SAH from a fall in the past, brought to critical [facility] for evaluation.  Patient reports dizziness, admits to ETOH earlier, Alert and oriented to person, place, and time,

## 2025-02-25 NOTE — ED ADULT NURSE NOTE - NSFALLHARMRISKINTERV_ED_ALL_ED

## 2025-02-25 NOTE — ED ADULT NURSE NOTE - CAS ELECT INFOMATION PROVIDED
Patient verbalizes understanding of and agrees with discharge instructions.  wheeled off unit in wheelchair without incident./DC instructions

## 2025-02-25 NOTE — ED ADULT NURSE NOTE - OBJECTIVE STATEMENT
Patient presents post fall from chair with head strike.  Pt A&Ox4, states he leaned backwards in chair and fell over.  Laceration to right posterior head present with bleeding controlled, pt denies LOC after fall, endorsing mild dizziness.  No neck pain, no blood thinners, no neuro deficits, perrla.

## 2025-02-25 NOTE — ED ADULT NURSE NOTE - CHIEF COMPLAINT QUOTE
patient status post fall off a chair onto his side, striking his head on a fireplace (wooden), patient has approx 5 in laceration to right side of head, bleeding controlled, patient denied loss of consciousness, denied AC, has hx of SAH from a fall in the past, brought to critical [facility] for evaluation.  Patient reports dizziness, admits to ETOH earlier, Alert and oriented to person, place, and time,

## 2025-02-26 PROCEDURE — 70450 CT HEAD/BRAIN W/O DYE: CPT | Mod: MC

## 2025-02-26 PROCEDURE — 72125 CT NECK SPINE W/O DYE: CPT | Mod: MC

## 2025-02-26 PROCEDURE — 99284 EMERGENCY DEPT VISIT MOD MDM: CPT | Mod: 25

## 2025-02-26 PROCEDURE — 72125 CT NECK SPINE W/O DYE: CPT | Mod: 26

## 2025-02-26 PROCEDURE — 70450 CT HEAD/BRAIN W/O DYE: CPT | Mod: 26

## 2025-02-26 PROCEDURE — 12002 RPR S/N/AX/GEN/TRNK2.6-7.5CM: CPT

## 2025-02-26 NOTE — ED PROVIDER NOTE - GASTROINTESTINAL, MLM
Bronchoscopy Central Line Insertion Bronchoscopy Central Line Insertion Central Line Insertion Abdomen soft, non-tender, no guarding.

## 2025-02-26 NOTE — ED PROVIDER NOTE - NSFOLLOWUPINSTRUCTIONS_ED_ALL_ED_FT
Laceration    Return here or go to the nearest emergency department or urgent care for repeat evaluation / staple removal in 7 days.      A laceration is a cut that goes through all of the layers of the skin and into the tissue that is right under the skin. Some lacerations heal on their own. Others need to be closed with skin adhesive strips, skin glue, stitches (sutures), or staples. Proper laceration care minimizes the risk of infection and helps the laceration to heal better.  If non-absorbable stitches or staples have been placed, they must be taken out within the time frame instructed by your healthcare provider.    SEEK IMMEDIATE MEDICAL CARE IF YOU HAVE ANY OF THE FOLLOWING SYMPTOMS: swelling around the wound, worsening pain, drainage from the wound, red streaking going away from your wound, inability to move finger or toe near the laceration, or discoloration of skin near the laceration.

## 2025-02-26 NOTE — ED PROVIDER NOTE - PROGRESS NOTE DETAILS
VIDHI Parekh: signed out to me pending cth, imaging negative acute, stable for outpt follow up w. return precautions for s/sx of infection, 7 days for staple removal

## 2025-02-26 NOTE — ED PROVIDER NOTE - PATIENT PORTAL LINK FT
You can access the FollowMyHealth Patient Portal offered by [facility] by registering at the following website: [email or web address]. By joining [facility]’s FollowMyHealth portal, you will also be able to view your health information using other applications (apps) compatible with our system.

## 2025-02-26 NOTE — ED PROVIDER NOTE - OBJECTIVE STATEMENT
74-year-old male presents with fall and head strike.  Patient reports that his chair tipped over and he hit his head against the stone fireplace.  He denies loss of consciousness.  He offers no complaints at this time other than scalp laceration.  Denies headache, neck pain, back pain, numbness, weakness

## 2025-02-26 NOTE — ED PROVIDER NOTE - ENMT, MLM
Airway patent, Nasal mucosa clear. Mouth with normal mucosa. Throat has no vesicles, no oropharyngeal exudates and uvula is midline. +7 cm lac to pareital scalp

## 2025-02-28 DIAGNOSIS — S01.01XA LACERATION WITHOUT FOREIGN BODY OF SCALP, INITIAL ENCOUNTER: ICD-10-CM
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
## 2025-02-28 DIAGNOSIS — Z88.8 ALLERGY STATUS TO OTHER DRUGS, MEDICAMENTS AND BIOLOGICAL SUBSTANCES: ICD-10-CM
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
## 2025-03-06 ENCOUNTER — APPOINTMENT (OUTPATIENT)
Dept: INTERNAL MEDICINE | Facility: CLINIC | Age: 75
End: 2025-03-06
Payer: MEDICARE

## 2025-03-06 VITALS
HEART RATE: 76 BPM | WEIGHT: 221 LBS | RESPIRATION RATE: 16 BRPM | BODY MASS INDEX: 30.94 KG/M2 | HEIGHT: 71 IN | SYSTOLIC BLOOD PRESSURE: 152 MMHG | DIASTOLIC BLOOD PRESSURE: 85 MMHG

## 2025-03-06 DIAGNOSIS — I10 ESSENTIAL (PRIMARY) HYPERTENSION: ICD-10-CM

## 2025-03-06 DIAGNOSIS — Z48.02 ENCOUNTER FOR REMOVAL OF SUTURES: ICD-10-CM
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
## 2025-03-06 DIAGNOSIS — S01.01XA LACERATION W/OUT FOREIGN BODY OF SCALP, INITIAL ENCOUNTER: ICD-10-CM

## 2025-03-06 PROCEDURE — 99213 OFFICE O/P EST LOW 20 MIN: CPT
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
## 2025-03-08 PROBLEM — S01.01XA SCALP LACERATION: Status: ACTIVE | Noted: 2025-03-08

## 2025-03-08 PROBLEM — Z48.02 REMOVAL OF STAPLES: Status: ACTIVE | Noted: 2025-03-08

## 2025-04-03 ENCOUNTER — APPOINTMENT (OUTPATIENT)
Dept: INTERNAL MEDICINE | Facility: CLINIC | Age: 75
End: 2025-04-03
Payer: MEDICARE

## 2025-04-03 VITALS
BODY MASS INDEX: 30.8 KG/M2 | DIASTOLIC BLOOD PRESSURE: 72 MMHG | HEIGHT: 71 IN | WEIGHT: 220 LBS | RESPIRATION RATE: 16 BRPM | HEART RATE: 72 BPM | SYSTOLIC BLOOD PRESSURE: 143 MMHG

## 2025-04-03 DIAGNOSIS — E78.5 HYPERLIPIDEMIA, UNSPECIFIED: ICD-10-CM

## 2025-04-03 DIAGNOSIS — E03.9 HYPOTHYROIDISM, UNSPECIFIED: ICD-10-CM

## 2025-04-03 DIAGNOSIS — R26.89 OTHER ABNORMALITIES OF GAIT AND MOBILITY: ICD-10-CM

## 2025-04-03 DIAGNOSIS — I10 ESSENTIAL (PRIMARY) HYPERTENSION: ICD-10-CM

## 2025-04-03 PROCEDURE — 99215 OFFICE O/P EST HI 40 MIN: CPT | Mod: 25

## 2025-04-03 PROCEDURE — 36415 COLL VENOUS BLD VENIPUNCTURE: CPT

## 2025-04-08 LAB
ALBUMIN SERPL ELPH-MCNC: 4.1 G/DL
ALP BLD-CCNC: 85 U/L
ALT SERPL-CCNC: 19 U/L
ANION GAP SERPL CALC-SCNC: 11 MMOL/L
AST SERPL-CCNC: 20 U/L
BASOPHILS # BLD AUTO: 0.03 K/UL
BASOPHILS NFR BLD AUTO: 0.6 %
BILIRUB SERPL-MCNC: 0.4 MG/DL
BUN SERPL-MCNC: 14 MG/DL
CALCIUM SERPL-MCNC: 9.4 MG/DL
CHLORIDE SERPL-SCNC: 107 MMOL/L
CO2 SERPL-SCNC: 24 MMOL/L
CREAT SERPL-MCNC: 0.78 MG/DL
EGFRCR SERPLBLD CKD-EPI 2021: 93 ML/MIN/1.73M2
EOSINOPHIL # BLD AUTO: 0.22 K/UL
EOSINOPHIL NFR BLD AUTO: 4.4 %
ESTIMATED AVERAGE GLUCOSE: 100 MG/DL
GLUCOSE SERPL-MCNC: 97 MG/DL
HBA1C MFR BLD HPLC: 5.1 %
HCT VFR BLD CALC: 43.6 %
HGB BLD-MCNC: 13.7 G/DL
IMM GRANULOCYTES NFR BLD AUTO: 0.2 %
LYMPHOCYTES # BLD AUTO: 1.02 K/UL
LYMPHOCYTES NFR BLD AUTO: 20.5 %
MAN DIFF?: NORMAL
MCHC RBC-ENTMCNC: 29.3 PG
MCHC RBC-ENTMCNC: 31.4 G/DL
MCV RBC AUTO: 93.2 FL
MONOCYTES # BLD AUTO: 0.53 K/UL
MONOCYTES NFR BLD AUTO: 10.6 %
NEUTROPHILS # BLD AUTO: 3.17 K/UL
NEUTROPHILS NFR BLD AUTO: 63.7 %
PLATELET # BLD AUTO: 188 K/UL
POTASSIUM SERPL-SCNC: 4.4 MMOL/L
PROT SERPL-MCNC: 6 G/DL
RBC # BLD: 4.68 M/UL
RBC # FLD: 13.5 %
SODIUM SERPL-SCNC: 142 MMOL/L
T4 SERPL-MCNC: 6.6 UG/DL
TSH SERPL-ACNC: 2.05 UIU/ML
WBC # FLD AUTO: 4.98 K/UL

## 2025-06-12 ENCOUNTER — NON-APPOINTMENT (OUTPATIENT)
Age: 75
End: 2025-06-12

## 2025-07-12 ENCOUNTER — NON-APPOINTMENT (OUTPATIENT)
Age: 75
End: 2025-07-12

## 2025-07-14 ENCOUNTER — APPOINTMENT (OUTPATIENT)
Dept: INTERNAL MEDICINE | Facility: CLINIC | Age: 75
End: 2025-07-14
Payer: MEDICARE

## 2025-07-14 ENCOUNTER — APPOINTMENT (OUTPATIENT)
Dept: ORTHOPEDIC SURGERY | Facility: CLINIC | Age: 75
End: 2025-07-14
Payer: MEDICARE

## 2025-07-14 PROBLEM — M25.511 RIGHT SHOULDER PAIN: Status: ACTIVE | Noted: 2025-07-14

## 2025-07-14 PROCEDURE — 99213 OFFICE O/P EST LOW 20 MIN: CPT | Mod: 2W

## 2025-07-14 PROCEDURE — 73030 X-RAY EXAM OF SHOULDER: CPT | Mod: RT

## 2025-07-14 PROCEDURE — 99214 OFFICE O/P EST MOD 30 MIN: CPT | Mod: 25

## 2025-07-14 PROCEDURE — 20611 DRAIN/INJ JOINT/BURSA W/US: CPT | Mod: RT

## 2025-07-24 ENCOUNTER — APPOINTMENT (OUTPATIENT)
Dept: INTERNAL MEDICINE | Facility: CLINIC | Age: 75
End: 2025-07-24
Payer: MEDICARE

## 2025-07-24 DIAGNOSIS — M54.50 LOW BACK PAIN, UNSPECIFIED: ICD-10-CM

## 2025-07-24 DIAGNOSIS — G89.29 LOW BACK PAIN, UNSPECIFIED: ICD-10-CM

## 2025-07-24 DIAGNOSIS — R42 DIZZINESS AND GIDDINESS: ICD-10-CM

## 2025-07-24 DIAGNOSIS — R26.9 UNSPECIFIED ABNORMALITIES OF GAIT AND MOBILITY: ICD-10-CM

## 2025-07-24 DIAGNOSIS — R26.89 OTHER ABNORMALITIES OF GAIT AND MOBILITY: ICD-10-CM

## 2025-07-24 PROCEDURE — 99213 OFFICE O/P EST LOW 20 MIN: CPT | Mod: 93

## 2025-07-30 ENCOUNTER — APPOINTMENT (OUTPATIENT)
Dept: UROLOGY | Facility: CLINIC | Age: 75
End: 2025-07-30
Payer: MEDICARE

## 2025-07-30 ENCOUNTER — NON-APPOINTMENT (OUTPATIENT)
Age: 75
End: 2025-07-30

## 2025-07-30 VITALS
SYSTOLIC BLOOD PRESSURE: 163 MMHG | HEART RATE: 87 BPM | DIASTOLIC BLOOD PRESSURE: 90 MMHG | BODY MASS INDEX: 30.1 KG/M2 | WEIGHT: 215 LBS | HEIGHT: 71 IN

## 2025-07-30 DIAGNOSIS — N40.1 BENIGN PROSTATIC HYPERPLASIA WITH LOWER URINARY TRACT SYMPMS: ICD-10-CM

## 2025-07-30 LAB
BILIRUB UR QL STRIP: NORMAL
CLARITY UR: CLEAR
COLLECTION METHOD: NORMAL
GLUCOSE UR-MCNC: NORMAL
HCG UR QL: 0.2 EU/DL
HGB UR QL STRIP.AUTO: NORMAL
KETONES UR-MCNC: NORMAL
LEUKOCYTE ESTERASE UR QL STRIP: NORMAL
NITRITE UR QL STRIP: NORMAL
PH UR STRIP: 6
PROT UR STRIP-MCNC: NORMAL
SP GR UR STRIP: 1.01

## 2025-07-30 PROCEDURE — 51798 US URINE CAPACITY MEASURE: CPT

## 2025-07-30 PROCEDURE — 99203 OFFICE O/P NEW LOW 30 MIN: CPT

## 2025-07-30 RX ORDER — ALFUZOSIN HYDROCHLORIDE 10 MG/1
10 TABLET, EXTENDED RELEASE ORAL
Qty: 30 | Refills: 6 | Status: ACTIVE | COMMUNITY
Start: 2025-07-30 | End: 1900-01-01

## 2025-08-08 ENCOUNTER — NON-APPOINTMENT (OUTPATIENT)
Age: 75
End: 2025-08-08

## 2025-08-22 ENCOUNTER — NON-APPOINTMENT (OUTPATIENT)
Age: 75
End: 2025-08-22

## 2025-09-02 ENCOUNTER — NON-APPOINTMENT (OUTPATIENT)
Age: 75
End: 2025-09-02

## 2025-09-03 ENCOUNTER — APPOINTMENT (OUTPATIENT)
Dept: UROLOGY | Facility: CLINIC | Age: 75
End: 2025-09-03

## (undated) DEVICE — SUT NYLON 10-0 12" CU-5

## (undated) DEVICE — PACK CENTURION 2.4MM

## (undated) DEVICE — SOL SYR OPHTHALMIC VISION BLUE TRYPAN BLUE 0.06% 0.5 ML

## (undated) DEVICE — PACK ANTERIOR SEGMENT

## (undated) DEVICE — GLV 6.5 PROTEXIS W HYDROGEL

## (undated) DEVICE — TRANSFORMER INTREPID I/A 0.3MM

## (undated) DEVICE — CANNULA IRR ANT CHAMBER 30G

## (undated) DEVICE — GOWN SLEEVES

## (undated) DEVICE — Device

## (undated) DEVICE — KNIFE ALCON PARACENTESIS CLEARCUT SIDEPORT 1MM (YELLOW)

## (undated) DEVICE — TIP OZIL 12 DEGREE MINI FLARE

## (undated) DEVICE — SOL IRR BAL SALT 500ML

## (undated) DEVICE — DRAPE MICROSCOPE KNOB COVER SMALL (2 PCS)